# Patient Record
Sex: FEMALE | Race: WHITE | NOT HISPANIC OR LATINO | Employment: FULL TIME | URBAN - NONMETROPOLITAN AREA
[De-identification: names, ages, dates, MRNs, and addresses within clinical notes are randomized per-mention and may not be internally consistent; named-entity substitution may affect disease eponyms.]

---

## 2017-10-16 ENCOUNTER — OFFICE VISIT (OUTPATIENT)
Dept: SURGERY | Facility: CLINIC | Age: 37
End: 2017-10-16

## 2017-10-16 VITALS
SYSTOLIC BLOOD PRESSURE: 122 MMHG | BODY MASS INDEX: 24.1 KG/M2 | RESPIRATION RATE: 16 BRPM | HEART RATE: 82 BPM | DIASTOLIC BLOOD PRESSURE: 80 MMHG | WEIGHT: 136 LBS | OXYGEN SATURATION: 100 % | HEIGHT: 63 IN | TEMPERATURE: 98.2 F

## 2017-10-16 DIAGNOSIS — R59.9 ENLARGED LYMPH NODE: Primary | ICD-10-CM

## 2017-10-16 DIAGNOSIS — K40.90 NON-RECURRENT UNILATERAL INGUINAL HERNIA WITHOUT OBSTRUCTION OR GANGRENE: ICD-10-CM

## 2017-10-16 PROCEDURE — 99204 OFFICE O/P NEW MOD 45 MIN: CPT | Performed by: SURGERY

## 2017-10-16 RX ORDER — ALBUTEROL SULFATE 90 UG/1
2 AEROSOL, METERED RESPIRATORY (INHALATION) EVERY 4 HOURS PRN
COMMUNITY

## 2017-10-16 RX ORDER — FLUTICASONE PROPIONATE 220 UG/1
1 AEROSOL, METERED RESPIRATORY (INHALATION)
COMMUNITY
End: 2017-11-13

## 2017-10-16 RX ORDER — CLONAZEPAM 1 MG/1
1 TABLET ORAL NIGHTLY
COMMUNITY

## 2017-10-16 RX ORDER — GABAPENTIN 300 MG/1
300 CAPSULE ORAL NIGHTLY
COMMUNITY
End: 2021-01-08

## 2017-10-16 RX ORDER — TRAMADOL HYDROCHLORIDE 50 MG/1
50 TABLET ORAL EVERY 6 HOURS PRN
COMMUNITY

## 2017-10-16 RX ORDER — TIZANIDINE 4 MG/1
8 TABLET ORAL NIGHTLY PRN
COMMUNITY
End: 2021-01-08

## 2017-10-16 RX ORDER — IBUPROFEN 600 MG/1
600 TABLET ORAL EVERY 6 HOURS PRN
COMMUNITY
End: 2021-01-08 | Stop reason: ALTCHOICE

## 2017-10-16 RX ORDER — THEOPHYLLINE 600 MG/1
600 TABLET, EXTENDED RELEASE ORAL DAILY
COMMUNITY

## 2017-10-16 NOTE — PROGRESS NOTES
"Patient: Marlin Shi    YOB: 1980    Date: 10/16/2017    Primary Care Provider: Kris Melton MD    Chief complaint:   Chief Complaint   Patient presents with   • Swollen Glands     right submandibular and right groin area.       Subjective .     History of present illness: Pt is here for evaluation of palpable \"lymph node\" in right groin area which has been present for @ 2 years, she stated \"they area always swollen but sometimes it gets as big as a golf ball,\" she also complains of palpable \"lymph node\" @ her right submandibular area which has been present for many years. Pt denies redness and/or warmth at either site.Pain increases with lifting and straining.  No change in bowel habits.    Review of Systems   Constitutional: Negative for chills, fever and unexpected weight change.   HENT: Negative for hearing loss, trouble swallowing and voice change.    Eyes: Negative for visual disturbance.   Respiratory: Negative for apnea, cough, chest tightness, shortness of breath and wheezing.    Cardiovascular: Negative for chest pain, palpitations and leg swelling.   Gastrointestinal: Negative for abdominal distention, abdominal pain, anal bleeding, blood in stool, constipation, diarrhea, nausea, rectal pain and vomiting.   Endocrine: Negative for cold intolerance and heat intolerance.   Genitourinary: Negative for difficulty urinating, dysuria and flank pain.   Musculoskeletal: Negative for back pain and gait problem.   Skin: Negative for color change, rash and wound.   Neurological: Negative for dizziness, syncope, speech difficulty, weakness, light-headedness, numbness and headaches.   Hematological: Negative for adenopathy. Does not bruise/bleed easily.   Psychiatric/Behavioral: Negative for confusion. The patient is not nervous/anxious.        Allergies:  Allergies   Allergen Reactions   • Sulfa Antibiotics        Medications:    Current Outpatient Prescriptions:   •  albuterol " "(PROVENTIL HFA;VENTOLIN HFA) 108 (90 Base) MCG/ACT inhaler, Inhale 2 puffs Every 4 (Four) Hours As Needed for Wheezing., Disp: , Rfl:   •  clonazePAM (KLONOPIN) 1 MG tablet, Take 1 mg by mouth 2 (Two) Times a Day As Needed for Seizures., Disp: , Rfl:   •  fluticasone (FLOVENT HFA) 220 MCG/ACT inhaler, Inhale 1 puff 2 (Two) Times a Day., Disp: , Rfl:   •  gabapentin (NEURONTIN) 300 MG capsule, Take 300 mg by mouth 3 (Three) Times a Day., Disp: , Rfl:   •  ibuprofen (ADVIL,MOTRIN) 600 MG tablet, Take 600 mg by mouth Every 6 (Six) Hours As Needed for Mild Pain ., Disp: , Rfl:   •  theophylline (UNIPHYL) 600 MG 24 hr tablet, Take 600 mg by mouth Daily., Disp: , Rfl:   •  tiZANidine (ZANAFLEX) 4 MG tablet, Take 4 mg by mouth At Night As Needed for Muscle Spasms., Disp: , Rfl:   •  traMADol (ULTRAM) 50 MG tablet, Take 50 mg by mouth Every 6 (Six) Hours As Needed for Moderate Pain ., Disp: , Rfl:     History\"  Past Medical History:   Diagnosis Date   • Fibromyalgia    • MRSA (methicillin resistant Staphylococcus aureus)    • Neck pain        Past Surgical History:   Procedure Laterality Date   • BREAST AUGMENTATION     • DILATION AND CURETTAGE, DIAGNOSTIC / THERAPEUTIC     • SINUS SURGERY         Family History   Problem Relation Age of Onset   • Hypertension Father    • Cancer Maternal Aunt    • Diabetes Paternal Uncle        Social History   Substance Use Topics   • Smoking status: Never Smoker   • Smokeless tobacco: None   • Alcohol use Yes        Objective     Vital Signs:   /80  Pulse 82  Temp 98.2 °F (36.8 °C) (Temporal Artery )   Resp 16  Ht 63\" (160 cm)  Wt 136 lb (61.7 kg)  SpO2 100%  BMI 24.09 kg/m2    Physical Exam:   General Appearance:    Alert, cooperative, in no acute distress   Head:    Normocephalic, without obvious abnormality, atraumatic   Eyes:            Lids and lashes normal, conjunctivae and sclerae normal, no   icterus, no pallor, corneas clear, PERRLA   Ears:    Ears appear intact " with no abnormalities noted   Throat:   No oral lesions, no thrush, oral mucosa moist   Neck:   No adenopathy, supple, trachea midline, no thyromegaly, no   carotid bruit, no JVD   Lungs:     Clear to auscultation,respirations regular, even and                  unlabored    Heart:    Regular rhythm and normal rate, normal S1 and S2, no            murmur, no gallop, no rub, no click   Chest Wall:    No abnormalities observed   Abdomen:     Normal bowel sounds, no masses, no organomegaly, soft        non-tender, non-distended, no guarding, no rebound                tenderness   Extremities:   Moves all extremities well, no edema, no cyanosis, no             redness   Pulses:   Pulses palpable and equal bilaterally   Skin:   No bleeding, bruising or rash   Lymph nodes:   No palpable adenopathy   Neurologic:   Cranial nerves 2 - 12 grossly intact, sensation intact, DTR       present and equal bilaterally     Results Review:   I reviewed the patient's new clinical results.    Assessment/Plan     1. Enlarged lymph node    2. Non-recurrent unilateral inguinal hernia without obstruction or gangrene        Patient reassured about a large lymph node, appears reactive.  We will schedule for repair of right femoral hernia.  Risk of bleeding, use of mesh and recurrence discussed and patient agreeable.    I discussed the patients findings and my recommendations with patient    Review of Systems was reviewed and confirmed as accurate today.    Electronically signed by Jennifer Torrez MD  10/16/17    Scribed for Jennifer Torrez MD by Melissa Lucero. 10/16/2017  4:31 PM

## 2017-10-18 ENCOUNTER — PREP FOR SURGERY (OUTPATIENT)
Dept: OTHER | Facility: HOSPITAL | Age: 37
End: 2017-10-18

## 2017-10-18 DIAGNOSIS — K40.90 NON-RECURRENT UNILATERAL INGUINAL HERNIA WITHOUT OBSTRUCTION OR GANGRENE: Primary | ICD-10-CM

## 2017-11-13 ENCOUNTER — HOSPITAL ENCOUNTER (OUTPATIENT)
Dept: GENERAL RADIOLOGY | Facility: HOSPITAL | Age: 37
Discharge: HOME | End: 2017-11-13
Admitting: SURGERY

## 2017-11-13 ENCOUNTER — APPOINTMENT (OUTPATIENT)
Dept: PREADMISSION TESTING | Facility: HOSPITAL | Age: 37
End: 2017-11-13

## 2017-11-13 VITALS
OXYGEN SATURATION: 99 % | HEART RATE: 79 BPM | HEIGHT: 63 IN | SYSTOLIC BLOOD PRESSURE: 121 MMHG | BODY MASS INDEX: 23.92 KG/M2 | DIASTOLIC BLOOD PRESSURE: 60 MMHG | WEIGHT: 135 LBS

## 2017-11-13 DIAGNOSIS — K40.90 NON-RECURRENT UNILATERAL INGUINAL HERNIA WITHOUT OBSTRUCTION OR GANGRENE: ICD-10-CM

## 2017-11-13 LAB
ANION GAP SERPL CALCULATED.3IONS-SCNC: 14.5 MMOL/L
BACTERIA UR QL AUTO: ABNORMAL /HPF
BILIRUB UR QL STRIP: NEGATIVE
BUN BLD-MCNC: 13 MG/DL (ref 7–20)
BUN/CREAT SERPL: 16.3 (ref 7.1–23.5)
CALCIUM SPEC-SCNC: 9.3 MG/DL (ref 8.4–10.2)
CHLORIDE SERPL-SCNC: 104 MMOL/L (ref 98–107)
CLARITY UR: CLEAR
CO2 SERPL-SCNC: 27 MMOL/L (ref 26–30)
COLOR UR: YELLOW
CREAT BLD-MCNC: 0.8 MG/DL (ref 0.6–1.3)
DEPRECATED RDW RBC AUTO: 37.8 FL (ref 37–54)
ERYTHROCYTE [DISTWIDTH] IN BLOOD BY AUTOMATED COUNT: 12.6 % (ref 11.5–14.5)
GFR SERPL CREATININE-BSD FRML MDRD: 81 ML/MIN/1.73
GLUCOSE BLD-MCNC: 104 MG/DL (ref 74–98)
GLUCOSE UR STRIP-MCNC: NEGATIVE MG/DL
HCT VFR BLD AUTO: 38.4 % (ref 37–47)
HGB BLD-MCNC: 13.5 G/DL (ref 12–16)
HGB UR QL STRIP.AUTO: NEGATIVE
HYALINE CASTS UR QL AUTO: ABNORMAL /LPF
KETONES UR QL STRIP: NEGATIVE
LEUKOCYTE ESTERASE UR QL STRIP.AUTO: ABNORMAL
MCH RBC QN AUTO: 29.4 PG (ref 27–31)
MCHC RBC AUTO-ENTMCNC: 35.2 G/DL (ref 30–37)
MCV RBC AUTO: 83.7 FL (ref 81–99)
NITRITE UR QL STRIP: NEGATIVE
PH UR STRIP.AUTO: 7 [PH] (ref 5–8)
PLATELET # BLD AUTO: 284 10*3/MM3 (ref 130–400)
PMV BLD AUTO: 9.5 FL (ref 6–12)
POTASSIUM BLD-SCNC: 3.5 MMOL/L (ref 3.5–5.1)
PROT UR QL STRIP: NEGATIVE
RBC # BLD AUTO: 4.59 10*6/MM3 (ref 4.2–5.4)
RBC # UR: ABNORMAL /HPF
REF LAB TEST METHOD: ABNORMAL
SODIUM BLD-SCNC: 142 MMOL/L (ref 137–145)
SP GR UR STRIP: 1.01 (ref 1–1.03)
SQUAMOUS #/AREA URNS HPF: ABNORMAL /HPF
UROBILINOGEN UR QL STRIP: ABNORMAL
WBC NRBC COR # BLD: 8.83 10*3/MM3 (ref 4.8–10.8)
WBC UR QL AUTO: ABNORMAL /HPF

## 2017-11-13 PROCEDURE — 85027 COMPLETE CBC AUTOMATED: CPT | Performed by: SURGERY

## 2017-11-13 PROCEDURE — 87081 CULTURE SCREEN ONLY: CPT | Performed by: SURGERY

## 2017-11-13 PROCEDURE — 36415 COLL VENOUS BLD VENIPUNCTURE: CPT

## 2017-11-13 PROCEDURE — 81001 URINALYSIS AUTO W/SCOPE: CPT | Performed by: SURGERY

## 2017-11-13 PROCEDURE — 93005 ELECTROCARDIOGRAM TRACING: CPT | Performed by: SURGERY

## 2017-11-13 PROCEDURE — 71010 HC CHEST PA OR AP: CPT

## 2017-11-13 PROCEDURE — 80048 BASIC METABOLIC PNL TOTAL CA: CPT | Performed by: SURGERY

## 2017-11-13 PROCEDURE — 87086 URINE CULTURE/COLONY COUNT: CPT | Performed by: SURGERY

## 2017-11-13 RX ORDER — FLUTICASONE PROPIONATE 220 UG/1
1 AEROSOL, METERED RESPIRATORY (INHALATION) NIGHTLY
COMMUNITY
End: 2021-07-16

## 2017-11-15 LAB — BACTERIA SPEC AEROBE CULT: NO GROWTH

## 2017-11-17 LAB — MRSA SPEC QL CULT: NORMAL

## 2017-11-20 ENCOUNTER — ANESTHESIA (OUTPATIENT)
Dept: PERIOP | Facility: HOSPITAL | Age: 37
End: 2017-11-20

## 2017-11-20 ENCOUNTER — HOSPITAL ENCOUNTER (OUTPATIENT)
Facility: HOSPITAL | Age: 37
Setting detail: HOSPITAL OUTPATIENT SURGERY
Discharge: HOME | End: 2017-11-20
Attending: SURGERY | Admitting: SURGERY

## 2017-11-20 ENCOUNTER — ANESTHESIA EVENT (OUTPATIENT)
Dept: PERIOP | Facility: HOSPITAL | Age: 37
End: 2017-11-20

## 2017-11-20 VITALS
TEMPERATURE: 100.5 F | DIASTOLIC BLOOD PRESSURE: 72 MMHG | RESPIRATION RATE: 18 BRPM | OXYGEN SATURATION: 98 % | SYSTOLIC BLOOD PRESSURE: 128 MMHG | HEART RATE: 86 BPM

## 2017-11-20 DIAGNOSIS — K40.90 NON-RECURRENT UNILATERAL INGUINAL HERNIA WITHOUT OBSTRUCTION OR GANGRENE: ICD-10-CM

## 2017-11-20 LAB — B-HCG UR QL: NEGATIVE

## 2017-11-20 PROCEDURE — 49553 RPR FEM HERNIA INIT BLOCKED: CPT | Performed by: SURGERY

## 2017-11-20 PROCEDURE — 25010000002 HYDROMORPHONE PER 4 MG

## 2017-11-20 PROCEDURE — 25010000002 HYDROMORPHONE PER 4 MG: Performed by: NURSE ANESTHETIST, CERTIFIED REGISTERED

## 2017-11-20 PROCEDURE — 25010000002 DEXAMETHASONE PER 1 MG: Performed by: NURSE ANESTHETIST, CERTIFIED REGISTERED

## 2017-11-20 PROCEDURE — 25010000002 PROMETHAZINE PER 50 MG

## 2017-11-20 PROCEDURE — 25010000002 PROPOFOL 200 MG/20ML EMULSION: Performed by: NURSE ANESTHETIST, CERTIFIED REGISTERED

## 2017-11-20 PROCEDURE — 81025 URINE PREGNANCY TEST: CPT | Performed by: SURGERY

## 2017-11-20 PROCEDURE — 51860 REPAIR OF BLADDER WOUND: CPT | Performed by: SURGERY

## 2017-11-20 PROCEDURE — C1781 MESH (IMPLANTABLE): HCPCS | Performed by: SURGERY

## 2017-11-20 PROCEDURE — 25010000002 ONDANSETRON PER 1 MG: Performed by: NURSE ANESTHETIST, CERTIFIED REGISTERED

## 2017-11-20 PROCEDURE — 25010000002 LORAZEPAM PER 2 MG

## 2017-11-20 PROCEDURE — S0260 H&P FOR SURGERY: HCPCS | Performed by: SURGERY

## 2017-11-20 PROCEDURE — 25010000003 CEFAZOLIN PER 500 MG: Performed by: SURGERY

## 2017-11-20 DEVICE — IMPLANTABLE DEVICE: Type: IMPLANTABLE DEVICE | Site: GROIN | Status: FUNCTIONAL

## 2017-11-20 RX ORDER — PROPOFOL 10 MG/ML
INJECTION, EMULSION INTRAVENOUS AS NEEDED
Status: DISCONTINUED | OUTPATIENT
Start: 2017-11-20 | End: 2017-11-20 | Stop reason: SURG

## 2017-11-20 RX ORDER — LORAZEPAM 2 MG/ML
0.5 INJECTION INTRAMUSCULAR 2 TIMES DAILY PRN
Status: DISCONTINUED | OUTPATIENT
Start: 2017-11-20 | End: 2017-11-20 | Stop reason: HOSPADM

## 2017-11-20 RX ORDER — PROMETHAZINE HYDROCHLORIDE 25 MG/ML
12.5 INJECTION, SOLUTION INTRAMUSCULAR; INTRAVENOUS ONCE AS NEEDED
Status: COMPLETED | OUTPATIENT
Start: 2017-11-20 | End: 2017-11-20

## 2017-11-20 RX ORDER — SODIUM CHLORIDE, SODIUM LACTATE, POTASSIUM CHLORIDE, CALCIUM CHLORIDE 600; 310; 30; 20 MG/100ML; MG/100ML; MG/100ML; MG/100ML
1000 INJECTION, SOLUTION INTRAVENOUS CONTINUOUS PRN
Status: DISCONTINUED | OUTPATIENT
Start: 2017-11-20 | End: 2017-11-20 | Stop reason: HOSPADM

## 2017-11-20 RX ORDER — ONDANSETRON 2 MG/ML
4 INJECTION INTRAMUSCULAR; INTRAVENOUS ONCE AS NEEDED
Status: DISCONTINUED | OUTPATIENT
Start: 2017-11-20 | End: 2017-11-20 | Stop reason: HOSPADM

## 2017-11-20 RX ORDER — PROMETHAZINE HYDROCHLORIDE 25 MG/ML
INJECTION, SOLUTION INTRAMUSCULAR; INTRAVENOUS
Status: COMPLETED
Start: 2017-11-20 | End: 2017-11-20

## 2017-11-20 RX ORDER — HYDROCODONE BITARTRATE AND ACETAMINOPHEN 5; 325 MG/1; MG/1
1-2 TABLET ORAL EVERY 4 HOURS PRN
Qty: 28 TABLET | Refills: 0 | Status: SHIPPED | OUTPATIENT
Start: 2017-11-20 | End: 2021-01-08

## 2017-11-20 RX ORDER — MAGNESIUM HYDROXIDE 1200 MG/15ML
LIQUID ORAL AS NEEDED
Status: DISCONTINUED | OUTPATIENT
Start: 2017-11-20 | End: 2017-11-20 | Stop reason: HOSPADM

## 2017-11-20 RX ORDER — MEPERIDINE HYDROCHLORIDE 50 MG/ML
50 INJECTION INTRAMUSCULAR; INTRAVENOUS; SUBCUTANEOUS ONCE
Status: COMPLETED | OUTPATIENT
Start: 2017-11-20 | End: 2017-11-20

## 2017-11-20 RX ORDER — BUPIVACAINE HYDROCHLORIDE 5 MG/ML
INJECTION, SOLUTION EPIDURAL; INTRACAUDAL AS NEEDED
Status: DISCONTINUED | OUTPATIENT
Start: 2017-11-20 | End: 2017-11-20 | Stop reason: HOSPADM

## 2017-11-20 RX ORDER — SODIUM CHLORIDE 0.9 % (FLUSH) 0.9 %
3 SYRINGE (ML) INJECTION AS NEEDED
Status: DISCONTINUED | OUTPATIENT
Start: 2017-11-20 | End: 2017-11-20 | Stop reason: HOSPADM

## 2017-11-20 RX ORDER — LORAZEPAM 2 MG/ML
INJECTION INTRAMUSCULAR
Status: COMPLETED
Start: 2017-11-20 | End: 2017-11-20

## 2017-11-20 RX ORDER — DEXAMETHASONE SODIUM PHOSPHATE 4 MG/ML
INJECTION, SOLUTION INTRA-ARTICULAR; INTRALESIONAL; INTRAMUSCULAR; INTRAVENOUS; SOFT TISSUE AS NEEDED
Status: DISCONTINUED | OUTPATIENT
Start: 2017-11-20 | End: 2017-11-20 | Stop reason: SURG

## 2017-11-20 RX ORDER — ONDANSETRON 2 MG/ML
INJECTION INTRAMUSCULAR; INTRAVENOUS AS NEEDED
Status: DISCONTINUED | OUTPATIENT
Start: 2017-11-20 | End: 2017-11-20 | Stop reason: SURG

## 2017-11-20 RX ORDER — MEPERIDINE HYDROCHLORIDE 50 MG/ML
INJECTION INTRAMUSCULAR; INTRAVENOUS; SUBCUTANEOUS
Status: COMPLETED
Start: 2017-11-20 | End: 2017-11-20

## 2017-11-20 RX ORDER — SODIUM CHLORIDE 9 MG/ML
INJECTION, SOLUTION INTRAVENOUS
Status: DISCONTINUED
Start: 2017-11-20 | End: 2017-11-20 | Stop reason: HOSPADM

## 2017-11-20 RX ORDER — MORPHINE SULFATE 2 MG/ML
2 INJECTION, SOLUTION INTRAMUSCULAR; INTRAVENOUS
Status: DISCONTINUED | OUTPATIENT
Start: 2017-11-20 | End: 2017-11-20 | Stop reason: HOSPADM

## 2017-11-20 RX ADMIN — HYDROMORPHONE HYDROCHLORIDE 0.5 MG: 1 INJECTION, SOLUTION INTRAMUSCULAR; INTRAVENOUS; SUBCUTANEOUS at 11:01

## 2017-11-20 RX ADMIN — ONDANSETRON 4 MG: 2 INJECTION INTRAMUSCULAR; INTRAVENOUS at 09:06

## 2017-11-20 RX ADMIN — HYDROMORPHONE HYDROCHLORIDE 0.5 MG: 1 INJECTION, SOLUTION INTRAMUSCULAR; INTRAVENOUS; SUBCUTANEOUS at 10:49

## 2017-11-20 RX ADMIN — CEFAZOLIN SODIUM 2 G: 2 SOLUTION INTRAVENOUS at 09:04

## 2017-11-20 RX ADMIN — LORAZEPAM 0.5 MG: 2 INJECTION INTRAMUSCULAR at 10:20

## 2017-11-20 RX ADMIN — MEPERIDINE HYDROCHLORIDE 50 MG: 50 INJECTION, SOLUTION INTRAMUSCULAR; INTRAVENOUS; SUBCUTANEOUS at 10:34

## 2017-11-20 RX ADMIN — SODIUM CHLORIDE, POTASSIUM CHLORIDE, SODIUM LACTATE AND CALCIUM CHLORIDE: 600; 310; 30; 20 INJECTION, SOLUTION INTRAVENOUS at 09:35

## 2017-11-20 RX ADMIN — HYDROMORPHONE HYDROCHLORIDE 0.5 MG: 1 INJECTION, SOLUTION INTRAMUSCULAR; INTRAVENOUS; SUBCUTANEOUS at 10:25

## 2017-11-20 RX ADMIN — HYDROMORPHONE HYDROCHLORIDE 0.5 MG: 1 INJECTION, SOLUTION INTRAMUSCULAR; INTRAVENOUS; SUBCUTANEOUS at 10:13

## 2017-11-20 RX ADMIN — DEXAMETHASONE SODIUM PHOSPHATE 8 MG: 4 INJECTION, SOLUTION INTRAMUSCULAR; INTRAVENOUS at 09:06

## 2017-11-20 RX ADMIN — PROPOFOL 200 MG: 10 INJECTION, EMULSION INTRAVENOUS at 09:06

## 2017-11-20 RX ADMIN — PROMETHAZINE HYDROCHLORIDE 12.5 MG: 25 INJECTION, SOLUTION INTRAMUSCULAR; INTRAVENOUS at 11:16

## 2017-11-20 RX ADMIN — SODIUM CHLORIDE, POTASSIUM CHLORIDE, SODIUM LACTATE AND CALCIUM CHLORIDE 1000 ML: 600; 310; 30; 20 INJECTION, SOLUTION INTRAVENOUS at 08:01

## 2017-11-20 RX ADMIN — LORAZEPAM 0.5 MG: 2 INJECTION, SOLUTION INTRAMUSCULAR; INTRAVENOUS at 10:20

## 2017-11-20 RX ADMIN — MEPERIDINE HYDROCHLORIDE 50 MG: 50 INJECTION INTRAMUSCULAR; INTRAVENOUS; SUBCUTANEOUS at 10:34

## 2017-11-20 NOTE — OP NOTE
PATIENT:    Marlin Pena    DATE OF SURGERY:  11/20/2017    PHYSICIAN:    Jennifer Torrez MD    REFERRING PHYSICIAN:  Jennifer Torrez MD    YOB: 1980    PREOPERATIVE DIAGNOSIS:  Right femoral hernia    POSTOPERATIVE DIAGNOSIS:  Incarcerated right femoral hernia with involvement of neck of the bladder.  A 1 cm bladder injury.    PROCEDURE:  #1 incarcerated right femoral hernia repair with mesh.  #2 primary repair of bladder injury with suture.    INDICATIONS:  The patient was sent to me as a consultation by Jennifer Torrez MD for evaluation and treatment of a history significant for right femoral hernia. They are here now today for repair with mesh.  Risk of bleeding, infection use of mesh with recurrence discussed and patient agreeable    ANESTHESIA:  General Anesthesia     OPERATIVE PROCEDURE:  The patient was taken to the operating room, placed in the supine position, and given general endotracheal anesthesia.  They were prepped and draped in the normal sterile fashion.  They did receive preoperative IV antibiotics.  The nursing staff did perform intraoperative timeout prior to the incision.    Incision right groin made down to the fascia.  The external wife was divided, preparedwith entered.  Patient had the bladder involvement of femoral hernia.  It appeared that a portion of bladder had gone down into the femoral canal that was incarcerated.  There was a very difficult separation and reduction of the hernia.  In doing so a 1 cm tear in the bladder was identified with leakage of urine.  After reduction of the hernia, I divided the round ligament.  I then repaired the bladder primarily using 2 layers of 3-0 Vicryl and 2-0 Vicryl suture.  I then buttressed the repair with muscle tissue.  A Powell catheter was inserted and large amounts of fluid was placed in the bladder with no further leakage.  I then placed a 1 x 4 cm piece of mesh case closed the femoral defect with Prolene suture and  finish repair the hernia with a running Prolene suture as well the external bike was closed with 2-0 Vicryl suture the abdominal wall closed in 2 layers.  The Powell catheter was left in place.  I will remove in the office in 2 days.    The patient was stable at this point in time and subsequently transferred back to the recovery room in stable condition.       Jennifer Torrez MD  11/20/2017  10:14 AM

## 2017-11-20 NOTE — DISCHARGE INSTRUCTIONS
Please follow all post op instructions and follow up appointment time from your physician's office included in your discharge packet.  .   To assist you in voiding:  Drink plenty of fluids  Listen to running water while attempting to void.    If you are unable to urinate and you have an uncomfortable urge to void or it has been   6 hours since you were discharged, return to the Emergency Room  No pushing, pulling, tugging,  heavy lifting, or strenuous activity.  No major decision making, driving, or drinking alcoholic beverages for 24 hours. ( due to the medications you have  received)  Always use good hand hygiene/washing techniques.  NO driving while taking pain medications.  Empty leg bag often and record amount

## 2017-11-20 NOTE — ANESTHESIA PROCEDURE NOTES
Airway  Urgency: elective    Airway not difficult    General Information and Staff    Patient location during procedure: OR  CRNA: FABIAN FRANKLIN    Indications and Patient Condition  Indications for airway management: airway protection    Preoxygenated: yes  Mask difficulty assessment: 0 - not attempted    Final Airway Details  Final airway type: supraglottic airway      Successful airway: classic  Size 3    Number of attempts at approach: 1    Additional Comments  Airway pressure leak at <20cm/h20

## 2017-11-20 NOTE — PLAN OF CARE
Problem: Perioperative Period (Adult)  Intervention: Promote Pulmonary Hygiene and Secretion Clearance    11/20/17 1010   Promote Aggressive Pulmonary Hygiene/Secretion Management   Cough And Deep Breathing done with encouragement   Positioning   Head Of Bed (HOB) Position HOB elevated   Activity   Activity Type activity adjusted per tolerance;dorsiflexion, plantar flexion encouraged       Intervention: Monitor/Manage Pain    11/20/17 1010   Safety Interventions   Medication Review/Management medications reviewed   Manage Acute Burn Pain   Bowel Intervention adequate fluid intake promoted   Pain Management Interventions diversional activity encouraged;cold applied;pain care plan reviewed with patient/caregiver;medicated       Intervention: Promote Normothermia    11/20/17 1010   Cardiac Interventions   Warming Thermoregulation Maintenance warm blankets applied         Goal: Signs and Symptoms of Listed Potential Problems Will be Absent or Manageable (Perioperative Period)  Outcome: Ongoing (interventions implemented as appropriate)    11/20/17 1010   Perioperative Period   Problems Assessed (Perioperative Period) all   Problems Present (Perioperative Period) pain

## 2017-11-20 NOTE — H&P
"    [facility]   HISTORY AND PHYSICAL      Name:  Marlin Pena   Age:  37 y.o.  Sex:  female  :  1980  MRN:  [id number]   Visit Number:  [id number]  Admission Date:  2017  Date Of Service:  17  Primary Care Physician:  Kris Melton MD    Chief Complaint:     Right femoral hernia    History Of Presenting Illness:      Patient presents with right femoral hernia, increased pain and discomfort.  Also enlarged lymph node that was benign.  He comes in for repair of hernia.    Review Of Systems:     General ROS: Patient denies any fevers, chills or loss of consciousness.  No complaints of generalized weakness  Psychological ROS: Denies any hallucinations and delusions.  Ophthalmic ROS: no transient loss of vision.  ENT ROS: Denies sore throat, nasal congestion or ear pain.   Allergy and Immunology ROS: Denies rash or itching.  Hematological and Lymphatic ROS: Denies neck swelling or easy bleeding.  Endocrine ROS: Denies any recent unintentional weight gain or loss.  Breast ROS: Denies any pain or swelling.  Respiratory ROS: Denies cough or shortness of breath.   Cardiovascular ROS: Denies chest pain or palpitations. No history of exertional chest pain.   Gastrointestinal ROS: Denies nausea and vomiting. Denies any abdominal pain. No diarrhea.   Genito-Urinary ROS: Denies dysuria or hematuria.  Musculoskeletal ROS: no back pain. No muscle pain. No calf pain.   Neurological ROS: Denies any focal weakness. No loss of consciousness. Denies any numbness.   Dermatological ROS: Denies any redness or pruritis.     Past Medical History:    Past Medical History:   Diagnosis Date   • Arthritis    • Asthma    • Depression    • Dysphagia     REPORTS \"IT'S LIKE THINGS WON'T GO DOWN SOMETIMES\"   • Fibromyalgia    • GERD (gastroesophageal reflux disease)     PATIENT REPORTS HAD ER VISIT IN 2016 FOR RIGHT SIDED PAIN.  PATIENT THOUGHT MAY BE HEART RELATED BUT REPORTS " WAS TOLD ONLY REFLUX.   • History of bronchitis    • History of kidney stones     REPORTS PASSED WITHOUT SURGICAL INTERVENTION IN SUMMER OF 2017   • History of panic attacks    • History of pneumonia    • Insomnia    • MRSA (methicillin resistant Staphylococcus aureus) 2008    REPORTS IN SINUS CAVITY AND THAT SHE WAS TREATED   • Neck pain    • TIA (transient ischemic attack)     REPORTS HX OF TIA 8 YEARS AGO AND THAT SHE WAS TAKEN OFF OF BCP.  NO RESIDUAL NOTED   • Wears glasses     FOR READING       Past Surgical history:    Past Surgical History:   Procedure Laterality Date   • BREAST AUGMENTATION     • COLONOSCOPY     • DILATION AND CURETTAGE, DIAGNOSTIC / THERAPEUTIC     • ENDOSCOPY     • SINUS SURGERY     • WISDOM TOOTH EXTRACTION         Social History:    Social History     Social History   • Marital status:      Spouse name: N/A   • Number of children: N/A   • Years of education: N/A     Occupational History   • Not on file.     Social History Main Topics   • Smoking status: Never Smoker   • Smokeless tobacco: Never Used   • Alcohol use Yes      Comment: SOCIAL USE ONLY, NO HX OF ABUSE REPORTED   • Drug use: No   • Sexual activity: Defer     Other Topics Concern   • Not on file     Social History Narrative       Family History:    Family History   Problem Relation Age of Onset   • Hypertension Father    • Cancer Maternal Aunt    • Diabetes Paternal Uncle        Allergies:      Sulfa antibiotics    Home Medications:    Prior to Admission Medications     Prescriptions Last Dose Informant Patient Reported? Taking?    albuterol (PROVENTIL HFA;VENTOLIN HFA) 108 (90 Base) MCG/ACT inhaler Past Week Self Yes Yes    Inhale 2 puffs Every 4 (Four) Hours As Needed for Wheezing.    albuterol (PROVENTIL) (5 MG/ML) 0.5% nebulizer solution Past Week Self Yes Yes    Take 2.5 mg by nebulization Every 6 (Six) Hours As Needed for Wheezing.    clonazePAM (KLONOPIN) 1 MG tablet 11/19/2017 Self Yes Yes    Take 1 mg by  mouth At Night As Needed for Anxiety or Seizures. REPORTS SHE TAKES 2 OF THE 0.5MG TABLETS (FOR TOTAL DOSE OF 1MG) AT NIGHT    fluticasone (FLOVENT HFA) 220 MCG/ACT inhaler 11/19/2017 Self Yes Yes    Inhale 1 puff Every Night.    gabapentin (NEURONTIN) 300 MG capsule 11/19/2017 Self Yes Yes    Take 300 mg by mouth Every Night.    ibuprofen (ADVIL,MOTRIN) 600 MG tablet Past Month Self Yes Yes    Take 600 mg by mouth Every 6 (Six) Hours As Needed for Mild Pain  or Headache.    Loratadine-Pseudoephedrine (CLARITIN-D 24 HOUR PO) 11/19/2017 Self Yes Yes    Take 1 tablet by mouth Daily.    melatonin 5 MG sublingual tablet sublingual tablet Past Week Self Yes Yes    Place 10 mg under the tongue Every Night.    Probiotic Product (PROBIOTIC ADVANCED PO) 11/19/2017 Self Yes Yes    Take 1 tablet by mouth Daily.    theophylline (UNIPHYL) 600 MG 24 hr tablet 11/19/2017 Self Yes Yes    Take 600 mg by mouth Daily.    tiZANidine (ZANAFLEX) 4 MG tablet 11/19/2017 Self Yes Yes    Take 8 mg by mouth At Night As Needed for Muscle Spasms.    traMADol (ULTRAM) 50 MG tablet 11/19/2017 Self Yes Yes    Take 50 mg by mouth Every 6 (Six) Hours As Needed for Moderate Pain .             ED Medications:    Medications   sodium chloride 0.9 % flush 3 mL (not administered)   lactated ringers infusion 1,000 mL (1,000 mL Intravenous New Bag 11/20/17 0801)   ceFAZolin (ANCEF) IVPB (duplex) 2 g (not administered)   ceFAZolin (ANCEF) 2-3 GM-% IVPB (duplex)  - ADS Override Pull (not administered)       Vital Signs:    Temp:  [98.4 °F (36.9 °C)] 98.4 °F (36.9 °C)  Heart Rate:  [89] 89  Resp:  [16] 16  BP: (140)/(77) 140/77    There were no vitals filed for this visit.    There is no height or weight on file to calculate BMI.    Physical Exam:      General Appearance:  Alert and cooperative, not in any acute distress.   Head:  Atraumatic and normocephalic, without obvious abnormality.   Eyes:          PERRLA, conjunctivae and sclerae normal, no Icterus.  No pallor. Extra-occular movements are within normal limits.   Ears:  Ears appear intact with no abnormalities noted.   Throat: No oral lesions, no thrush, oral mucosa moist.   Neck: Supple, trachea midline, no thyromegaly, no carotid bruit.       Respiratory/Lungs:   Breath sounds heard bilaterally equally.  No crackles or wheezing. No Pleural rub or bronchial breathing. Normal respiratory effort.    Cardiovascular/Heart:  Normal S1 and S2, no murmur. No edema   GI/Abdomen:   No masses, no hepatosplenomegaly. Soft, non-tender, non-distended, Right inguinal hernia, reducible              Musculoskeletal/ Extremities:   Moves all extremities well   Pulses: Pulses palpable and equal bilaterally   Skin: No bleeding, bruising or rash, no induration   Lymph nodes: No palpable adenopathy   Psychiatric : Alert and oriented ×3.  No depression or anxiety            EKG:      Negative    Labs:    Lab Results (last 24 hours)     Procedure Component Value Units Date/Time    Pregnancy, Urine - Urine, Clean Catch [[id number]]  (Normal) Collected:  11/20/17 0748    Specimen:  Urine from Urine, Clean Catch Updated:  11/20/17 0814     HCG, Urine QL Negative          Radiology:    Imaging Results (last 72 hours)     ** No results found for the last 72 hours. **          Assessment:    Right femoral hernia, symptomatic.     Plan:     Plans for repair today.  Risks of bleeding, infection and recurrence and use of mesh discussed and patient agreeable    Jennifer Torrez MD  11/20/17  8:43 AM

## 2017-11-20 NOTE — PLAN OF CARE
Problem: Patient Care Overview (Adult)  Goal: Plan of Care Review  Outcome: Ongoing (interventions implemented as appropriate)    11/20/17 1200   Coping/Psychosocial Response Interventions   Plan Of Care Reviewed With patient   Patient Care Overview   Progress progress toward functional goals as expected   Outcome Evaluation   Outcome Summary/Follow up Plan VSS, PACU CARE COMPLETE, TO SDS IN STABLE CONDITION

## 2017-11-20 NOTE — PLAN OF CARE
Problem: Perioperative Period (Adult)  Goal: Signs and Symptoms of Listed Potential Problems Will be Absent or Manageable (Perioperative Period)  Outcome: Ongoing (interventions implemented as appropriate)    11/20/17 0742   Perioperative Period   Problems Assessed (Perioperative Period) all   Problems Present (Perioperative Period) none

## 2017-11-20 NOTE — ANESTHESIA POSTPROCEDURE EVALUATION
Patient: Marlin Pena    Procedure Summary     Date Anesthesia Start Anesthesia Stop Room / Location    11/20/17 0902 1012  NEW OR 4 /  NEW OR       Procedure Diagnosis Surgeon Provider    REPAIR OF INCARCERATED RIGHT FEMORAL HERNIA, PRIMARY REPAIR OF BLADDER (Right Abdomen) Non-recurrent unilateral inguinal hernia without obstruction or gangrene  (Non-recurrent unilateral inguinal hernia without obstruction or gangrene [K40.90]) MD Panfilo Gibbons CRNA          Anesthesia Type: general  Last vitals  BP   129/69 (11/20/17 1200)   Temp   99 °F (37.2 °C) (11/20/17 1200)   Pulse   98 (11/20/17 1200)   Resp   14 (11/20/17 1200)     SpO2   98 % (11/20/17 1200)     Post Anesthesia Care and Evaluation    Patient location during evaluation: PACU  Patient participation: complete - patient participated  Level of consciousness: awake  Pain score: 3  Pain management: adequate  Airway patency: patent  Anesthetic complications: No anesthetic complications  PONV Status: controlled  Cardiovascular status: acceptable and stable  Respiratory status: acceptable and face mask  Hydration status: acceptable

## 2017-11-20 NOTE — ANESTHESIA PREPROCEDURE EVALUATION
Anesthesia Evaluation     Patient summary reviewed and Nursing notes reviewed   no history of anesthetic complications:  NPO Solid Status: > 8 hours  NPO Liquid Status: > 8 hours     Airway   Mallampati: I  TM distance: >3 FB  Neck ROM: full  no difficulty expected  Dental - normal exam     Pulmonary - normal exam   (+) asthma,   Cardiovascular - negative cardio ROS and normal exam        Neuro/Psych  (+) TIA,    GI/Hepatic/Renal/Endo    (+)  GERD,     Musculoskeletal (-) negative ROS    Abdominal    Substance History - negative use     OB/GYN negative ob/gyn ROS         Other - negative ROS                                       Anesthesia Plan    ASA 3     general     intravenous induction   Anesthetic plan and risks discussed with patient.

## 2017-11-21 ENCOUNTER — TELEPHONE (OUTPATIENT)
Dept: SURGERY | Facility: CLINIC | Age: 37
End: 2017-11-21

## 2017-11-21 NOTE — TELEPHONE ENCOUNTER
Patient had called wondering if after leaving the hospital yesterday after her hernia repair did she need an antibiotic.  I told patient not normally, she was going to be seen in the morning so it could be addressed then.

## 2017-11-22 ENCOUNTER — OFFICE VISIT (OUTPATIENT)
Dept: SURGERY | Facility: CLINIC | Age: 37
End: 2017-11-22

## 2017-11-22 VITALS
HEIGHT: 63 IN | SYSTOLIC BLOOD PRESSURE: 130 MMHG | TEMPERATURE: 98.3 F | BODY MASS INDEX: 23.92 KG/M2 | OXYGEN SATURATION: 98 % | HEART RATE: 129 BPM | WEIGHT: 135 LBS | DIASTOLIC BLOOD PRESSURE: 80 MMHG

## 2017-11-22 DIAGNOSIS — Z48.89 POSTOPERATIVE VISIT: Primary | ICD-10-CM

## 2017-11-22 PROCEDURE — 99024 POSTOP FOLLOW-UP VISIT: CPT | Performed by: SURGERY

## 2017-11-22 NOTE — PROGRESS NOTES
Patient: Marlin Pena    YOB: 1980    Date: 11/22/2017    Primary Care Provider: Kris Melton MD    Reason for Consultation: Follow-up hernia    Chief Complaint:   Chief Complaint   Patient presents with   • Post-op Hernia     needs catheter removed       History of present illness:  I saw the patient in the office today as a followup from their recent hernia repair, she also needs her catheter taken out.  They state that they are having intense pain and soreness from her surgery.  Patient had a bladder diverticulum involving or femoral hernia, bladder is healed nicely.  No hematuria.  Powell catheter removed today.      Vital Signs:   Temp:  [98.3 °F (36.8 °C)] 98.3 °F (36.8 °C)  Heart Rate:  [129] 129  BP: (130)/(80) 130/80    Physical Exam:   General Appearance:    Alert, cooperative, in no acute distress   Abdomen:     no masses, no organomegaly, soft non-tender, non-distended, no guarding, wounds are well healed, no evidence of recurrent hernia     Assessment / Plan:    1. Postoperative visit        I did discuss the situation with the patient today in the office and they have done well from their recent herniorraphy.I have refilled her pain medication and will follow-up in 2 weeks.  Patient encouraged to ambulate, limit pain medication and add Motrin to her regimen.  I have released the patient back to normal activity, they understand that they need to be careful about heavy lifting.  I need to see the patient back in 2 weeks.    Electronically signed by Jennifer Torrez MD  11/22/17        Scribed for Jennifer Torrez MD by Akiko Locke. 11/22/2017  9:58 AM

## 2017-12-06 ENCOUNTER — OFFICE VISIT (OUTPATIENT)
Dept: SURGERY | Facility: CLINIC | Age: 37
End: 2017-12-06

## 2017-12-06 VITALS
TEMPERATURE: 98.4 F | DIASTOLIC BLOOD PRESSURE: 78 MMHG | OXYGEN SATURATION: 98 % | WEIGHT: 134.92 LBS | HEART RATE: 95 BPM | SYSTOLIC BLOOD PRESSURE: 128 MMHG | BODY MASS INDEX: 23.91 KG/M2 | HEIGHT: 63 IN

## 2017-12-06 DIAGNOSIS — Z48.89 POSTOPERATIVE VISIT: Primary | ICD-10-CM

## 2017-12-06 PROCEDURE — 99024 POSTOP FOLLOW-UP VISIT: CPT | Performed by: SURGERY

## 2017-12-06 NOTE — PROGRESS NOTES
Patient: Marlin Pena    YOB: 1980    Date: 12/06/2017    Primary Care Provider: Kris Melton MD    Reason for Consultation: Follow-up hernia    Chief Complaint:   Chief Complaint   Patient presents with   • Post-op     Post op femoral hernia repair       History of present illness:  I saw the patient in the office today as a followup from their recent femoral hernia repair.  She also had to have a Powell catheter placed afterwards.  That was removed 2 weeks ago.  They state that they have done well but still complain of RLQ pain that radiates into her back.  She says it is sometimes a 7 on the pain scale.  She denies any urination problems.    The following portions of the patient's history were reviewed and updated as appropriate: allergies, current medications, past family history, past medical history, past social history, past surgical history and problem list.    Vital Signs:   Temp:  [98.4 °F (36.9 °C)] 98.4 °F (36.9 °C)  Heart Rate:  [95] 95  BP: (128)/(78) 128/78    Physical Exam:   General Appearance:    Alert, cooperative, in no acute distress   Abdomen:     no masses, no organomegaly, soft non-tender, non-distended, no guarding, wounds are well healed, no evidence of recurrent hernia     Results Review:   I reviewed the patient's new clinical results.    Assessment / Plan:    1. Postoperative visit        I did discuss the situation with the patient today in the office and they have done well from their recent herniorraphy.  I have released the patient back to normal activity, they understand that they need to be careful about heavy lifting.  I need to see the patient back in the office only if they are having further problems, they know to call me if they are.Reassured patient, continue limited lifting for 4 more weeks.    Electronically signed by Jennifer Torrez MD  12/06/17    Scribed for Jennifer Torrez MD by Michelle Milligan. 12/6/2017  10:11 AM

## 2017-12-13 ENCOUNTER — LAB (OUTPATIENT)
Dept: LAB | Facility: HOSPITAL | Age: 37
End: 2017-12-13
Attending: SURGERY

## 2017-12-13 DIAGNOSIS — R30.0 DIFFICULT OR PAINFUL URINATION: Primary | ICD-10-CM

## 2017-12-13 DIAGNOSIS — R30.0 DIFFICULT OR PAINFUL URINATION: ICD-10-CM

## 2017-12-13 LAB
BACTERIA UR QL AUTO: ABNORMAL /HPF
BILIRUB UR QL STRIP: NEGATIVE
CLARITY UR: CLEAR
COLOR UR: YELLOW
GLUCOSE UR STRIP-MCNC: NEGATIVE MG/DL
HGB UR QL STRIP.AUTO: NEGATIVE
HYALINE CASTS UR QL AUTO: ABNORMAL /LPF
KETONES UR QL STRIP: ABNORMAL
LEUKOCYTE ESTERASE UR QL STRIP.AUTO: NEGATIVE
MUCOUS THREADS URNS QL MICRO: ABNORMAL /HPF
NITRITE UR QL STRIP: NEGATIVE
PH UR STRIP.AUTO: 6.5 [PH] (ref 5–8)
PROT UR QL STRIP: NEGATIVE
RBC # UR: ABNORMAL /HPF
REF LAB TEST METHOD: ABNORMAL
SP GR UR STRIP: 1.02 (ref 1–1.03)
SQUAMOUS #/AREA URNS HPF: ABNORMAL /HPF
UROBILINOGEN UR QL STRIP: ABNORMAL
WBC UR QL AUTO: ABNORMAL /HPF

## 2017-12-13 PROCEDURE — 81001 URINALYSIS AUTO W/SCOPE: CPT

## 2018-05-17 ENCOUNTER — OFFICE VISIT (OUTPATIENT)
Dept: SURGERY | Facility: CLINIC | Age: 38
End: 2018-05-17

## 2018-05-17 VITALS
OXYGEN SATURATION: 100 % | SYSTOLIC BLOOD PRESSURE: 130 MMHG | WEIGHT: 134.92 LBS | HEART RATE: 86 BPM | DIASTOLIC BLOOD PRESSURE: 67 MMHG | HEIGHT: 63 IN | BODY MASS INDEX: 23.91 KG/M2 | TEMPERATURE: 98 F

## 2018-05-17 DIAGNOSIS — K52.9 CHRONIC DIARRHEA: ICD-10-CM

## 2018-05-17 DIAGNOSIS — R10.31 RIGHT LOWER QUADRANT ABDOMINAL PAIN: Primary | ICD-10-CM

## 2018-05-17 DIAGNOSIS — R10.31 PAIN, ABDOMINAL, RLQ: Primary | ICD-10-CM

## 2018-05-17 PROCEDURE — 99213 OFFICE O/P EST LOW 20 MIN: CPT | Performed by: SURGERY

## 2018-05-17 NOTE — PROGRESS NOTES
Patient: Marlin Pena    YOB: 1980    Date: 05/17/2018    Primary Care Provider: Kris Melton MD    Chief Complaint   Patient presents with   • Post-op Hernia       History: The patient is in the office today for a follow up after her femoral hernia repair 6 months ago.  She has had intermittent stabbing pain in her RLQ that radiates into her lower back since the surgery.  She has constipation alternating with loose stool.  She denies a lump or knot in that region.  Patient has history of kidney stones, recent UA was negative. Patient was to make sure there is no recurrence of hernia.    The following portions of the patient's history were reviewed and updated as appropriate: allergies, current medications, past family history, past medical history, past social history, past surgical history and problem list.      Review of Systems   Constitutional: Negative for chills, fever and unexpected weight change.   HENT: Negative for hearing loss, trouble swallowing and voice change.    Eyes: Negative for visual disturbance.   Respiratory: Negative for apnea, cough, chest tightness, shortness of breath and wheezing.    Cardiovascular: Negative for chest pain, palpitations and leg swelling.   Gastrointestinal: Positive for abdominal pain, constipation and diarrhea. Negative for abdominal distention, anal bleeding, blood in stool, nausea, rectal pain and vomiting.   Endocrine: Negative for cold intolerance and heat intolerance.   Genitourinary: Negative for difficulty urinating, dysuria and flank pain.   Musculoskeletal: Negative for back pain and gait problem.   Skin: Negative for color change, rash and wound.   Neurological: Negative for dizziness, syncope, speech difficulty, weakness, light-headedness, numbness and headaches.   Hematological: Negative for adenopathy. Does not bruise/bleed easily.   Psychiatric/Behavioral: Negative for confusion. The patient is not nervous/anxious.        Vital  "Signs  Vitals:    05/17/18 0950   BP: 130/67   Pulse: 86   Temp: 98 °F (36.7 °C)   TempSrc: Temporal Artery    SpO2: 100%   Weight: 61.2 kg (134 lb 14.7 oz)   Height: 160 cm (62.99\")       Allergies:  Allergies   Allergen Reactions   • Sulfa Antibiotics Other (See Comments)     REPORTS CAUSED HAND TREMORS AND HEART PALPITATIONS         Medications:    Current Outpatient Prescriptions:   •  albuterol (PROVENTIL HFA;VENTOLIN HFA) 108 (90 Base) MCG/ACT inhaler, Inhale 2 puffs Every 4 (Four) Hours As Needed for Wheezing., Disp: , Rfl:   •  albuterol (PROVENTIL) (5 MG/ML) 0.5% nebulizer solution, Take 2.5 mg by nebulization Every 6 (Six) Hours As Needed for Wheezing., Disp: , Rfl:   •  clonazePAM (KLONOPIN) 1 MG tablet, Take 1 mg by mouth At Night As Needed for Anxiety or Seizures. REPORTS SHE TAKES 2 OF THE 0.5MG TABLETS (FOR TOTAL DOSE OF 1MG) AT NIGHT, Disp: , Rfl:   •  fluticasone (FLOVENT HFA) 220 MCG/ACT inhaler, Inhale 1 puff Every Night., Disp: , Rfl:   •  gabapentin (NEURONTIN) 300 MG capsule, Take 300 mg by mouth Every Night., Disp: , Rfl:   •  HYDROcodone-acetaminophen (NORCO) 5-325 MG per tablet, Take 1-2 tablets by mouth Every 4 (Four) Hours As Needed (Pain)., Disp: 28 tablet, Rfl: 0  •  ibuprofen (ADVIL,MOTRIN) 600 MG tablet, Take 600 mg by mouth Every 6 (Six) Hours As Needed for Mild Pain  or Headache., Disp: , Rfl:   •  Loratadine-Pseudoephedrine (CLARITIN-D 24 HOUR PO), Take 1 tablet by mouth Daily., Disp: , Rfl:   •  melatonin 5 MG sublingual tablet sublingual tablet, Place 10 mg under the tongue Every Night., Disp: , Rfl:   •  Probiotic Product (PROBIOTIC ADVANCED PO), Take 1 tablet by mouth Daily., Disp: , Rfl:   •  theophylline (UNIPHYL) 600 MG 24 hr tablet, Take 600 mg by mouth Daily., Disp: , Rfl:   •  tiZANidine (ZANAFLEX) 4 MG tablet, Take 8 mg by mouth At Night As Needed for Muscle Spasms., Disp: , Rfl:   •  traMADol (ULTRAM) 50 MG tablet, Take 50 mg by mouth Every 6 (Six) Hours As Needed for " Moderate Pain ., Disp: , Rfl:     Physical Exam:   General Appearance:    Alert, cooperative, in no acute distress   Head:    Normocephalic, without obvious abnormality, atraumatic   Lungs:     Clear to auscultation,respirations regular, even and                  unlabored    Heart:    Regular rhythm and normal rate, normal S1 and S2, no            murmur, no gallop, no rub, no click   Abdomen:     Normal bowel sounds, no masses, no organomegaly, Soft and tender right lower quadrant without guarding or rebound. No recurrent hernia and right groin. Scar is tender.    Extremities:   Moves all extremities well, no edema, no cyanosis, no             redness   Pulses:   Pulses palpable and equal bilaterally   Skin:   No bleeding, bruising or rash       Results Review:   I reviewed the patient's new clinical results.     Assessment/Plan     1. Right lower quadrant abdominal pain    2. Chronic diarrhea    Recommend UA specimen, ultrasound of right groin and right ovary. This was scheduled for next week. Patient reassured there is no recurrent hernia at this time and no significant findings on the right lower quadrant.    Electronically signed by Jennifer Torrez MD  05/17/18    Scribed for Jennifer Torrez MD by Michelle Milligan. 5/17/2018  12:04 PM

## 2018-05-17 NOTE — PROGRESS NOTES
"Patient: Marlin Pena    YOB: 1980    Date: 05/17/2018    Primary Care Provider: Kris Melton MD    Reason for Consultation: Follow-up hernia    Chief Complaint   Patient presents with   • Post-op Hernia       History of present illness:  I saw the patient in the office today as a followup from their recent hernia repair.  They state that they have done well and are having no complaints.    The following portions of the patient's history were reviewed and updated as appropriate: allergies, current medications, past family history, past medical history, past social history, past surgical history and problem list.    Vital Signs:   There were no vitals filed for this visit.    Physical Exam:   General Appearance:    Alert, cooperative, in no acute distress   Abdomen:     no masses, no organomegaly, soft non-tender, non-distended, no guarding, wounds are well healed, no evidence of recurrent hernia     Results Review:   {Results Review:25546::\"I reviewed the patient's new clinical results.\"}    Assessment / Plan:    No diagnosis found.    I did discuss the situation with the patient today in the office and they have done well from their recent herniorraphy.  I have released the patient back to normal activity, they understand that they need to be careful about heavy lifting.  I need to see the patient back in the office only if they are having further problems, they know to call me if they are.    Electronically signed by Michelle L Milligan, MA  05/17/18    Scribed for Jennifer Torrez MD by Michelle Milligan. 5/17/2018  9:44 AM              "

## 2018-05-21 ENCOUNTER — OFFICE VISIT (OUTPATIENT)
Dept: SURGERY | Facility: CLINIC | Age: 38
End: 2018-05-21

## 2018-05-21 VITALS
BODY MASS INDEX: 23.11 KG/M2 | HEART RATE: 92 BPM | HEIGHT: 63 IN | WEIGHT: 130.4 LBS | TEMPERATURE: 98.1 F | SYSTOLIC BLOOD PRESSURE: 120 MMHG | DIASTOLIC BLOOD PRESSURE: 70 MMHG | OXYGEN SATURATION: 99 %

## 2018-05-21 DIAGNOSIS — R10.31 INGUINAL PAIN, RIGHT: Primary | ICD-10-CM

## 2018-05-21 PROCEDURE — 99213 OFFICE O/P EST LOW 20 MIN: CPT | Performed by: SURGERY

## 2018-05-21 NOTE — PROGRESS NOTES
Patient: Marlin Silva    YOB: 1980    Date: 05/21/2018    Primary Care Provider: Kris Melton MD    Chief Complaint   Patient presents with   • Follow-up     RLQ pain-needs ultrasound        History: The patient is here today for a follow up for intermittent stabbing RLQ pain.  She states the pain radiates into her back.  She did have Rt femoral hernia repair 6 months ago.  She is here for an ultrasound.   Ultrasound today indicates no recurrent hernia.  No abnormality on the right ovary or the right ureter.  Patient's pain has been intermittent but less severe today.  Overall feels much better.  Pain is relieved after a bowel movement and into her bladder.  No rectal bleeding, no urinary bleeding.    The following portions of the patient's history were reviewed and updated as appropriate: allergies, current medications, past family history, past medical history, past social history, past surgical history and problem list.      Review of Systems   Constitutional: Negative for chills, fever and unexpected weight change.   HENT: Negative for hearing loss, trouble swallowing and voice change.    Eyes: Negative for visual disturbance.   Respiratory: Negative for apnea, cough, chest tightness, shortness of breath and wheezing.    Cardiovascular: Negative for chest pain, palpitations and leg swelling.   Gastrointestinal: Positive for abdominal pain and diarrhea. Negative for abdominal distention, anal bleeding, blood in stool, constipation, nausea, rectal pain and vomiting.   Endocrine: Negative for cold intolerance and heat intolerance.   Genitourinary: Negative for difficulty urinating, dysuria and flank pain.   Musculoskeletal: Negative for back pain and gait problem.   Skin: Negative for color change, rash and wound.   Neurological: Negative for dizziness, syncope, speech difficulty, weakness, light-headedness, numbness and headaches.   Hematological: Negative for adenopathy. Does not  "bruise/bleed easily.   Psychiatric/Behavioral: Negative for confusion. The patient is not nervous/anxious.        Vital Signs  Vitals:    05/21/18 1532   BP: 120/70   BP Location: Left arm   Pulse: 92   Temp: 98.1 °F (36.7 °C)   TempSrc: Temporal Artery    SpO2: 99%   Weight: 59.1 kg (130 lb 6.4 oz)   Height: 160 cm (63\")       Allergies:  Allergies   Allergen Reactions   • Sulfa Antibiotics Other (See Comments)     REPORTS CAUSED HAND TREMORS AND HEART PALPITATIONS         Medications:    Current Outpatient Prescriptions:   •  albuterol (PROVENTIL HFA;VENTOLIN HFA) 108 (90 Base) MCG/ACT inhaler, Inhale 2 puffs Every 4 (Four) Hours As Needed for Wheezing., Disp: , Rfl:   •  albuterol (PROVENTIL) (5 MG/ML) 0.5% nebulizer solution, Take 2.5 mg by nebulization Every 6 (Six) Hours As Needed for Wheezing., Disp: , Rfl:   •  clonazePAM (KLONOPIN) 1 MG tablet, Take 1 mg by mouth At Night As Needed for Anxiety or Seizures. REPORTS SHE TAKES 2 OF THE 0.5MG TABLETS (FOR TOTAL DOSE OF 1MG) AT NIGHT, Disp: , Rfl:   •  fluticasone (FLOVENT HFA) 220 MCG/ACT inhaler, Inhale 1 puff Every Night., Disp: , Rfl:   •  gabapentin (NEURONTIN) 300 MG capsule, Take 300 mg by mouth Every Night., Disp: , Rfl:   •  HYDROcodone-acetaminophen (NORCO) 5-325 MG per tablet, Take 1-2 tablets by mouth Every 4 (Four) Hours As Needed (Pain)., Disp: 28 tablet, Rfl: 0  •  ibuprofen (ADVIL,MOTRIN) 600 MG tablet, Take 600 mg by mouth Every 6 (Six) Hours As Needed for Mild Pain  or Headache., Disp: , Rfl:   •  Loratadine-Pseudoephedrine (CLARITIN-D 24 HOUR PO), Take 1 tablet by mouth Daily., Disp: , Rfl:   •  melatonin 5 MG sublingual tablet sublingual tablet, Place 10 mg under the tongue Every Night., Disp: , Rfl:   •  Probiotic Product (PROBIOTIC ADVANCED PO), Take 1 tablet by mouth Daily., Disp: , Rfl:   •  theophylline (UNIPHYL) 600 MG 24 hr tablet, Take 600 mg by mouth Daily., Disp: , Rfl:   •  tiZANidine (ZANAFLEX) 4 MG tablet, Take 8 mg by mouth At " Night As Needed for Muscle Spasms., Disp: , Rfl:   •  traMADol (ULTRAM) 50 MG tablet, Take 50 mg by mouth Every 6 (Six) Hours As Needed for Moderate Pain ., Disp: , Rfl:     Physical Exam:   General Appearance:    Alert, cooperative, in no acute distress   Head:    Normocephalic, without obvious abnormality, atraumatic   Lungs:     Clear to auscultation,respirations regular, even and                  unlabored    Heart:    Regular rhythm and normal rate, normal S1 and S2, no            murmur, no gallop, no rub, no click   Abdomen:     Normal bowel sounds, no masses, no organomegaly, soft        non-tender, non-distended, no guarding, no rebound                tenderness   Extremities:   Moves all extremities well, no edema, no cyanosis, no             redness   Pulses:   Pulses palpable and equal bilaterally   Skin:   No bleeding, bruising or rash       Results Review:   I reviewed the patient's new clinical results.     Assessment/Plan     1. Inguinal pain, right      Patient reassured, ultrasound unremarkable.  She'll follow-up in 6 months if no improvement, she feels that this time she was increased her activity and does not one continue further intervention or testing.  Electronically signed by Jennifer Torrez MD  05/21/18    Scribed for Jennifer Torrez MD by Yasmin Dominguez. 5/21/2018  4:00 PM

## 2018-07-30 ENCOUNTER — HOSPITAL ENCOUNTER (OUTPATIENT)
Facility: HOSPITAL | Age: 38
Discharge: HOME | End: 2018-07-30

## 2018-07-30 PROCEDURE — 99001 SPECIMEN HANDLING PT-LAB: CPT

## 2020-08-26 ENCOUNTER — TRANSCRIBE ORDERS (OUTPATIENT)
Dept: ADMINISTRATIVE | Facility: HOSPITAL | Age: 40
End: 2020-08-26

## 2020-08-26 DIAGNOSIS — Z12.31 ENCOUNTER FOR SCREENING MAMMOGRAM FOR BREAST CANCER: Primary | ICD-10-CM

## 2021-01-06 ENCOUNTER — TELEPHONE (OUTPATIENT)
Dept: NEUROLOGY | Facility: CLINIC | Age: 41
End: 2021-01-06

## 2021-01-06 NOTE — TELEPHONE ENCOUNTER
RECEIVED CALL FROM PT'S MOTHER SID REGARDING HER UPCOMING APPT ON 1/8/21. SHE STATED PT HAD RECENT ER VISIT AT [facility] IN [place] AND FELT IT WAS NECESSARY TO HAVE THOSE RECORDS. REACHED OUT TO SANDI AT [facility] IN [place] ([phone])  WHO STATED THEY WOULD SEND OVER ER VISIT NOTE. PROVIDED BOTH HUB FAX NUMBER AND OFFICE FAX NUMBER SINCE PT'S APPT IS [facility] FOR Friday. PLEASE BE ADVISED.

## 2021-01-08 ENCOUNTER — OFFICE VISIT (OUTPATIENT)
Dept: NEUROLOGY | Facility: CLINIC | Age: 41
End: 2021-01-08

## 2021-01-08 ENCOUNTER — LAB (OUTPATIENT)
Dept: LAB | Facility: HOSPITAL | Age: 41
End: 2021-01-08

## 2021-01-08 VITALS
BODY MASS INDEX: 26.75 KG/M2 | RESPIRATION RATE: 18 BRPM | HEART RATE: 84 BPM | WEIGHT: 151 LBS | SYSTOLIC BLOOD PRESSURE: 122 MMHG | DIASTOLIC BLOOD PRESSURE: 78 MMHG | TEMPERATURE: 97.8 F | HEIGHT: 63 IN | OXYGEN SATURATION: 98 %

## 2021-01-08 DIAGNOSIS — R20.2 PARESTHESIAS IN RIGHT HAND: ICD-10-CM

## 2021-01-08 DIAGNOSIS — M54.2 CERVICALGIA: ICD-10-CM

## 2021-01-08 DIAGNOSIS — M54.2 CERVICALGIA: Primary | ICD-10-CM

## 2021-01-08 PROCEDURE — 83921 ORGANIC ACID SINGLE QUANT: CPT

## 2021-01-08 PROCEDURE — 99244 OFF/OP CNSLTJ NEW/EST MOD 40: CPT | Performed by: NURSE PRACTITIONER

## 2021-01-08 PROCEDURE — 82607 VITAMIN B-12: CPT

## 2021-01-08 PROCEDURE — 82746 ASSAY OF FOLIC ACID SERUM: CPT

## 2021-01-08 PROCEDURE — 36415 COLL VENOUS BLD VENIPUNCTURE: CPT

## 2021-01-08 RX ORDER — NABUMETONE 500 MG/1
500 TABLET, FILM COATED ORAL 2 TIMES DAILY PRN
Qty: 60 TABLET | Refills: 2 | Status: ON HOLD | OUTPATIENT
Start: 2021-01-08 | End: 2021-06-15

## 2021-01-08 RX ORDER — ESCITALOPRAM OXALATE 10 MG/1
10 TABLET ORAL DAILY
COMMUNITY
End: 2021-06-15

## 2021-01-08 RX ORDER — PANTOPRAZOLE SODIUM 40 MG/1
40 TABLET, DELAYED RELEASE ORAL NIGHTLY
COMMUNITY

## 2021-01-08 RX ORDER — HYDROCODONE BITARTRATE AND ACETAMINOPHEN 7.5; 325 MG/1; MG/1
1 TABLET ORAL EVERY 6 HOURS PRN
COMMUNITY

## 2021-01-08 NOTE — PATIENT INSTRUCTIONS
You have been prescribed NSAIDs for your pain. Do not take these prescription-strength formulas with other medications from the pharmacy such as ibuprofen, Motrin, Advil, or Aleve. Please take this medication with food.  Cervical Sprain    A cervical sprain is a stretch or tear in one or more of the tough, cord-like tissues that connect bones (ligaments) in the neck. Cervical sprains can range from mild to severe. Severe cervical sprains can cause the spinal bones (vertebrae) in the neck to be unstable. This can lead to spinal cord damage and can result in serious nervous system problems.  The amount of time that it takes for a cervical sprain to get better depends on the cause and extent of the injury. Most cervical sprains heal in 4-6 weeks.  What are the causes?  Cervical sprains may be caused by an injury (trauma), such as from a motor vehicle accident, a fall, or sudden forward and backward whipping movement of the head and neck (whiplash injury).  Mild cervical sprains may be caused by wear and tear over time, such as from poor posture, sitting in a chair that does not provide support, or looking up or down for long periods of time.  What increases the risk?  The following factors may make you more likely to develop this condition:  · Participating in activities that have a high risk of trauma to the neck. These include contact sports, auto racing, gymnastics, and diving.  · Taking risks when driving or riding in a motor vehicle, such as speeding.  · Having osteoarthritis of the spine.  · Having poor strength and flexibility of the neck.  · A previous neck injury.  · Having poor posture.  · Spending a lot of time in certain positions that put stress on the neck, such as sitting at a computer for long periods of time.  What are the signs or symptoms?  Symptoms of this condition include:  · Pain, soreness, stiffness, tenderness, swelling, or a burning sensation in the front, back, or sides of the neck.  · Sudden  tightening of neck muscles that you cannot control (muscle spasms).  · Pain in the shoulders or upper back.  · Limited ability to move the neck.  · Headache.  · Dizziness.  · Nausea.  · Vomiting.  · Weakness, numbness, or tingling in a hand or an arm.  Symptoms may develop right away after injury, or they may develop over a few days. In some cases, symptoms may go away with treatment and return (recur) over time.  How is this diagnosed?  This condition may be diagnosed based on:  · Your medical history.  · Your symptoms.  · Any recent injuries or known neck problems that you have, such as arthritis in the neck.  · A physical exam.  · Imaging tests, such as:  ? X-rays.  ? MRI.  ? CT scan.  How is this treated?  This condition is treated by resting and icing the injured area and doing physical therapy exercises. Depending on the severity of your condition, treatment may also include:  · Keeping your neck in place (immobilized) for periods of time. This may be done using:  ? A cervical collar. This supports your chin and the back of your head.  ? A cervical traction device. This is a sling that holds up your head. This removes weight and pressure from your neck, and it may help to relieve pain.  · Medicines that help to relieve pain and inflammation.  · Medicines that help to relax your muscles (muscle relaxants).  · Surgery. This is rare.  Follow these instructions at home:  If you have a cervical collar:    · Wear it as told by your health care provider. Do not remove the collar unless instructed by your health care provider.  · Ask your health care provider before you make any adjustments to your collar.  · If you have long hair, keep it outside of the collar.  · Ask your health care provider if you can remove the collar for cleaning and bathing. If you are allowed to remove the collar for cleaning or bathing:  ? Follow instructions from your health care provider about how to remove the collar safely.  ? Clean the  collar by wiping it with mild soap and water and drying it completely.  ? If your collar has removable pads, remove them every 1-2 days and wash them by hand with soap and water. Let them air-dry completely before you put them back in the collar.  ? Check your skin under the collar for irritation or sores. If you see any, tell your health care provider.  Managing pain, stiffness, and swelling    · If directed, use a cervical traction device as told by your health care provider.  · If directed, apply heat to the affected area before you do your physical therapy or as often as told by your health care provider. Use the heat source that your health care provider recommends, such as a moist heat pack or a heating pad.  ? Place a towel between your skin and the heat source.  ? Leave the heat on for 20-30 minutes.  ? Remove the heat if your skin turns bright red. This is especially important if you are unable to feel pain, heat, or cold. You may have a greater risk of getting burned.  · If directed, put ice on the affected area:  ? Put ice in a plastic bag.  ? Place a towel between your skin and the bag.  ? Leave the ice on for 20 minutes, 2-3 times a day.  Activity  · Do not drive while wearing a cervical collar. If you do not have a cervical collar, ask your health care provider if it is safe to drive while your neck heals.  · Do not drive or use heavy machinery while taking prescription pain medicine or muscle relaxants, unless your health care provider approves.  · Do not lift anything that is heavier than 10 lb (4.5 kg) until your health care provider tells you that it is safe.  · Rest as directed by your health care provider. Avoid positions and activities that make your symptoms worse. Ask your health care provider what activities are safe for you.  · If physical therapy was prescribed, do exercises as told by your health care provider or physical therapist.  General instructions  · Take over-the-counter and  prescription medicines only as told by your health care provider.  · Do not use any products that contain nicotine or tobacco, such as cigarettes and e-cigarettes. These can delay healing. If you need help quitting, ask your health care provider.  · Keep all follow-up visits as told by your health care provider or physical therapist. This is important.  How is this prevented?  To prevent a cervical sprain from happening again:  · Use and maintain good posture. Make any needed adjustments to your workstation to help you use good posture.  · Exercise regularly as directed by your health care provider or physical therapist.  · Avoid risky activities that may cause a cervical sprain.  Contact a health care provider if:  · You have symptoms that get worse or do not get better after 2 weeks of treatment.  · You have pain that gets worse or does not get better with medicine.  · You develop new, unexplained symptoms.  · You have sores or irritated skin on your neck from wearing your cervical collar.  Get help right away if:  · You have severe pain.  · You develop numbness, tingling, or weakness in any part of your body.  · You cannot move a part of your body (you have paralysis).  · You have neck pain along with:  ? Severe dizziness.  ? Headache.  Summary  · A cervical sprain is a stretch or tear in one or more of the tough, cord-like tissues that connect bones (ligaments) in the neck.  · Cervical sprains may be caused by an injury (trauma), such as from a motor vehicle accident, a fall, or sudden forward and backward whipping movement of the head and neck (whiplash injury).  · Symptoms may develop right away after injury, or they may develop over a few days.  · This condition is treated by resting and icing the injured area and doing physical therapy exercises.  This information is not intended to replace advice given to you by your health care provider. Make sure you discuss any questions you have with your health care  provider.  Document Revised: 04/08/2020 Document Reviewed: 08/16/2017  [employer] Patient Education © 2020 [employer]

## 2021-01-08 NOTE — PROGRESS NOTES
New Neurology Patient Office Visit      Patient Name: Marlin Silva    Referring Physician: Kris Melton*    Chief Complaint:    Chief Complaint   Patient presents with   • Consult     NP,in office today for pain, numbness and tingling in neck and face       History of Present Illness: Marlin Silva is a 40 y.o. female who is here today to establish care with Neurology. She presents after recent ED visit for neck pain with associated facial tingling and right arm pain. 'I have fibro and I'm always in pain'. ER gave her robaxin which helped, she underwent CT Cspine which did some 'tiny disc protrusions' per radiologist report. 'I feel like my body is going downhill since then'. . She also complains of numbness in fingers and exhaustion. She has asthma, and self dosed with prednisone for relief.  'Klonopin has been my saving whitney', she has also been using robaxin with good effect. She does still have some pain in her right shoulder, but is no longer experiencing facial numbness or tingling in fingers. Denies ever experiencing weakness in extremity.    She states she went to a neurologist several years ago and was told she might have MS. She did undergo brain scan, which did not demonstrate any lesions per patient. She denies ever undergoing LP or receiving DMT.     The following portions of the patient's history were reviewed and updated as appropriate: allergies, current medications, past family history, past medical history, past social history, past surgical history and problem list.    Subjective      Review of Systems:   Review of Systems   Constitutional: Positive for fatigue.   Respiratory: Negative.    Cardiovascular: Negative.    Gastrointestinal: Negative.    Musculoskeletal: Positive for arthralgias and neck pain.   Skin: Negative.    Allergic/Immunologic: Negative.    Neurological: Positive for numbness.   Hematological: Negative.    Psychiatric/Behavioral: The patient is  "nervous/anxious.        Past Medical History:   Past Medical History:   Diagnosis Date   • Arthritis    • Asthma    • Depression    • Dysphagia     REPORTS \"IT'S LIKE THINGS WON'T GO DOWN SOMETIMES\"   • Fibromyalgia    • GERD (gastroesophageal reflux disease)     PATIENT REPORTS HAD ER VISIT IN DECEMBER OF 2016 FOR RIGHT SIDED PAIN.  PATIENT THOUGHT MAY BE HEART RELATED BUT REPORTS WAS TOLD ONLY REFLUX.   • History of bronchitis    • History of kidney stones     REPORTS PASSED WITHOUT SURGICAL INTERVENTION IN SUMMER OF 2017   • History of panic attacks    • History of pneumonia    • Insomnia    • MRSA (methicillin resistant Staphylococcus aureus) 2008    REPORTS IN SINUS CAVITY AND THAT SHE WAS TREATED   • Neck pain    • TIA (transient ischemic attack)     REPORTS HX OF TIA 8 YEARS AGO AND THAT SHE WAS TAKEN OFF OF BCP.  NO RESIDUAL NOTED   • Wears glasses     FOR READING     Past Surgical History:   Past Surgical History:   Procedure Laterality Date   • BREAST AUGMENTATION     • COLONOSCOPY     • DILATION AND CURETTAGE, DIAGNOSTIC / THERAPEUTIC     • ENDOSCOPY     • INGUINAL HERNIA REPAIR Right 11/20/2017    Procedure: REPAIR OF INCARCERATED RIGHT FEMORAL HERNIA, PRIMARY REPAIR OF BLADDER;  Surgeon: Jennifer Torrez MD;  Location: [facility];  Service:    • SINUS SURGERY     • WISDOM TOOTH EXTRACTION       Family History:   Family History   Problem Relation Age of Onset   • Hypertension Father    • Cancer Maternal Aunt    • Diabetes Paternal Uncle      Social History:   Social History     Socioeconomic History   • Marital status:      Spouse name: Not on file   • Number of children: Not on file   • Years of education: Not on file   • Highest education level: Not on file   Tobacco Use   • Smoking status: Never Smoker   • Smokeless tobacco: Never Used   Substance and Sexual Activity   • Alcohol use: Yes     Comment: SOCIAL USE ONLY, NO HX OF ABUSE REPORTED   • Drug use: No   • Sexual activity: Defer "     Medications:     Current Outpatient Medications:   •  albuterol (PROVENTIL HFA;VENTOLIN HFA) 108 (90 Base) MCG/ACT inhaler, Inhale 2 puffs Every 4 (Four) Hours As Needed for Wheezing., Disp: , Rfl:   •  albuterol (PROVENTIL) (5 MG/ML) 0.5% nebulizer solution, Take 2.5 mg by nebulization Every 6 (Six) Hours As Needed for Wheezing., Disp: , Rfl:   •  clonazePAM (KLONOPIN) 1 MG tablet, Take 1 mg by mouth 2 (Two) Times a Day As Needed for Anxiety or Seizures. REPORTS SHE TAKES 2 OF THE 0.5MG TABLETS (FOR TOTAL DOSE OF 1MG) AT NIGHT, Disp: , Rfl:   •  escitalopram (LEXAPRO) 10 MG tablet, Take 10 mg by mouth Daily., Disp: , Rfl:   •  fluticasone (FLOVENT HFA) 220 MCG/ACT inhaler, Inhale 1 puff Every Night., Disp: , Rfl:   •  HYDROcodone-acetaminophen (NORCO) 7.5-325 MG per tablet, Take 1 tablet by mouth Every 6 (Six) Hours As Needed for Moderate Pain ., Disp: , Rfl:   •  Loratadine-Pseudoephedrine (CLARITIN-D 24 HOUR PO), Take 1 tablet by mouth Daily., Disp: , Rfl:   •  melatonin 5 MG sublingual tablet sublingual tablet, Place 10 mg under the tongue Every Night., Disp: , Rfl:   •  pantoprazole (PROTONIX) 40 MG EC tablet, Take 40 mg by mouth Every Night., Disp: , Rfl:   •  Probiotic Product (PROBIOTIC ADVANCED PO), Take 1 tablet by mouth Daily., Disp: , Rfl:   •  theophylline (UNIPHYL) 600 MG 24 hr tablet, Take 600 mg by mouth Daily., Disp: , Rfl:   •  traMADol (ULTRAM) 50 MG tablet, Take 50 mg by mouth Every 6 (Six) Hours As Needed for Moderate Pain ., Disp: , Rfl:   •  nabumetone (RELAFEN) 500 MG tablet, Take 1 tablet by mouth 2 (Two) Times a Day As Needed for Moderate Pain  (neck pain)., Disp: 60 tablet, Rfl: 2    Allergies:   Allergies   Allergen Reactions   • Sulfa Antibiotics Other (See Comments)     REPORTS CAUSED HAND TREMORS AND HEART PALPITATIONS         Objective     Physical Exam:  Vital Signs:   Vitals:    01/08/21 1355   BP: 122/78   BP Location: Left arm   Patient Position: Sitting   Cuff Size: Adult  "  Pulse: 84   Resp: 18   Temp: 97.8 °F (36.6 °C)   SpO2: 98%   Weight: 68.5 kg (151 lb)   Height: 160 cm (63\")   PainSc:   2   PainLoc: Abdomen  Comment: all over body       Physical Exam  Vitals signs and nursing note reviewed.   Eyes:      Extraocular Movements: EOM normal.      Pupils: Pupils are equal, round, and reactive to light.   Pulmonary:      Effort: Pulmonary effort is normal.   Skin:     General: Skin is warm.   Neurological:      General: No focal deficit present.      Mental Status: She is oriented to person, place, and time.      Coordination: Finger-Nose-Finger Test, Heel to Shin Test and Romberg Test normal.      Gait: Gait is intact. Tandem walk normal.      Deep Tendon Reflexes: Strength normal.      Reflex Scores:       Bicep reflexes are 2+ on the right side and 2+ on the left side.       Brachioradialis reflexes are 1+ on the right side and 1+ on the left side.       Patellar reflexes are 2+ on the right side and 2+ on the left side.       Achilles reflexes are 1+ on the right side and 1+ on the left side.  Psychiatric:         Speech: Speech normal.         Behavior: Behavior normal.         Neurologic Exam     Mental Status   Oriented to person, place, and time.   Attention: normal. Concentration: normal.   Speech: speech is normal   Level of consciousness: alert  Knowledge: consistent with education.   Normal comprehension.     Cranial Nerves     CN II   Visual fields full to confrontation.   Visual acuity: normal    CN III, IV, VI   Pupils are equal, round, and reactive to light.  Extraocular motions are normal.   Right pupil: Shape: regular. Reactivity: brisk.   Left pupil: Shape: regular. Reactivity: brisk.   Diplopia: none  Ophthalmoparesis: none  Upgaze: normal  Downgaze: normal    CN V   Facial sensation intact.     CN VII   Facial expression full, symmetric.     CN VIII   CN VIII normal.     CN IX, X   CN IX normal.   CN X normal.     CN XI   CN XI normal.     CN XII   CN XII normal. "     Motor Exam   Muscle bulk: normal  Overall muscle tone: normal    Strength   Strength 5/5 throughout.     Sensory Exam   Light touch normal.     Gait, Coordination, and Reflexes     Gait  Gait: normal    Coordination   Romberg: negative  Finger to nose coordination: normal  Heel to shin coordination: normal  Tandem walking coordination: normal    Tremor   Resting tremor: absent    Reflexes   Right brachioradialis: 1+  Left brachioradialis: 1+  Right biceps: 2+  Left biceps: 2+  Right patellar: 2+  Left patellar: 2+  Right achilles: 1+  Left achilles: 1+  Right Law: absent  Left Law: absent     CT CSPINE WO 11/5/2020 (images unavailable for review, radiologist report)  Findings: There is no acute fracture.  There is no subluxation.  The soft tissues are unremarkable.  There is mild leftward curvature of the cervical spine.  There is straightening of the normal cervical curvature which may be due to spasm or positioning.  There are tiny central disc protrusions at C4-5 and C5-6.  Limited images of the lung apices are unremarkable.  Impression: No acute fracture.  Tiny central disc protrusions at C4-5 and C5-6.  MRI recommended.  Images reviewed, interpreted, and dictated by Dr. Samir Petersen    Results Review:   I reviewed the patient's new clinical results.  I have reviewed the patient's other medical records to include, labs, radiology and referrals.   I reviewed the patient's new imaging results and agree with the interpretation.    Assessment / Plan      Assessment/Plan:   Diagnoses and all orders for this visit:    1. Cervicalgia (Primary)  -     nabumetone (RELAFEN) 500 MG tablet; Take 1 tablet by mouth 2 (Two) Times a Day As Needed for Moderate Pain  (neck pain).  Dispense: 60 tablet; Refill: 2  -     Methylmalonic Acid, Serum; Future  -     Vitamin B12; Future  -     Folate; Future  -     MRI Cervical Spine Without Contrast; Future    Patient was initially referred by primary care for paresthesias,  "but has multiple complaints today including neck pain, numbness, fatigue, and body aches.  She reports previous diagnosis with fibromyalgia, and symptoms seem more consistent with fibromyalgia and acute cervical muscle strain than true neurologic process.  Patient states she is taking Robaxin and Klonopin as prescribed by primary care with improvement in symptoms.  She is no longer experiencing numbness and tingling in face or extremities.  I have discussed CT C-spine results with her, per radiologist recommendation cervical spine MRI has been ordered to assess disc protrusions for possible cervical nerve root involvement of symptoms.  I have also offered EMG, which she declined today.  Patient states that she has been taking daily ibuprofen, I discussed prescription strength NSAID which patient would like to try, I have prescribed nabumetone for her and reviewed dosing instructions.  She is aware that she is not to take nabumetone with OTC NSAIDs.  I have also ordered B vitamin studies to assess for metabolic causes of numbness and tingling.  Patient states that she was told that she \"might have MS\" by a previous neurologist, but states that she underwent brain MRI which did not demonstrate any lesions.  She mentions that she does have have an aunt with multiple sclerosis.  Given that patient has had multiple persistent complaints over the years and family history of multiple sclerosis, demyelinating disease cannot be excluded.  Have ordered contrasted brain MRI for evaluation of possible demyelinating disorder, tumor, or structural abnormality which may be contributing to symptoms.    Follow Up:   Return if symptoms worsen or fail to improve.     LUISA Smart  [facility]   AS THE PROVIDER, I PERSONALLY WORE PPE DURING ENTIRE FACE TO FACE ENCOUNTER IN CLINIC WITH THE PATIENT. PATIENT ALSO WORE PPE DURING ENTIRE FACE TO FACE ENCOUNTER EXCEPT FOR A MAX OF 30 SECONDS DURING NEUROLOGICAL " EVALUATION OF CRANIAL NERVES AND THEN MASK WAS PLACED BACK OVER PATIENT FACE FOR REMAINDER OF VISIT. I WASHED MY HANDS BEFORE AND AFTER VISIT.    Please note that portions of this note may have been completed with a voice recognition program. Efforts were made to edit the dictations, but occasionally words are mistranscribed.

## 2021-01-09 LAB
FOLATE SERPL-MCNC: 15.5 NG/ML (ref 4.78–24.2)
VIT B12 BLD-MCNC: 553 PG/ML (ref 211–946)

## 2021-01-14 LAB
Lab: NORMAL
METHYLMALONATE SERPL-SCNC: 228 NMOL/L (ref 0–378)

## 2021-02-01 ENCOUNTER — HOSPITAL ENCOUNTER (OUTPATIENT)
Dept: MRI IMAGING | Facility: HOSPITAL | Age: 41
Discharge: HOME | End: 2021-02-01

## 2021-02-01 DIAGNOSIS — R20.2 PARESTHESIAS IN RIGHT HAND: ICD-10-CM

## 2021-02-01 DIAGNOSIS — M54.2 CERVICALGIA: ICD-10-CM

## 2021-02-01 PROCEDURE — 0 GADOBENATE DIMEGLUMINE 529 MG/ML SOLUTION: Performed by: NURSE PRACTITIONER

## 2021-02-01 PROCEDURE — 72141 MRI NECK SPINE W/O DYE: CPT

## 2021-02-01 PROCEDURE — 70553 MRI BRAIN STEM W/O & W/DYE: CPT

## 2021-02-01 PROCEDURE — A9577 INJ MULTIHANCE: HCPCS | Performed by: NURSE PRACTITIONER

## 2021-02-01 RX ADMIN — GADOBENATE DIMEGLUMINE 15 ML: 529 INJECTION, SOLUTION INTRAVENOUS at 07:36

## 2021-06-10 ENCOUNTER — LAB (OUTPATIENT)
Dept: LAB | Facility: HOSPITAL | Age: 41
End: 2021-06-10

## 2021-06-10 ENCOUNTER — TRANSCRIBE ORDERS (OUTPATIENT)
Dept: LAB | Facility: HOSPITAL | Age: 41
End: 2021-06-10

## 2021-06-10 DIAGNOSIS — R53.83 FATIGUE, UNSPECIFIED TYPE: ICD-10-CM

## 2021-06-10 DIAGNOSIS — M79.7 FIBROMYALGIA: ICD-10-CM

## 2021-06-10 DIAGNOSIS — M62.81 MUSCLE WEAKNESS (GENERALIZED): ICD-10-CM

## 2021-06-10 DIAGNOSIS — R53.83 FATIGUE, UNSPECIFIED TYPE: Primary | ICD-10-CM

## 2021-06-10 DIAGNOSIS — R20.2 PARESTHESIA OF SKIN: ICD-10-CM

## 2021-06-10 DIAGNOSIS — N64.4 MASTODYNIA: ICD-10-CM

## 2021-06-10 LAB
ALBUMIN SERPL-MCNC: 4.4 G/DL (ref 3.5–5.2)
ALBUMIN/GLOB SERPL: 1.6 G/DL
ALP SERPL-CCNC: 79 U/L (ref 39–117)
ALT SERPL W P-5'-P-CCNC: 16 U/L (ref 1–33)
ANION GAP SERPL CALCULATED.3IONS-SCNC: 11.7 MMOL/L (ref 5–15)
APAP SERPL-MCNC: <5 MCG/ML (ref 0–30)
AST SERPL-CCNC: 14 U/L (ref 1–32)
BASOPHILS # BLD AUTO: 0.02 10*3/MM3 (ref 0–0.2)
BASOPHILS NFR BLD AUTO: 0.3 % (ref 0–1.5)
BILIRUB SERPL-MCNC: 0.2 MG/DL (ref 0–1.2)
BUN SERPL-MCNC: 8 MG/DL (ref 6–20)
BUN/CREAT SERPL: 9.4 (ref 7–25)
CALCIUM SPEC-SCNC: 9.2 MG/DL (ref 8.6–10.5)
CHLORIDE SERPL-SCNC: 109 MMOL/L (ref 98–107)
CO2 SERPL-SCNC: 21.3 MMOL/L (ref 22–29)
CREAT SERPL-MCNC: 0.85 MG/DL (ref 0.57–1)
DEPRECATED RDW RBC AUTO: 42.3 FL (ref 37–54)
EOSINOPHIL # BLD AUTO: 0 10*3/MM3 (ref 0–0.4)
EOSINOPHIL NFR BLD AUTO: 0 % (ref 0.3–6.2)
ERYTHROCYTE [DISTWIDTH] IN BLOOD BY AUTOMATED COUNT: 14.4 % (ref 12.3–15.4)
GFR SERPL CREATININE-BSD FRML MDRD: 74 ML/MIN/1.73
GLOBULIN UR ELPH-MCNC: 2.8 GM/DL
GLUCOSE SERPL-MCNC: 219 MG/DL (ref 65–99)
HCT VFR BLD AUTO: 42.3 % (ref 34–46.6)
HGB BLD-MCNC: 14 G/DL (ref 12–15.9)
IMM GRANULOCYTES # BLD AUTO: 0.03 10*3/MM3 (ref 0–0.05)
IMM GRANULOCYTES NFR BLD AUTO: 0.5 % (ref 0–0.5)
LYMPHOCYTES # BLD AUTO: 0.5 10*3/MM3 (ref 0.7–3.1)
LYMPHOCYTES NFR BLD AUTO: 7.7 % (ref 19.6–45.3)
MCH RBC QN AUTO: 27 PG (ref 26.6–33)
MCHC RBC AUTO-ENTMCNC: 33.1 G/DL (ref 31.5–35.7)
MCV RBC AUTO: 81.7 FL (ref 79–97)
MONOCYTES # BLD AUTO: 0.08 10*3/MM3 (ref 0.1–0.9)
MONOCYTES NFR BLD AUTO: 1.2 % (ref 5–12)
NEUTROPHILS NFR BLD AUTO: 5.83 10*3/MM3 (ref 1.7–7)
NEUTROPHILS NFR BLD AUTO: 90.3 % (ref 42.7–76)
NRBC BLD AUTO-RTO: 0 /100 WBC (ref 0–0.2)
PLATELET # BLD AUTO: 363 10*3/MM3 (ref 140–450)
PMV BLD AUTO: 10.2 FL (ref 6–12)
POTASSIUM SERPL-SCNC: 4.3 MMOL/L (ref 3.5–5.2)
PROT SERPL-MCNC: 7.2 G/DL (ref 6–8.5)
RBC # BLD AUTO: 5.18 10*6/MM3 (ref 3.77–5.28)
SODIUM SERPL-SCNC: 142 MMOL/L (ref 136–145)
T-UPTAKE NFR SERPL: 1.1 TBI (ref 0.8–1.3)
T4 SERPL-MCNC: 6.66 MCG/DL (ref 4.5–11.7)
TSH SERPL DL<=0.05 MIU/L-ACNC: 1.11 UIU/ML (ref 0.27–4.2)
WBC # BLD AUTO: 6.46 10*3/MM3 (ref 3.4–10.8)

## 2021-06-10 PROCEDURE — 85652 RBC SED RATE AUTOMATED: CPT

## 2021-06-10 PROCEDURE — 80143 DRUG ASSAY ACETAMINOPHEN: CPT

## 2021-06-10 PROCEDURE — 84482 T3 REVERSE: CPT

## 2021-06-10 PROCEDURE — 80053 COMPREHEN METABOLIC PANEL: CPT

## 2021-06-10 PROCEDURE — 84479 ASSAY OF THYROID (T3 OR T4): CPT

## 2021-06-10 PROCEDURE — 36415 COLL VENOUS BLD VENIPUNCTURE: CPT

## 2021-06-10 PROCEDURE — 86140 C-REACTIVE PROTEIN: CPT

## 2021-06-10 PROCEDURE — 84443 ASSAY THYROID STIM HORMONE: CPT

## 2021-06-10 PROCEDURE — 84436 ASSAY OF TOTAL THYROXINE: CPT

## 2021-06-10 PROCEDURE — 85025 COMPLETE CBC W/AUTO DIFF WBC: CPT

## 2021-06-10 PROCEDURE — 82164 ANGIOTENSIN I ENZYME TEST: CPT

## 2021-06-11 LAB
ACE SERPL-CCNC: 54 U/L (ref 14–82)
CRP SERPL-MCNC: <0.3 MG/DL (ref 0–0.5)
ERYTHROCYTE [SEDIMENTATION RATE] IN BLOOD: 19 MM/HR (ref 0–20)

## 2021-06-14 LAB — T3REVERSE SERPL-MCNC: 18.7 NG/DL (ref 9.2–24.1)

## 2021-06-15 ENCOUNTER — APPOINTMENT (OUTPATIENT)
Dept: GENERAL RADIOLOGY | Facility: HOSPITAL | Age: 41
End: 2021-06-15

## 2021-06-15 ENCOUNTER — APPOINTMENT (OUTPATIENT)
Dept: CT IMAGING | Facility: HOSPITAL | Age: 41
End: 2021-06-15

## 2021-06-15 ENCOUNTER — HOSPITAL ENCOUNTER (OUTPATIENT)
Facility: HOSPITAL | Age: 41
Setting detail: OBSERVATION
Discharge: HOME | End: 2021-06-16
Attending: EMERGENCY MEDICINE | Admitting: INTERNAL MEDICINE

## 2021-06-15 DIAGNOSIS — R07.9 CHEST PAIN, UNSPECIFIED TYPE: Primary | ICD-10-CM

## 2021-06-15 DIAGNOSIS — R06.02 SHORTNESS OF BREATH: ICD-10-CM

## 2021-06-15 PROBLEM — F41.9 ANXIETY DISORDER: Chronic | Status: ACTIVE | Noted: 2021-06-15

## 2021-06-15 PROBLEM — M79.7 FIBROMYALGIA: Chronic | Status: ACTIVE | Noted: 2021-06-15

## 2021-06-15 PROBLEM — R07.89 CHEST PAIN, ATYPICAL: Status: ACTIVE | Noted: 2021-06-15

## 2021-06-15 PROBLEM — J45.40 MODERATE PERSISTENT ASTHMA WITHOUT COMPLICATION: Chronic | Status: ACTIVE | Noted: 2021-06-15

## 2021-06-15 LAB
ALBUMIN SERPL-MCNC: 4.4 G/DL (ref 3.5–5.2)
ALBUMIN/GLOB SERPL: 1.7 G/DL
ALP SERPL-CCNC: 93 U/L (ref 39–117)
ALT SERPL W P-5'-P-CCNC: 16 U/L (ref 1–33)
ANION GAP SERPL CALCULATED.3IONS-SCNC: 10 MMOL/L (ref 5–15)
AST SERPL-CCNC: 18 U/L (ref 1–32)
BASOPHILS # BLD AUTO: 0.08 10*3/MM3 (ref 0–0.2)
BASOPHILS NFR BLD AUTO: 1 % (ref 0–1.5)
BILIRUB SERPL-MCNC: 0.3 MG/DL (ref 0–1.2)
BUN SERPL-MCNC: 9 MG/DL (ref 6–20)
BUN/CREAT SERPL: 10.2 (ref 7–25)
CALCIUM SPEC-SCNC: 9.5 MG/DL (ref 8.6–10.5)
CHLORIDE SERPL-SCNC: 101 MMOL/L (ref 98–107)
CO2 SERPL-SCNC: 28 MMOL/L (ref 22–29)
CREAT SERPL-MCNC: 0.88 MG/DL (ref 0.57–1)
D DIMER PPP FEU-MCNC: 0.39 MCGFEU/ML (ref 0–0.57)
DEPRECATED RDW RBC AUTO: 39.9 FL (ref 37–54)
EOSINOPHIL # BLD AUTO: 0.87 10*3/MM3 (ref 0–0.4)
EOSINOPHIL NFR BLD AUTO: 11 % (ref 0.3–6.2)
ERYTHROCYTE [DISTWIDTH] IN BLOOD BY AUTOMATED COUNT: 13.9 % (ref 12.3–15.4)
GFR SERPL CREATININE-BSD FRML MDRD: 71 ML/MIN/1.73
GLOBULIN UR ELPH-MCNC: 2.6 GM/DL
GLUCOSE SERPL-MCNC: 132 MG/DL (ref 65–99)
HCG SERPL QL: NEGATIVE
HCT VFR BLD AUTO: 41.7 % (ref 34–46.6)
HGB BLD-MCNC: 14.2 G/DL (ref 12–15.9)
HOLD SPECIMEN: NORMAL
IMM GRANULOCYTES # BLD AUTO: 0.02 10*3/MM3 (ref 0–0.05)
IMM GRANULOCYTES NFR BLD AUTO: 0.3 % (ref 0–0.5)
LIPASE SERPL-CCNC: 11 U/L (ref 13–60)
LYMPHOCYTES # BLD AUTO: 1.73 10*3/MM3 (ref 0.7–3.1)
LYMPHOCYTES NFR BLD AUTO: 21.9 % (ref 19.6–45.3)
MAGNESIUM SERPL-MCNC: 2.2 MG/DL (ref 1.6–2.6)
MCH RBC QN AUTO: 26.9 PG (ref 26.6–33)
MCHC RBC AUTO-ENTMCNC: 34.1 G/DL (ref 31.5–35.7)
MCV RBC AUTO: 79.1 FL (ref 79–97)
MONOCYTES # BLD AUTO: 0.45 10*3/MM3 (ref 0.1–0.9)
MONOCYTES NFR BLD AUTO: 5.7 % (ref 5–12)
NEUTROPHILS NFR BLD AUTO: 4.75 10*3/MM3 (ref 1.7–7)
NEUTROPHILS NFR BLD AUTO: 60.1 % (ref 42.7–76)
NRBC BLD AUTO-RTO: 0 /100 WBC (ref 0–0.2)
NT-PROBNP SERPL-MCNC: 55.6 PG/ML (ref 0–450)
PLATELET # BLD AUTO: 383 10*3/MM3 (ref 140–450)
PMV BLD AUTO: 9.2 FL (ref 6–12)
POTASSIUM SERPL-SCNC: 3.6 MMOL/L (ref 3.5–5.2)
PROT SERPL-MCNC: 7 G/DL (ref 6–8.5)
RBC # BLD AUTO: 5.27 10*6/MM3 (ref 3.77–5.28)
SARS-COV-2 RNA PNL SPEC NAA+PROBE: NOT DETECTED
SODIUM SERPL-SCNC: 139 MMOL/L (ref 136–145)
THEOPHYLLINE SERPL-MCNC: 20.2 MCG/ML (ref 10–20)
TROPONIN T SERPL-MCNC: <0.01 NG/ML (ref 0–0.03)
TROPONIN T SERPL-MCNC: <0.01 NG/ML (ref 0–0.03)
WBC # BLD AUTO: 7.9 10*3/MM3 (ref 3.4–10.8)
WHOLE BLOOD HOLD SPECIMEN: NORMAL

## 2021-06-15 PROCEDURE — 80198 ASSAY OF THEOPHYLLINE: CPT | Performed by: EMERGENCY MEDICINE

## 2021-06-15 PROCEDURE — G0378 HOSPITAL OBSERVATION PER HR: HCPCS

## 2021-06-15 PROCEDURE — 85379 FIBRIN DEGRADATION QUANT: CPT | Performed by: EMERGENCY MEDICINE

## 2021-06-15 PROCEDURE — 84484 ASSAY OF TROPONIN QUANT: CPT | Performed by: EMERGENCY MEDICINE

## 2021-06-15 PROCEDURE — 71275 CT ANGIOGRAPHY CHEST: CPT

## 2021-06-15 PROCEDURE — 96374 THER/PROPH/DIAG INJ IV PUSH: CPT

## 2021-06-15 PROCEDURE — 71045 X-RAY EXAM CHEST 1 VIEW: CPT

## 2021-06-15 PROCEDURE — 93005 ELECTROCARDIOGRAM TRACING: CPT

## 2021-06-15 PROCEDURE — 94640 AIRWAY INHALATION TREATMENT: CPT

## 2021-06-15 PROCEDURE — 83735 ASSAY OF MAGNESIUM: CPT | Performed by: EMERGENCY MEDICINE

## 2021-06-15 PROCEDURE — 99284 EMERGENCY DEPT VISIT MOD MDM: CPT

## 2021-06-15 PROCEDURE — 99219 PR INITIAL OBSERVATION CARE/DAY 50 MINUTES: CPT | Performed by: FAMILY MEDICINE

## 2021-06-15 PROCEDURE — 85025 COMPLETE CBC W/AUTO DIFF WBC: CPT

## 2021-06-15 PROCEDURE — 80053 COMPREHEN METABOLIC PANEL: CPT

## 2021-06-15 PROCEDURE — 84703 CHORIONIC GONADOTROPIN ASSAY: CPT

## 2021-06-15 PROCEDURE — C9803 HOPD COVID-19 SPEC COLLECT: HCPCS

## 2021-06-15 PROCEDURE — 96372 THER/PROPH/DIAG INJ SC/IM: CPT

## 2021-06-15 PROCEDURE — 25010000002 METHYLPREDNISOLONE PER 125 MG: Performed by: FAMILY MEDICINE

## 2021-06-15 PROCEDURE — 87635 SARS-COV-2 COVID-19 AMP PRB: CPT | Performed by: EMERGENCY MEDICINE

## 2021-06-15 PROCEDURE — 83880 ASSAY OF NATRIURETIC PEPTIDE: CPT | Performed by: EMERGENCY MEDICINE

## 2021-06-15 PROCEDURE — 83690 ASSAY OF LIPASE: CPT | Performed by: EMERGENCY MEDICINE

## 2021-06-15 PROCEDURE — 25010000002 ENOXAPARIN PER 10 MG: Performed by: FAMILY MEDICINE

## 2021-06-15 PROCEDURE — 84484 ASSAY OF TROPONIN QUANT: CPT

## 2021-06-15 PROCEDURE — 96361 HYDRATE IV INFUSION ADD-ON: CPT

## 2021-06-15 PROCEDURE — 25010000002 IOPAMIDOL 61 % SOLUTION: Performed by: EMERGENCY MEDICINE

## 2021-06-15 RX ORDER — NITROGLYCERIN 0.4 MG/1
0.4 TABLET SUBLINGUAL
Status: DISCONTINUED | OUTPATIENT
Start: 2021-06-15 | End: 2021-06-16 | Stop reason: HOSPADM

## 2021-06-15 RX ORDER — ALBUTEROL SULFATE 2.5 MG/3ML
2.5 SOLUTION RESPIRATORY (INHALATION) EVERY 6 HOURS PRN
Status: DISCONTINUED | OUTPATIENT
Start: 2021-06-15 | End: 2021-06-16 | Stop reason: HOSPADM

## 2021-06-15 RX ORDER — SODIUM CHLORIDE 0.9 % (FLUSH) 0.9 %
10 SYRINGE (ML) INJECTION EVERY 12 HOURS SCHEDULED
Status: DISCONTINUED | OUTPATIENT
Start: 2021-06-15 | End: 2021-06-16 | Stop reason: HOSPADM

## 2021-06-15 RX ORDER — ONDANSETRON 4 MG/1
4 TABLET, FILM COATED ORAL EVERY 6 HOURS PRN
Status: DISCONTINUED | OUTPATIENT
Start: 2021-06-15 | End: 2021-06-16 | Stop reason: HOSPADM

## 2021-06-15 RX ORDER — CLONAZEPAM 0.5 MG/1
1 TABLET ORAL 2 TIMES DAILY PRN
Status: DISCONTINUED | OUTPATIENT
Start: 2021-06-15 | End: 2021-06-16 | Stop reason: HOSPADM

## 2021-06-15 RX ORDER — METHYLPREDNISOLONE SODIUM SUCCINATE 125 MG/2ML
60 INJECTION, POWDER, LYOPHILIZED, FOR SOLUTION INTRAMUSCULAR; INTRAVENOUS ONCE
Status: COMPLETED | OUTPATIENT
Start: 2021-06-15 | End: 2021-06-15

## 2021-06-15 RX ORDER — SODIUM CHLORIDE 0.9 % (FLUSH) 0.9 %
10 SYRINGE (ML) INJECTION AS NEEDED
Status: DISCONTINUED | OUTPATIENT
Start: 2021-06-15 | End: 2021-06-16 | Stop reason: HOSPADM

## 2021-06-15 RX ORDER — ONDANSETRON 2 MG/ML
4 INJECTION INTRAMUSCULAR; INTRAVENOUS EVERY 6 HOURS PRN
Status: DISCONTINUED | OUTPATIENT
Start: 2021-06-15 | End: 2021-06-16 | Stop reason: HOSPADM

## 2021-06-15 RX ORDER — BUDESONIDE 0.5 MG/2ML
0.5 INHALANT ORAL
Status: DISCONTINUED | OUTPATIENT
Start: 2021-06-15 | End: 2021-06-16 | Stop reason: HOSPADM

## 2021-06-15 RX ORDER — PANTOPRAZOLE SODIUM 40 MG/1
40 TABLET, DELAYED RELEASE ORAL NIGHTLY
Status: DISCONTINUED | OUTPATIENT
Start: 2021-06-15 | End: 2021-06-16 | Stop reason: HOSPADM

## 2021-06-15 RX ORDER — HYDROCODONE BITARTRATE AND ACETAMINOPHEN 7.5; 325 MG/1; MG/1
1 TABLET ORAL EVERY 6 HOURS PRN
Status: DISCONTINUED | OUTPATIENT
Start: 2021-06-15 | End: 2021-06-16 | Stop reason: HOSPADM

## 2021-06-15 RX ORDER — SODIUM CHLORIDE 9 MG/ML
50 INJECTION, SOLUTION INTRAVENOUS CONTINUOUS
Status: DISCONTINUED | OUTPATIENT
Start: 2021-06-15 | End: 2021-06-16 | Stop reason: HOSPADM

## 2021-06-15 RX ORDER — TRAMADOL HYDROCHLORIDE 50 MG/1
50 TABLET ORAL EVERY 6 HOURS PRN
Status: DISCONTINUED | OUTPATIENT
Start: 2021-06-15 | End: 2021-06-16 | Stop reason: HOSPADM

## 2021-06-15 RX ORDER — CHOLECALCIFEROL (VITAMIN D3) 125 MCG
5 CAPSULE ORAL NIGHTLY PRN
Status: DISCONTINUED | OUTPATIENT
Start: 2021-06-15 | End: 2021-06-16 | Stop reason: HOSPADM

## 2021-06-15 RX ADMIN — PANTOPRAZOLE SODIUM 40 MG: 40 TABLET, DELAYED RELEASE ORAL at 20:14

## 2021-06-15 RX ADMIN — SODIUM CHLORIDE 50 ML/HR: 9 INJECTION, SOLUTION INTRAVENOUS at 20:42

## 2021-06-15 RX ADMIN — IOPAMIDOL 100 ML: 612 INJECTION, SOLUTION INTRAVENOUS at 16:58

## 2021-06-15 RX ADMIN — BUDESONIDE 0.5 MG: 0.5 INHALANT RESPIRATORY (INHALATION) at 19:54

## 2021-06-15 RX ADMIN — SODIUM CHLORIDE 1000 ML: 9 INJECTION, SOLUTION INTRAVENOUS at 18:22

## 2021-06-15 RX ADMIN — METHYLPREDNISOLONE SODIUM SUCCINATE 60 MG: 125 INJECTION, POWDER, FOR SOLUTION INTRAMUSCULAR; INTRAVENOUS at 20:52

## 2021-06-15 RX ADMIN — SODIUM CHLORIDE, PRESERVATIVE FREE 10 ML: 5 INJECTION INTRAVENOUS at 20:43

## 2021-06-15 RX ADMIN — ENOXAPARIN SODIUM 40 MG: 40 INJECTION SUBCUTANEOUS at 20:16

## 2021-06-15 RX ADMIN — Medication 5 MG: at 20:43

## 2021-06-15 RX ADMIN — CLONAZEPAM 1 MG: 0.5 TABLET ORAL at 20:43

## 2021-06-15 RX ADMIN — THEOPHYLLINE ANHYDROUS 500 MG: 100 CAPSULE, EXTENDED RELEASE ORAL at 20:14

## 2021-06-15 NOTE — ED PROVIDER NOTES
"Subjective   History of Present Illness     40-year-old female presents ER complaining of chest heaviness x4 days.  Worse today.  Feels like something is smothering her.  Went to her physician's office today and EMS had to be called because she was feeling much worse.  They reportedly placed her on oxygen and she started feeling better.  She was also given 324 mg of aspirin by EMS.  She is noted to have a history of panic attacks, GERD, asthma.  Her symptoms are moderate.  She reports a nonproductive cough.  Denies fever chills.  Denies ill contacts.  Reports she had her Covid vaccination previously.  No other associated signs or symptoms or modifying factors    Review of Systems   Constitutional: Negative for chills and fever.   Respiratory: Positive for cough, chest tightness and shortness of breath.    Cardiovascular: Positive for chest pain. Negative for leg swelling.   Gastrointestinal: Negative for abdominal pain and diarrhea.   Genitourinary: Negative.    Skin: Negative for rash.   Neurological: Negative for headaches.   All other systems reviewed and are negative.      Past Medical History:   Diagnosis Date   • Arthritis    • Asthma    • Depression    • Dysphagia     REPORTS \"IT'S LIKE THINGS WON'T GO DOWN SOMETIMES\"   • Fibromyalgia    • GERD (gastroesophageal reflux disease)     PATIENT REPORTS HAD ER VISIT IN DECEMBER OF 2016 FOR RIGHT SIDED PAIN.  PATIENT THOUGHT MAY BE HEART RELATED BUT REPORTS WAS TOLD ONLY REFLUX.   • History of bronchitis    • History of kidney stones     REPORTS PASSED WITHOUT SURGICAL INTERVENTION IN SUMMER OF 2017   • History of panic attacks    • History of pneumonia    • Insomnia    • MRSA (methicillin resistant Staphylococcus aureus) 2008    REPORTS IN SINUS CAVITY AND THAT SHE WAS TREATED   • Neck pain    • TIA (transient ischemic attack)     REPORTS HX OF TIA 8 YEARS AGO AND THAT SHE WAS TAKEN OFF OF BCP.  NO RESIDUAL NOTED   • Wears glasses     FOR READING       Allergies "   Allergen Reactions   • Sulfa Antibiotics Other (See Comments)     REPORTS CAUSED HAND TREMORS AND HEART PALPITATIONS         Past Surgical History:   Procedure Laterality Date   • BREAST AUGMENTATION     • COLONOSCOPY     • DILATION AND CURETTAGE, DIAGNOSTIC / THERAPEUTIC     • ENDOSCOPY     • INGUINAL HERNIA REPAIR Right 11/20/2017    Procedure: REPAIR OF INCARCERATED RIGHT FEMORAL HERNIA, PRIMARY REPAIR OF BLADDER;  Surgeon: Jennifer Torrez MD;  Location: [facility];  Service:    • SINUS SURGERY     • WISDOM TOOTH EXTRACTION         Family History   Problem Relation Age of Onset   • Hypertension Father    • Cancer Maternal Aunt    • Diabetes Paternal Uncle        Social History     Socioeconomic History   • Marital status:      Spouse name: Not on file   • Number of children: Not on file   • Years of education: Not on file   • Highest education level: Not on file   Tobacco Use   • Smoking status: Never Smoker   • Smokeless tobacco: Never Used   Substance and Sexual Activity   • Alcohol use: Yes     Comment: SOCIAL USE ONLY, NO HX OF ABUSE REPORTED   • Drug use: No   • Sexual activity: Defer           Objective   Physical Exam  Vitals and nursing note reviewed.   Constitutional:       General: She is not in acute distress.     Appearance: She is well-developed.   HENT:      Head: Normocephalic and atraumatic.   Cardiovascular:      Rate and Rhythm: Tachycardia present.      Heart sounds: Normal heart sounds.   Pulmonary:      Effort: Pulmonary effort is normal.      Breath sounds: Normal breath sounds.   Chest:      Chest wall: No tenderness.   Abdominal:      Palpations: Abdomen is soft.      Tenderness: There is no abdominal tenderness. There is no guarding.   Musculoskeletal:      Cervical back: Neck supple.      Right lower leg: No tenderness. No edema.      Left lower leg: No tenderness. No edema.   Skin:     General: Skin is warm and dry.      Capillary Refill: Capillary refill takes less than 2  seconds.   Neurological:      General: No focal deficit present.      Mental Status: She is alert.   Psychiatric:         Mood and Affect: Mood is anxious.         Procedures           ED Course  ED Course as of Preston 15 1811   Tue Preston 15, 2021   1406 Temp: 98.2 °F (36.8 °C) [CS]   1406 Heart Rate: 105 [CS]   1406 Resp: 18 [CS]   1406 SpO2: 97 % [CS]   1406 Not hypoxic   Device (Oxygen Therapy): room air [CS]   1414 Time 1405.  Rate 106.  Sinus tachycardia.  Normal axis.  Nonspecific ST and T wave abnormality    [CS]   1414 ECG 12 Lead [CS]   1519 Narrative & Impression  PROCEDURE: XR CHEST 1 VW-     HISTORY: Chest Pain Triage Protocol     COMPARISON: November 13, 2017.     FINDINGS: The heart is normal in size. The mediastinum is unremarkable.  The lungs are clear. There is no pneumothorax.  There are no acute  osseous abnormalities.     IMPRESSION:  No acute cardiopulmonary process.     Continued followup is recommended.     This report was finalized on 6/15/2021 3:11 PM by Yahir Pool M.D..          [CS]   1546 Theophylline Level [CS]   1740 FINAL REPORT     TECHNIQUE:  Multiple axial CT images were obtained through the chest  following IV contrast using a CTA/PE protocol.  3D/MIP  reconstruction images were also performed. This study was  performed with techniques to keep radiation doses as low as  reasonably achievable (ALARA). Individualized dose reduction  techniques using automated exposure control or adjustment of mA  and/or kV according to the patient's size were employed.     CLINICAL HISTORY:  CP, soa, abnl EKG  pe protocol     FINDINGS:  PAs and aorta: No pulmonary embolus.  Thoracic aorta is  unremarkable.  No significant coronary artery calcifications.  Heart/mediastinum: No evidence for right heart strain.  No  pericardial effusion.  The heart is normal in size.  Lungs: Mild  atelectasis.  Otherwise the lungs are clear.  Lymph nodes: No  pathologically enlarged thoracic lymph nodes.  Pleura:  No  pneumothorax or pleural effusion.  Chest Wall: No chest wall  contusion.  Bones: No acute fracture.  Upper abdomen: No acute  findings in the upper abdomen.     IMPRESSION:  No pulmonary embolus.  No acute findings in the chest.     Authenticated by Marlon Parish MD on 06/15/2021 05:39:26 PM    [CS]   1810 Troponin   06/15 1722    Troponin T <0.010  BNP   06/15 1409    proBNP 55.6  Lipase   06/15 1409    Lipase 11    CBC & Differential   06/15 1409    WBC 7.90  RBC 5.27  Hemoglobin 14.2  Hematocrit 41.7  MCV 79.1  MCH 26.9  MCHC 34.1  RDW 13.9  RDW-SD 39.9  MPV 9.2  Platelets 383  Neutrophil Rel % 60.1  Lymphocyte Rel % 21.9  Monocyte Rel % 5.7  Eosinophil Rel % 11.0  Basophil Rel % 1.0  Immature Granulocyte Rel % 0.3  Neutrophils Absolute 4.75  Lymphocytes Absolute 1.73  Monocytes Absolute 0.45  Eosinophils Absolute 0.87  Basophils Absolute 0.08  Immature Grans, Absolute 0.02  nRBC 0.0     COVID PRE-OP / PRE-PROCEDURE SCREENING ORDER (NO ISOLATION) - Swab, Nasal Cavity   06/15 1546    COVID19 Not Detected  hCG, Serum, Qualitative   06/15 1409    HCG Qualitative Negative  Comprehensive Metabolic Panel   06/15 1409    Glucose 132  BUN 9  Creatinine 0.88  Sodium 139  Potassium 3.6  Chloride 101  CO2 28.0  Calcium 9.5  Total Protein 7.0  Albumin 4.40  ALT (SGPT) 16  AST (SGOT) 18  Alkaline Phosphatase 93  Total Bilirubin 0.3  eGFR Non  Am 71  Globulin 2.6  A/G Ratio 1.7  BUN/Creatinine Ratio 10.2  Anion Gap 10.0     D-dimer, Quantitative   06/15 1438    D-Dimer, Quant 0.39  Theophylline Level   06/15 1409    Theophylline Level 20.2    Magnesium   06/15 1409    Magnesium 2.2          [CS]      ED Course User Index  [CS] Lokesh Morales MD                                           MDM     Uncertain etiology of her presentation.  D-dimer negative.  CT negative for PE.  Additionally no coronary calcifications seen on CT.  EKG does show some minimal ST depression in the anterolateral leads.  There was  some suggestion of this on a previous EKG from 2016.  Her theophylline level is minimally elevated.  Perhaps she has more sensitive to this and it could be contributing to her symptoms.  Troponins negative x2.  Covid negative.  BNP normal.  States she still has difficulty breathing and chest pressure once she sits up but is better when supine.  Patient does not feel like the symptoms are characteristic of her asthma or anxiety issues.  Discussed the case with cardiology Dr. Boykin.  He doubts cardiac etiology.  Discussed the case with the hospitalist Dr. Son.  He will admit for observation.    Final diagnoses:   Chest pain, unspecified type   Shortness of breath       ED Disposition  ED Disposition     ED Disposition Condition Comment    Decision to Admit  Level of Care: Remote Telemetry [26]   Diagnosis: Chest pain, unspecified type [[id number]]   Admitting Physician: TORIBIO SON [[id number]]            No follow-up provider specified.       Medication List      No changes were made to your prescriptions during this visit.          Lokesh Morales MD  06/15/21 1414       Lokesh Morales MD  06/15/21 1537       Lokesh Morales MD  06/15/21 1811

## 2021-06-15 NOTE — H&P
[facility]   HISTORY AND PHYSICAL      Name:  Marlin Silav   Age:  40 y.o.  Sex:  female  :  1980  MRN:  [id number]   Visit Number:  [id number]  Admission Date:  6/15/2021  Date Of Service:  06/15/21  Primary Care Physician:  Kris Melton MD    Chief Complaint:     Shortness of breath, fatigue, weakness    History Of Presenting Illness:      Patient is a 40-year-old female with history of asthma, anxiety, fibromyalgia, GERD, depression, arthritis, who had presented to the emergency room with complaints of chest heaviness, shortness of breath, and generalized weakness.  She states symptoms have been going on for the last 3 days.  She states she feels very fatigued, more short of breath when sitting up and with exertion.  She states she went by her PCPs office today who recommended she be evaluated in the emergency room.  She denies any prior history of cardiac disease.  Admits to a longstanding history of asthma for which she is managed with Flovent, albuterol, theophylline.  Denies any fevers or chills.  No known sick contacts.  She lives at home with her 3 children.  Denies any illicit drug use, tobacco abuse.  Occasional alcohol use.  Denies any prior cardiovascular work-up.  Admits to family history of congestive heart failure and heart attacks.  She denies any specific chest pain at this time.  She notes she was treated for a sinus like infection recently.    In the ER, patient's vital signs were overall stable other than mildly elevated heart rate in the 105 range.  She is not hypoxic.  She has had 2 - troponins.  A normal D-dimer.  Creatinine is 0.88 with a potassium of 3.6.  White count of 7900 with hemoglobin of 14 and platelets of 383.  Magnesium 2.2.  Theophylline level mildly elevated at 20.2.  A proBNP of 55 and a negative CRP.  A CT PE protocol demonstrated no evidence of pulmonary emboli, pneumothorax, pleural effusion or pneumonia.  Chest x-ray was  without acute cardiopulmonary process.  ER physician requested observation for cardiovascular work-up.  He also discussed the case with cardiology.  Will admit for observation.    Review Of Systems:    All systems were reviewed and negative except for: Shortness of breath    Past Medical History: Patient  has a past medical history of Arthritis, Asthma, Depression, Dysphagia, Fibromyalgia, GERD (gastroesophageal reflux disease), History of bronchitis, History of kidney stones, History of panic attacks, History of pneumonia, Insomnia, MRSA (methicillin resistant Staphylococcus aureus) (2008), Neck pain, TIA (transient ischemic attack), and Wears glasses.    Past Surgical History: Patient  has a past surgical history that includes Sinus surgery; Breast Augmentation; Dilation and curettage, diagnostic / therapeutic; Esophagogastroduodenoscopy; Colonoscopy; Conrad tooth extraction; and Inguinal Hernia Repair (Right, 11/20/2017).    Social History: Patient  reports that she has never smoked. She has never used smokeless tobacco. She reports current alcohol use. She reports that she does not use drugs.    Family History: Patient's family history includes Cancer in her maternal aunt; Diabetes in her paternal uncle; Hypertension in her father.    Allergies:      Sulfa antibiotics    Home Medications:    Prior to Admission Medications     Prescriptions Last Dose Informant Patient Reported? Taking?    METOLAZONE PO   Yes Yes    Take  by mouth.    albuterol (PROVENTIL HFA;VENTOLIN HFA) 108 (90 Base) MCG/ACT inhaler  Self Yes No    Inhale 2 puffs Every 4 (Four) Hours As Needed for Wheezing.    albuterol (PROVENTIL) (5 MG/ML) 0.5% nebulizer solution  Self Yes No    Take 2.5 mg by nebulization Every 6 (Six) Hours As Needed for Wheezing.    clonazePAM (KLONOPIN) 1 MG tablet  Self Yes No    Take 1 mg by mouth 2 (Two) Times a Day As Needed for Anxiety or Seizures. REPORTS SHE TAKES 2 OF THE 0.5MG TABLETS (FOR TOTAL DOSE OF 1MG) AT  NIGHT    fluticasone (FLOVENT HFA) 220 MCG/ACT inhaler  Self Yes No    Inhale 1 puff Every Night.    HYDROcodone-acetaminophen (NORCO) 7.5-325 MG per tablet   Yes No    Take 1 tablet by mouth Every 6 (Six) Hours As Needed for Moderate Pain .    Loratadine-Pseudoephedrine (CLARITIN-D 24 HOUR PO)  Self Yes No    Take 1 tablet by mouth Daily.    melatonin 5 MG sublingual tablet sublingual tablet  Self Yes No    Place 10 mg under the tongue Every Night.    nabumetone (RELAFEN) 500 MG tablet   No No    Take 1 tablet by mouth 2 (Two) Times a Day As Needed for Moderate Pain  (neck pain).    pantoprazole (PROTONIX) 40 MG EC tablet   Yes No    Take 40 mg by mouth Every Night.    Probiotic Product (PROBIOTIC ADVANCED PO)  Self Yes No    Take 1 tablet by mouth Daily.    theophylline (UNIPHYL) 600 MG 24 hr tablet  Self Yes No    Take 600 mg by mouth Daily.    traMADol (ULTRAM) 50 MG tablet  Self Yes No    Take 50 mg by mouth Every 6 (Six) Hours As Needed for Moderate Pain .        ED Medications:    Medications   sodium chloride 0.9 % flush 10 mL (has no administration in time range)   sodium chloride 0.9 % bolus 1,000 mL (1,000 mL Intravenous New Bag 6/15/21 1822)   iopamidol (ISOVUE-300) 61 % injection 100 mL (100 mL Intravenous Given 6/15/21 1658)     Vital Signs:  Temp:  [98.2 °F (36.8 °C)] 98.2 °F (36.8 °C)  Heart Rate:  [] 84  Resp:  [17-18] 17  BP: (129-153)/(69-98) 136/70        06/15/21  1405   Weight: 72.6 kg (160 lb)     Body mass index is 28.34 kg/m².    Physical Exam:     General Appearance:  Alert and cooperative.  In no acute distress.   Head:  Atraumatic and normocephalic.   Eyes: Conjunctivae and sclerae normal, no icterus. No pallor.   Ears:  Ears with no abnormalities noted.   Throat: No oral lesions, no thrush, oral mucosa slightly dry   Neck: Supple, trachea midline, no thyromegaly.   Back:   No kyphoscoliosis present. No tenderness to palpation.   Lungs:   Breath sounds heard bilaterally equally.  No  crackles or wheezing. No Pleural rub or bronchial breathing.  Nonlabored   Heart:  Normal S1 and S2, no murmur, no gallop, no rub. No JVD.   Abdomen:   Normal bowel sounds, no masses, no organomegaly. Soft, nontender, nondistended, no rebound tenderness.   Extremities: Supple, no edema, no cyanosis, no clubbing.   Pulses: Pulses palpable bilaterally.   Skin: No bleeding or rash.   Neurologic: Alert and oriented x 3. No facial asymmetry. Moves all four limbs. No tremors.      Laboratory data:    I have reviewed the labs done in the emergency room.    Results from last 7 days   Lab Units 06/15/21  1409 06/10/21  1230   SODIUM mmol/L 139 142   POTASSIUM mmol/L 3.6 4.3   CHLORIDE mmol/L 101 109*   CO2 mmol/L 28.0 21.3*   BUN mg/dL 9 8   CREATININE mg/dL 0.88 0.85   CALCIUM mg/dL 9.5 9.2   BILIRUBIN mg/dL 0.3 0.2   ALK PHOS U/L 93 79   ALT (SGPT) U/L 16 16   AST (SGOT) U/L 18 14   GLUCOSE mg/dL 132* 219*     Results from last 7 days   Lab Units 06/15/21  1409 06/10/21  1230   WBC 10*3/mm3 7.90 6.46   HEMOGLOBIN g/dL 14.2 14.0   HEMATOCRIT % 41.7 42.3   PLATELETS 10*3/mm3 383 363         Results from last 7 days   Lab Units 06/15/21  1722 06/15/21  1409   TROPONIN T ng/mL <0.010 <0.010     Results from last 7 days   Lab Units 06/15/21  1409   PROBNP pg/mL 55.6         Results from last 7 days   Lab Units 06/15/21  1409   LIPASE U/L 11*               Invalid input(s): USDES,  BLOODU, NITRITITE, BACT, EP  Pain Management Panel    There is no flowsheet data to display.         EKG:      Multiple EKGs reviewed.  Today's EKG with a sinus rhythm, there is mild nonspecific ST depression in the anterior lateral leads.  It appears to be somewhat similar, possibly worsened from EKG in 2016.    Radiology:    XR Chest 1 View    Result Date: 6/15/2021  PROCEDURE: XR CHEST 1 VW-  HISTORY: Chest Pain Triage Protocol  COMPARISON: November 13, 2017.  FINDINGS: The heart is normal in size. The mediastinum is unremarkable. The lungs are  clear. There is no pneumothorax.  There are no acute osseous abnormalities.      No acute cardiopulmonary process.  Continued followup is recommended.  This report was finalized on 6/15/2021 3:11 PM by Yahir Pool M.D..    CT Chest Pulmonary Embolism    Result Date: 6/15/2021  FINAL REPORT TECHNIQUE: Multiple axial CT images were obtained through the chest following IV contrast using a CTA/PE protocol.  3D/MIP reconstruction images were also performed. This study was performed with techniques to keep radiation doses as low as reasonably achievable (ALARA). Individualized dose reduction techniques using automated exposure control or adjustment of mA and/or kV according to the patient's size were employed. CLINICAL HISTORY: CP, soa, abnl EKG  pe protocol FINDINGS: PAs and aorta: No pulmonary embolus.  Thoracic aorta is unremarkable.  No significant coronary artery calcifications. Heart/mediastinum: No evidence for right heart strain.  No pericardial effusion.  The heart is normal in size.  Lungs: Mild atelectasis.  Otherwise the lungs are clear.  Lymph nodes: No pathologically enlarged thoracic lymph nodes.  Pleura: No pneumothorax or pleural effusion.  Chest Wall: No chest wall contusion.  Bones: No acute fracture.  Upper abdomen: No acute findings in the upper abdomen.     No pulmonary embolus.  No acute findings in the chest. Authenticated by Marlon Parish MD on 06/15/2021 05:39:26 PM      Assessment:    1.  Atypical chest pain/pressure, present on admission, further work-up  2.  History of asthma with shortness of breath  3.  Fibromyalgia  4.  Anxiety and depression  5.  Arthritis    Plan:    Patient be admitted for observation.  Patient does have risk factors for cardiovascular disease, however current symptomatology and work-up is not consistent with ACS.  CAD, valvular disorder, arrhythmia would be in differential.  Telemetry monitoring and 2D echo for now.  Did discuss with patient decreasing the dose  of her theophylline down to 500 from 600 due to mildly elevated theophylline level on presentation, doubt true toxicity symptoms but possibly contributing.  She does not appear to be having a significant asthma exacerbation, however will will give one-time dose of Solu-Medrol to see if improves her breathing.  She does appear clinically to be dehydrated, gentle fluids given overnight.  Resume home medications once reconciled.    Patient currently meets observation level care with anticipated stay less than 2 midnights.  Further plan of care will be depend upon improvement clinical condition.    Risk Assessment: Moderate  DVT Prophylaxis: Lovenox  Code Status: Full  Diet: Regular    Advance Care Planning   ACP discussion was held with the patient during this visit. Patient does not have an advance directive, declines further assistance.      Genevieve Son DO  06/15/21  18:43 EDT    Dictated utilizing Dragon dictation.

## 2021-06-16 ENCOUNTER — APPOINTMENT (OUTPATIENT)
Dept: CARDIOLOGY | Facility: HOSPITAL | Age: 41
End: 2021-06-16

## 2021-06-16 VITALS
OXYGEN SATURATION: 96 % | WEIGHT: 158.29 LBS | SYSTOLIC BLOOD PRESSURE: 118 MMHG | DIASTOLIC BLOOD PRESSURE: 74 MMHG | BODY MASS INDEX: 28.05 KG/M2 | TEMPERATURE: 97.8 F | HEART RATE: 86 BPM | RESPIRATION RATE: 20 BRPM | HEIGHT: 63 IN

## 2021-06-16 LAB
ANION GAP SERPL CALCULATED.3IONS-SCNC: 10 MMOL/L (ref 5–15)
BACTERIA UR QL AUTO: ABNORMAL /HPF
BH CV ECHO MEAS - % IVS THICK: 76.1 %
BH CV ECHO MEAS - % LVPW THICK: 73 %
BH CV ECHO MEAS - AO MAX PG (FULL): 2.2 MMHG
BH CV ECHO MEAS - AO MAX PG: 5 MMHG
BH CV ECHO MEAS - AO MEAN PG (FULL): 1 MMHG
BH CV ECHO MEAS - AO MEAN PG: 3 MMHG
BH CV ECHO MEAS - AO ROOT AREA (BSA CORRECTED): 1.7
BH CV ECHO MEAS - AO ROOT AREA: 6.7 CM^2
BH CV ECHO MEAS - AO ROOT DIAM: 2.9 CM
BH CV ECHO MEAS - AO V2 MAX: 114 CM/SEC
BH CV ECHO MEAS - AO V2 MEAN: 72.6 CM/SEC
BH CV ECHO MEAS - AO V2 VTI: 18.3 CM
BH CV ECHO MEAS - AVA(I,A): 2.8 CM^2
BH CV ECHO MEAS - AVA(I,D): 2.8 CM^2
BH CV ECHO MEAS - AVA(V,A): 2.2 CM^2
BH CV ECHO MEAS - AVA(V,D): 2.2 CM^2
BH CV ECHO MEAS - BSA(HAYCOCK): 1.8 M^2
BH CV ECHO MEAS - BSA: 1.7 M^2
BH CV ECHO MEAS - BZI_BMI: 28 KILOGRAMS/M^2
BH CV ECHO MEAS - BZI_METRIC_HEIGHT: 160 CM
BH CV ECHO MEAS - BZI_METRIC_WEIGHT: 71.7 KG
BH CV ECHO MEAS - EDV(CUBED): 71.5 ML
BH CV ECHO MEAS - EDV(MOD-SP2): 44.8 ML
BH CV ECHO MEAS - EDV(MOD-SP4): 54.7 ML
BH CV ECHO MEAS - EDV(TEICH): 76.4 ML
BH CV ECHO MEAS - EF(CUBED): 78.1 %
BH CV ECHO MEAS - EF(MOD-BP): 51.1 %
BH CV ECHO MEAS - EF(MOD-SP2): 48.7 %
BH CV ECHO MEAS - EF(MOD-SP4): 52.1 %
BH CV ECHO MEAS - EF(TEICH): 70.8 %
BH CV ECHO MEAS - ESV(CUBED): 15.6 ML
BH CV ECHO MEAS - ESV(MOD-SP2): 23 ML
BH CV ECHO MEAS - ESV(MOD-SP4): 26.2 ML
BH CV ECHO MEAS - ESV(TEICH): 22.3 ML
BH CV ECHO MEAS - FS: 39.8 %
BH CV ECHO MEAS - IVS/LVPW: 0.75
BH CV ECHO MEAS - IVSD: 0.67 CM
BH CV ECHO MEAS - IVSS: 1.2 CM
BH CV ECHO MEAS - LA DIMENSION: 2.8 CM
BH CV ECHO MEAS - LA/AO: 0.98
BH CV ECHO MEAS - LAD MAJOR: 4 CM
BH CV ECHO MEAS - LAT PEAK E' VEL: 11.5 CM/SEC
BH CV ECHO MEAS - LATERAL E/E' RATIO: 5.6
BH CV ECHO MEAS - LV DIASTOLIC VOL/BSA (35-75): 31.3 ML/M^2
BH CV ECHO MEAS - LV MASS(C)D: 96 GRAMS
BH CV ECHO MEAS - LV MASS(C)DI: 54.9 GRAMS/M^2
BH CV ECHO MEAS - LV MASS(C)S: 105.9 GRAMS
BH CV ECHO MEAS - LV MASS(C)SI: 60.6 GRAMS/M^2
BH CV ECHO MEAS - LV MAX PG: 2.8 MMHG
BH CV ECHO MEAS - LV MEAN PG: 2 MMHG
BH CV ECHO MEAS - LV SYSTOLIC VOL/BSA (12-30): 15 ML/M^2
BH CV ECHO MEAS - LV V1 MAX: 83.5 CM/SEC
BH CV ECHO MEAS - LV V1 MEAN: 56.8 CM/SEC
BH CV ECHO MEAS - LV V1 VTI: 16.7 CM
BH CV ECHO MEAS - LVIDD: 4.2 CM
BH CV ECHO MEAS - LVIDS: 2.5 CM
BH CV ECHO MEAS - LVLD AP2: 6.1 CM
BH CV ECHO MEAS - LVLD AP4: 7.3 CM
BH CV ECHO MEAS - LVLS AP2: 5.2 CM
BH CV ECHO MEAS - LVLS AP4: 6.1 CM
BH CV ECHO MEAS - LVOT AREA (M): 3.1 CM^2
BH CV ECHO MEAS - LVOT AREA: 3 CM^2
BH CV ECHO MEAS - LVOT DIAM: 2 CM
BH CV ECHO MEAS - LVPWD: 0.89 CM
BH CV ECHO MEAS - LVPWS: 1.5 CM
BH CV ECHO MEAS - MED PEAK E' VEL: 10.3 CM/SEC
BH CV ECHO MEAS - MEDIAL E/E' RATIO: 6.3
BH CV ECHO MEAS - MV A MAX VEL: 69.7 CM/SEC
BH CV ECHO MEAS - MV DEC TIME: 0.23 SEC
BH CV ECHO MEAS - MV E MAX VEL: 64.9 CM/SEC
BH CV ECHO MEAS - MV E/A: 0.93
BH CV ECHO MEAS - MV MAX PG: 3.3 MMHG
BH CV ECHO MEAS - MV MEAN PG: 2 MMHG
BH CV ECHO MEAS - MV V2 MAX: 90.5 CM/SEC
BH CV ECHO MEAS - MV V2 MEAN: 57.4 CM/SEC
BH CV ECHO MEAS - MV V2 VTI: 20.7 CM
BH CV ECHO MEAS - MVA(VTI): 2.5 CM^2
BH CV ECHO MEAS - PA ACC TIME: 0.13 SEC
BH CV ECHO MEAS - PA MAX PG (FULL): 2.5 MMHG
BH CV ECHO MEAS - PA MAX PG: 6.6 MMHG
BH CV ECHO MEAS - PA MEAN PG (FULL): 1.5 MMHG
BH CV ECHO MEAS - PA MEAN PG: 3.5 MMHG
BH CV ECHO MEAS - PA PR(ACCEL): 21.9 MMHG
BH CV ECHO MEAS - PA V2 MAX: 128.5 CM/SEC
BH CV ECHO MEAS - PA V2 MEAN: 78.8 CM/SEC
BH CV ECHO MEAS - PA V2 VTI: 20.9 CM
BH CV ECHO MEAS - PULM DIAS VEL: 38.8 CM/SEC
BH CV ECHO MEAS - PULM S/D: 1.6
BH CV ECHO MEAS - PULM SYS VEL: 61.3 CM/SEC
BH CV ECHO MEAS - RAP SYSTOLE: 3 MMHG
BH CV ECHO MEAS - RV MAX PG: 4.2 MMHG
BH CV ECHO MEAS - RV MEAN PG: 2 MMHG
BH CV ECHO MEAS - RV V1 MAX: 102 CM/SEC
BH CV ECHO MEAS - RV V1 MEAN: 66.4 CM/SEC
BH CV ECHO MEAS - RV V1 VTI: 18.9 CM
BH CV ECHO MEAS - RVSP: 23 MMHG
BH CV ECHO MEAS - SI(AO): 69.6 ML/M^2
BH CV ECHO MEAS - SI(CUBED): 31.9 ML/M^2
BH CV ECHO MEAS - SI(LVOT): 29.1 ML/M^2
BH CV ECHO MEAS - SI(MOD-SP2): 12.5 ML/M^2
BH CV ECHO MEAS - SI(MOD-SP4): 16.3 ML/M^2
BH CV ECHO MEAS - SI(TEICH): 30.9 ML/M^2
BH CV ECHO MEAS - SV(AO): 121.7 ML
BH CV ECHO MEAS - SV(CUBED): 55.8 ML
BH CV ECHO MEAS - SV(LVOT): 50.9 ML
BH CV ECHO MEAS - SV(MOD-SP2): 21.8 ML
BH CV ECHO MEAS - SV(MOD-SP4): 28.5 ML
BH CV ECHO MEAS - SV(TEICH): 54.1 ML
BH CV ECHO MEAS - TAPSE (>1.6): 2.3 CM
BH CV ECHO MEAS - TR MAX PG: 20 MMHG
BH CV ECHO MEAS - TR MAX VEL: 225 CM/SEC
BH CV ECHO MEASUREMENTS AVERAGE E/E' RATIO: 5.95
BH CV XLRA - RV BASE: 3.1 CM
BH CV XLRA - RV LENGTH: 5.8 CM
BH CV XLRA - RV MID: 2.4 CM
BH CV XLRA - TDI S': 12.7 CM/SEC
BILIRUB UR QL STRIP: NEGATIVE
BUN SERPL-MCNC: 9 MG/DL (ref 6–20)
BUN/CREAT SERPL: 11.8 (ref 7–25)
CALCIUM SPEC-SCNC: 8.6 MG/DL (ref 8.6–10.5)
CHLORIDE SERPL-SCNC: 103 MMOL/L (ref 98–107)
CHOLEST SERPL-MCNC: 177 MG/DL (ref 0–200)
CLARITY UR: CLEAR
CO2 SERPL-SCNC: 25 MMOL/L (ref 22–29)
COLOR UR: YELLOW
CREAT SERPL-MCNC: 0.76 MG/DL (ref 0.57–1)
DEPRECATED RDW RBC AUTO: 38.9 FL (ref 37–54)
ERYTHROCYTE [DISTWIDTH] IN BLOOD BY AUTOMATED COUNT: 13.8 % (ref 12.3–15.4)
GFR SERPL CREATININE-BSD FRML MDRD: 84 ML/MIN/1.73
GLUCOSE SERPL-MCNC: 142 MG/DL (ref 65–99)
GLUCOSE UR STRIP-MCNC: NEGATIVE MG/DL
HCT VFR BLD AUTO: 42.6 % (ref 34–46.6)
HDLC SERPL-MCNC: 72 MG/DL (ref 40–60)
HGB BLD-MCNC: 14.2 G/DL (ref 12–15.9)
HGB UR QL STRIP.AUTO: ABNORMAL
HYALINE CASTS UR QL AUTO: ABNORMAL /LPF
KETONES UR QL STRIP: ABNORMAL
LDLC SERPL CALC-MCNC: 92 MG/DL (ref 0–100)
LDLC/HDLC SERPL: 1.28 {RATIO}
LEFT ATRIUM VOLUME INDEX: 9.6 ML/M^2
LEFT ATRIUM VOLUME: 16.8 ML
LEUKOCYTE ESTERASE UR QL STRIP.AUTO: NEGATIVE
MAXIMAL PREDICTED HEART RATE: 180 BPM
MCH RBC QN AUTO: 26.3 PG (ref 26.6–33)
MCHC RBC AUTO-ENTMCNC: 33.3 G/DL (ref 31.5–35.7)
MCV RBC AUTO: 79 FL (ref 79–97)
NITRITE UR QL STRIP: NEGATIVE
PH UR STRIP.AUTO: 6 [PH] (ref 5–8)
PLATELET # BLD AUTO: 399 10*3/MM3 (ref 140–450)
PMV BLD AUTO: 9.1 FL (ref 6–12)
POTASSIUM SERPL-SCNC: 3.7 MMOL/L (ref 3.5–5.2)
PROT UR QL STRIP: NEGATIVE
RBC # BLD AUTO: 5.39 10*6/MM3 (ref 3.77–5.28)
RBC # UR: ABNORMAL /HPF
REF LAB TEST METHOD: ABNORMAL
SODIUM SERPL-SCNC: 138 MMOL/L (ref 136–145)
SP GR UR STRIP: 1.02 (ref 1–1.03)
SQUAMOUS #/AREA URNS HPF: ABNORMAL /HPF
STRESS TARGET HR: 153 BPM
TRIGL SERPL-MCNC: 66 MG/DL (ref 0–150)
TROPONIN T SERPL-MCNC: <0.01 NG/ML (ref 0–0.03)
TSH SERPL DL<=0.05 MIU/L-ACNC: 0.93 UIU/ML (ref 0.27–4.2)
UROBILINOGEN UR QL STRIP: ABNORMAL
VLDLC SERPL-MCNC: 13 MG/DL (ref 5–40)
WBC # BLD AUTO: 5.86 10*3/MM3 (ref 3.4–10.8)
WBC UR QL AUTO: ABNORMAL /HPF

## 2021-06-16 PROCEDURE — 93306 TTE W/DOPPLER COMPLETE: CPT | Performed by: INTERNAL MEDICINE

## 2021-06-16 PROCEDURE — 84443 ASSAY THYROID STIM HORMONE: CPT | Performed by: FAMILY MEDICINE

## 2021-06-16 PROCEDURE — G0378 HOSPITAL OBSERVATION PER HR: HCPCS

## 2021-06-16 PROCEDURE — 94799 UNLISTED PULMONARY SVC/PX: CPT

## 2021-06-16 PROCEDURE — 80061 LIPID PANEL: CPT | Performed by: FAMILY MEDICINE

## 2021-06-16 PROCEDURE — 81001 URINALYSIS AUTO W/SCOPE: CPT | Performed by: FAMILY MEDICINE

## 2021-06-16 PROCEDURE — 99217 PR OBSERVATION CARE DISCHARGE MANAGEMENT: CPT | Performed by: NURSE PRACTITIONER

## 2021-06-16 PROCEDURE — 93306 TTE W/DOPPLER COMPLETE: CPT

## 2021-06-16 PROCEDURE — 80048 BASIC METABOLIC PNL TOTAL CA: CPT | Performed by: FAMILY MEDICINE

## 2021-06-16 PROCEDURE — 84484 ASSAY OF TROPONIN QUANT: CPT | Performed by: FAMILY MEDICINE

## 2021-06-16 PROCEDURE — 85027 COMPLETE CBC AUTOMATED: CPT | Performed by: FAMILY MEDICINE

## 2021-06-16 RX ORDER — CLONAZEPAM 0.5 MG/1
2 TABLET ORAL ONCE
Status: COMPLETED | OUTPATIENT
Start: 2021-06-16 | End: 2021-06-16

## 2021-06-16 RX ORDER — HYDROCHLOROTHIAZIDE 12.5 MG/1
12.5 CAPSULE, GELATIN COATED ORAL DAILY
COMMUNITY
End: 2021-07-16 | Stop reason: SDDI

## 2021-06-16 RX ORDER — IBUPROFEN 600 MG/1
600 TABLET ORAL AS NEEDED
COMMUNITY

## 2021-06-16 RX ORDER — ASPIRIN 81 MG/1
81 TABLET ORAL DAILY
COMMUNITY

## 2021-06-16 RX ADMIN — BUDESONIDE 0.5 MG: 0.5 INHALANT RESPIRATORY (INHALATION) at 07:18

## 2021-06-16 RX ADMIN — SODIUM CHLORIDE 50 ML/HR: 9 INJECTION, SOLUTION INTRAVENOUS at 15:15

## 2021-06-16 RX ADMIN — CLONAZEPAM 2 MG: 0.5 TABLET ORAL at 00:23

## 2021-06-16 NOTE — PLAN OF CARE
Goal Outcome Evaluation:  Plan of Care Reviewed With: patient   Patient discharged to home with family at this time. Instructed to make follow-up appointment in the AM during normal business hours. Educated on discharge activity/medications/instructions - verbalized understanding. Awaiting orderly for transport to private vehicle.

## 2021-06-16 NOTE — CASE MANAGEMENT/SOCIAL WORK
Case Management Discharge Note                Selected Continued Care - Admitted Since 6/15/2021      Transportation Services  Private: Car    Final Discharge Disposition Code: 01 - home or self-care

## 2021-06-16 NOTE — DISCHARGE SUMMARY
[facility]   DISCHARGE SUMMARY      Name:  Marlin Silva   Age:  40 y.o.  Sex:  female  :  1980  MRN:  [id number]   Visit Number:  [id number]    Admission Date:  6/15/2021  Date of Discharge:  2021  Primary Care Physician:  Kris Melton MD    Important issues to note:    1.  Admitted for atypical chest pain for observation.      2.  Echo was normal.  Troponins were normal x3.  CT of chest did not note any abnormalities or PE.    3.  Stable for discharge home after discussing with cardiology.  Outpatient stress test if continued chest pain followed by PCP.      Discharge Diagnoses:     Active Hospital Problems    Diagnosis  POA   • **Chest pain, atypical [R07.89]  Yes   • Moderate persistent asthma without complication [J45.40]  Yes   • Anxiety disorder [F41.9]  Yes   • Fibromyalgia [M79.7]  Yes      Resolved Hospital Problems   No resolved problems to display.     Presenting Problem:    Chief Complaint   Patient presents with   • Chest Pain      Consults:     Consulting Physician(s)             None          History of presenting illness/Hospital Course:    Patient is a 40 year old female who presented to the emergency department last night with complaints of chest pain/ heaviness for past 4 days.  Patient with health history significant for asthma, anxiety, GERD, depression, arthritis, and fibromyalgia.  Patient complained of being fatigued and more short of breath with sitting up and on exertion.  Patient has significant history of asthma currently on Flovent, Albuterol, and Theophylline.  Patient with family history significant for CHF and MI in grandparents.  Both parents are still living--father with hypertension and mother with A-Fib.  Brother with no known health problems.  Recently treated for sinus infection.    Work up in the ED noted mildly tachycardic, no hypoxia, 2 normal troponins, and a normal d-dimer.  Magnesium noted to be 2.2.  Theophylline  level-20.2 and proBNP-55.  CT of chest did not note any PE, pneumothorax, pneumonia, or pleural effusion.  CXR with no abnormalities.  Discussed with Cardiology in the ED who did believe it was cardiac etiology.  Patient admitted with no tele abnormalities overnight.  Echo was normal today noted LVEF-50-55% with normal LV diastolic filling pattern, normal left atrial volume index, normal right ventricular size and systolic function, and no valve abnormalities. Patient did report feeling better after IV fluids overnight.  Discussed briefly with Dr. Hudson and will discharge the patient to follow up with PCP.  If continued chest pain consider stress test outpatient.   Chest pain is atypical in nature and not consistent with ACS at this time, however, could be anxiety related, or related to patient's known fibromyalgia.  Patient will follow up with PCP as scheduled prior to discharge. Continue current home medications at discharge.      Vital Signs:    Temp:  [97.6 °F (36.4 °C)-98.4 °F (36.9 °C)] 97.8 °F (36.6 °C)  Heart Rate:  [75-98] 86  Resp:  [16-20] 20  BP: (114-153)/(60-90) 118/74    Physical Exam:    General Appearance:  Alert and cooperative, resting in bed on exam with no acute distress noted. Pleasant middle aged woman.   Head:  Atraumatic and normocephalic.   Eyes: Conjunctivae and sclerae normal, no icterus. No pallor.   Ears:  Ears with no abnormalities noted.   Throat: No oral lesions, no thrush, oral mucosa moist.   Neck: Supple, trachea midline   Back:   No kyphoscoliosis present. No tenderness to palpation.   Lungs:   Breath sounds heard bilaterally equally.  No crackles or wheezing. Unlabored in conversation.   Heart:  Normal S1 and S2, no murmur, no gallop, no rub. No JVD.   Abdomen:   Normal bowel sounds, no masses, no organomegaly. Soft, nontender, nondistended, no rebound tenderness.   Extremities: Supple, no edema, no cyanosis, no clubbing.   Pulses: Pulses palpable bilaterally.   Skin: No bleeding  or rash.   Neurologic: Alert and oriented x 3. No facial asymmetry. Moves all four limbs. No tremors.     Pertinent Lab Results:     Results from last 7 days   Lab Units 06/16/21  0601 06/15/21  1409 06/10/21  1230   SODIUM mmol/L 138 139 142   POTASSIUM mmol/L 3.7 3.6 4.3   CHLORIDE mmol/L 103 101 109*   CO2 mmol/L 25.0 28.0 21.3*   BUN mg/dL 9 9 8   CREATININE mg/dL 0.76 0.88 0.85   CALCIUM mg/dL 8.6 9.5 9.2   BILIRUBIN mg/dL  --  0.3 0.2   ALK PHOS U/L  --  93 79   ALT (SGPT) U/L  --  16 16   AST (SGOT) U/L  --  18 14   GLUCOSE mg/dL 142* 132* 219*     Results from last 7 days   Lab Units 06/16/21  0601 06/15/21  1409 06/10/21  1230   WBC 10*3/mm3 5.86 7.90 6.46   HEMOGLOBIN g/dL 14.2 14.2 14.0   HEMATOCRIT % 42.6 41.7 42.3   PLATELETS 10*3/mm3 399 383 363         Results from last 7 days   Lab Units 06/16/21  0601 06/15/21  1722 06/15/21  1409   TROPONIN T ng/mL <0.010 <0.010 <0.010     Results from last 7 days   Lab Units 06/15/21  1409   PROBNP pg/mL 55.6         Results from last 7 days   Lab Units 06/15/21  1409   LIPASE U/L 11*               Pertinent Radiology Results:    Imaging Results (All)     Procedure Component Value Units Date/Time    CT Chest Pulmonary Embolism [[id number]] Collected: 06/15/21 1739     Updated: 06/15/21 1740    Narrative:      FINAL REPORT    TECHNIQUE:  Multiple axial CT images were obtained through the chest  following IV contrast using a CTA/PE protocol.  3D/MIP  reconstruction images were also performed. This study was  performed with techniques to keep radiation doses as low as  reasonably achievable (ALARA). Individualized dose reduction  techniques using automated exposure control or adjustment of mA  and/or kV according to the patient's size were employed.    CLINICAL HISTORY:  CP, soa, abnl EKG  pe protocol    FINDINGS:  PAs and aorta: No pulmonary embolus.  Thoracic aorta is  unremarkable.  No significant coronary artery calcifications.  Heart/mediastinum: No evidence for  right heart strain.  No  pericardial effusion.  The heart is normal in size.  Lungs: Mild  atelectasis.  Otherwise the lungs are clear.  Lymph nodes: No  pathologically enlarged thoracic lymph nodes.  Pleura: No  pneumothorax or pleural effusion.  Chest Wall: No chest wall  contusion.  Bones: No acute fracture.  Upper abdomen: No acute  findings in the upper abdomen.      Impression:      No pulmonary embolus.  No acute findings in the chest.    Authenticated by Marlon Parish MD on 06/15/2021 05:39:26 PM    XR Chest 1 View [[id number]] Collected: 06/15/21 1511     Updated: 06/15/21 1518    Narrative:      PROCEDURE: XR CHEST 1 VW-     HISTORY: Chest Pain Triage Protocol     COMPARISON: November 13, 2017.     FINDINGS: The heart is normal in size. The mediastinum is unremarkable.  The lungs are clear. There is no pneumothorax.  There are no acute  osseous abnormalities.       Impression:      No acute cardiopulmonary process.     Continued followup is recommended.     This report was finalized on 6/15/2021 3:11 PM by Yahir Pool M.D..          Echo:    Results for orders placed during the hospital encounter of 06/15/21    Adult Transthoracic Echo Complete W/ Cont if Necessary Per Protocol    Interpretation Summary  1.  Normal left ventricular size and systolic function, LVEF 55-60%.  2.  Normal LV diastolic filling pattern.  3.  Normal left atrial volume index.  4.  Normal right ventricular size and systolic function.  5.  No significant valvular abnormalities.    Condition on Discharge:      Stable.    Code status during the hospital stay:    Code Status and Medical Interventions:   Ordered at: 06/15/21 1847     Code Status:    CPR     Medical Interventions (Level of Support Prior to Arrest):    Full     Discharge Disposition:    Home or Self Care    Discharge Medications:       Discharge Medications      Continue These Medications      Instructions Start Date   albuterol sulfate  (90 Base) MCG/ACT  inhaler  Commonly known as: PROVENTIL HFA;VENTOLIN HFA;PROAIR HFA   2 puffs, Inhalation, Every 4 Hours PRN      albuterol (5 MG/ML) 0.5% nebulizer solution  Commonly known as: PROVENTIL   2.5 mg, Nebulization, Every 6 Hours PRN      aspirin 81 MG EC tablet   81 mg, Oral, Daily      CLARITIN-D 24 HOUR PO   1 tablet, Oral, Daily      fluticasone 220 MCG/ACT inhaler  Commonly known as: FLOVENT HFA   1 puff, Inhalation, Nightly      hydroCHLOROthiazide 12.5 MG capsule  Commonly known as: MICROZIDE   12.5 mg, Oral, Daily      HYDROcodone-acetaminophen 7.5-325 MG per tablet  Commonly known as: NORCO   1 tablet, Oral, Every 6 Hours PRN      ibuprofen 600 MG tablet  Commonly known as: ADVIL,MOTRIN   600 mg, Oral, As Needed      KlonoPIN 1 MG tablet  Generic drug: clonazePAM   1 mg, Oral, Nightly      melatonin 5 MG sublingual tablet sublingual tablet   10 mg, Sublingual, Nightly      NON FORMULARY   1 Units, Oral, 3 Times Daily, Neora Weight Loss Supplement; uses 1 packet three times daily       pantoprazole 40 MG EC tablet  Commonly known as: PROTONIX   40 mg, Oral, Nightly      PROBIOTIC ADVANCED PO   1 tablet, Oral, Daily      theophylline 600 MG 24 hr tablet  Commonly known as: UNIPHYL   600 mg, Oral, Daily      traMADol 50 MG tablet  Commonly known as: ULTRAM   50 mg, Oral, Every 6 Hours PRN         Stop These Medications    METOLAZONE PO          Discharge Diet:     Diet Instructions     Advance Diet As Tolerated          Activity at Discharge:     Activity Instructions     Activity as Tolerated      Gradually Increase Activity Until at Pre-Hospitalization Level          Follow-up Appointments:    Follow-up Information     Kris Melton MD Follow up in 1 week(s).    Specialty: Family Medicine  Why: Please schedule appointment prior to discharge.  Contact information:  [address] [place] [address] [phone]                 No future appointments.  Test Results Pending at Discharge:              Irma Pantoja, APRN  06/16/21  15:46 EDT    Time: I spent 28 minutes on this discharge activity which included: face-to-face encounter with the patient, reviewing the data in the system, coordination of the care with the nursing staff as well as consultants, documentation, and entering orders.     Dictated utilizing Dragon dictation.

## 2021-06-16 NOTE — CASE MANAGEMENT/SOCIAL WORK
Discharge Planning Assessment  [facility]     Patient Name: Marlin Silva  MRN: [id number]  Today's Date: 6/16/2021    Admit Date: 6/15/2021    Discharge Needs Assessment     Row Name 06/16/21 1241       Living Environment    Lives With  child(romulo), dependent    Name(s) of Who Lives With Patient  Three children-  oldest is 17 and just graduated from high school.    Current Living Arrangements  home/apartment/condo    Primary Care Provided by  self    Provides Primary Care For  child(romuol) children are older, can help their mother.    Family Caregiver if Needed  other (see comments) children and mother if needed    Quality of Family Relationships  supportive    Able to Return to Prior Arrangements  yes       Resource/Environmental Concerns    Resource/Environmental Concerns  none    Transportation Concerns  car, none       Transition Planning    Patient/Family Anticipates Transition to  home with family    Patient/Family Anticipated Services at Transition  none    Transportation Anticipated  family or friend will provide       Discharge Needs Assessment    Readmission Within the Last 30 Days  no previous admission in last 30 days    Equipment Currently Used at Home  nebulizer    Anticipated Changes Related to Illness  none    Equipment Needed After Discharge  none        Discharge Plan     Row Name 06/16/21 9256       Plan    Plan Met with patient in room.  Daughter and mother are present for conversation and pt said it was fine to speak in front of them.  No POA/LW.  Demographics verified. Patient is normally fully independent and works full time. No DME company or HH.  Plan is to go home at DC.  Denies having any unmet needs at home. CM contact card given to patient, previously enrolled in meds to beds. Possible DC today after ECHO is read by Dr. Hudson.         Continued Care and Services - Admitted Since 6/15/2021    Coordination has not been started for this encounter.         Demographic Summary     Row Name  06/16/21 1239       General Information    Admission Type  observation    Arrived From  emergency department    Referral Source  admission list    Reason for Consult  discharge planning    Preferred Language  English     Used During This Interaction  no        Functional Status     Row Name 06/16/21 1239       Functional Status    Usual Activity Tolerance  good    Current Activity Tolerance  good       Functional Status, IADL    Medications  independent    Meal Preparation  independent    Housekeeping  independent    Laundry  independent    Shopping  independent       Mental Status    General Appearance WDL  WDL       Mental Status Summary    Recent Changes in Mental Status/Cognitive Functioning  no changes       Employment/    Employment Status  employed full-time    Current or Previous Occupation  education        Psychosocial     Row Name 06/16/21 1239       Values/Beliefs    Spiritual, Cultural Beliefs, [employer] Practices, Values that Affect Care  no       Behavior WDL    Behavior WDL  WDL       Emotion Mood WDL    Emotion/Mood/Affect WDL  WDL       Speech WDL    Speech WDL  WDL       Perceptual State WDL    Perceptual State WDL  WDL       Thought Process WDL    Thought Process WDL  WDL       Intellectual Performance WDL    Intellectual Performance WDL  WDL    Level of Consciousness  Alert       Coping/Stress    Major Change/Loss/Stressor  none    Sources of Support  parent(s);dependent child(romulo)    Reaction to Health Status  adjusting    Understanding of Condition and Treatment  adequate understanding of medical condition;adequate understanding of treatment       C-SSRS (Recent)    Q1 Wished to be Dead (Past Month)  no    Q2 Suicidal Thoughts (Past Month)  no    Q6 Suicide Behavior (Lifetime)  no       Violence Risk    Feels Like Hurting Others  no    Previous Attempt to Harm Others  no        Abuse/Neglect     Row Name 06/16/21 1241       Personal Safety    Feels Unsafe at Home or  Work/School  no    Feels Threatened by Someone  no    Does Anyone Try to Keep You From Having Contact with Others or Doing Things Outside Your Home?  no    Physical Signs of Abuse Present  no        Legal    No documentation.       Substance Abuse    No documentation.       Patient Forms    No documentation.           Andreina Breen RN

## 2021-06-16 NOTE — PLAN OF CARE
Goal Outcome Evaluation:              Outcome Summary: Pt with no further complaints of chest pain this shift.  Continues to receive iv fluids per orders.  Up in room ad billie.  Labs moniotored daily.  Telemetry monitoring continued with NSR with slight ST depression noted.

## 2021-06-16 NOTE — DISCHARGE INSTRUCTIONS
You are being discharged home today.  Your echo of the heart was normal.  Your cardiac blood tests were normal.  Please follow up with your PCP to schedule a stress test outpatient if your chest pain continues.  Continue your home medications.  Return to the hospital with any increased chest pain, palpitations, or shortness of breath.  Follow up with your PCP as scheduled.

## 2021-07-16 ENCOUNTER — OFFICE VISIT (OUTPATIENT)
Dept: CARDIOLOGY | Facility: CLINIC | Age: 41
End: 2021-07-16

## 2021-07-16 VITALS
DIASTOLIC BLOOD PRESSURE: 92 MMHG | BODY MASS INDEX: 28.35 KG/M2 | HEART RATE: 107 BPM | WEIGHT: 160 LBS | HEIGHT: 63 IN | SYSTOLIC BLOOD PRESSURE: 144 MMHG

## 2021-07-16 DIAGNOSIS — R00.2 PALPITATIONS: ICD-10-CM

## 2021-07-16 DIAGNOSIS — R06.09 DOE (DYSPNEA ON EXERTION): Primary | ICD-10-CM

## 2021-07-16 PROCEDURE — 99244 OFF/OP CNSLTJ NEW/EST MOD 40: CPT | Performed by: INTERNAL MEDICINE

## 2021-07-16 PROCEDURE — 93000 ELECTROCARDIOGRAM COMPLETE: CPT | Performed by: INTERNAL MEDICINE

## 2021-07-16 RX ORDER — DIPHENHYDRAMINE HCL 25 MG
25 CAPSULE ORAL NIGHTLY PRN
COMMUNITY

## 2021-07-16 RX ORDER — BISOPROLOL FUMARATE 5 MG/1
5 TABLET, FILM COATED ORAL 2 TIMES DAILY PRN
Qty: 30 TABLET | Refills: 3 | Status: SHIPPED | OUTPATIENT
Start: 2021-07-16 | End: 2021-11-08

## 2021-07-16 NOTE — PROGRESS NOTES
[facility] Cardiology  Consultation H&P  Marlin Silva  1980  [address]  [facility] [address]     VISIT DATE:  07/16/21    PCP: Kris Melton MD  [address] [place] [address]    IDENTIFICATION: A 40 y.o. female     PROBLEM LIST:  Chest pain/Dyspnea    6/21 echo EF 60% wnl    6/21 CTA chest -PE wnl    6/21 bnp 55, troponin neg, cbc wnl .  Theophylline 20(10-20)    Asthma- prev Dr Swanson. Intubated age 8. Steroid tapers q 2 months.    Fibromyalgia    CC:  Chief Complaint   Patient presents with   • Shortness of Breath   • Hypertension   • Chest Pain   • Palpitations       Allergies  Allergies   Allergen Reactions   • Sulfa Antibiotics Other (See Comments)     REPORTS CAUSED HAND TREMORS AND HEART PALPITATIONS         Current Medications    Current Outpatient Medications:   •  albuterol (PROVENTIL HFA;VENTOLIN HFA) 108 (90 Base) MCG/ACT inhaler, Inhale 2 puffs Every 4 (Four) Hours As Needed for Wheezing., Disp: , Rfl:   •  albuterol (PROVENTIL) (5 MG/ML) 0.5% nebulizer solution, Take 2.5 mg by nebulization Every 6 (Six) Hours As Needed for Wheezing., Disp: , Rfl:   •  aspirin 81 MG EC tablet, Take 81 mg by mouth Daily., Disp: , Rfl:   •  clonazePAM (KLONOPIN) 1 MG tablet, Take 1 mg by mouth Every Night., Disp: , Rfl:   •  diphenhydrAMINE (BENADRYL) 25 mg capsule, Take 25 mg by mouth Every Night., Disp: , Rfl:   •  HYDROcodone-acetaminophen (NORCO) 7.5-325 MG per tablet, Take 1 tablet by mouth Every 6 (Six) Hours As Needed for Moderate Pain ., Disp: , Rfl:   •  ibuprofen (ADVIL,MOTRIN) 600 MG tablet, Take 600 mg by mouth As Needed for Mild Pain ., Disp: , Rfl:   •  Loratadine-Pseudoephedrine (CLARITIN-D 24 HOUR PO), Take 1 tablet by mouth Daily., Disp: , Rfl:   •  melatonin 5 MG sublingual tablet sublingual tablet, Place 10 mg under the tongue Every Night., Disp: , Rfl:   •  NON FORMULARY, Take 1 Units by mouth 3 (Three) Times a Day. Neora Weight Loss Supplement; uses 1 packet  three times daily, Disp: , Rfl:   •  pantoprazole (PROTONIX) 40 MG EC tablet, Take 40 mg by mouth Every Night., Disp: , Rfl:   •  Probiotic Product (PROBIOTIC ADVANCED PO), Take 1 tablet by mouth Daily., Disp: , Rfl:   •  theophylline (UNIPHYL) 600 MG 24 hr tablet, Take 600 mg by mouth Daily., Disp: , Rfl:   •  traMADol (ULTRAM) 50 MG tablet, Take 50 mg by mouth Every 6 (Six) Hours As Needed for Moderate Pain ., Disp: , Rfl:   •  hydroCHLOROthiazide (MICROZIDE) 12.5 MG capsule, Take 12.5 mg by mouth Daily., Disp: , Rfl:      History of Present Illness   HPI  Marlin Silva is a 40 y.o. year old female with the above mentioned PMH who presents for consult from Kris Melton,* for evaluation of tachypalpitations and dyspnea.  She states she has had steroid intermittently dependent asthma since infancy.  Most recently she had a crescendo pattern and has been emergency department twice in the last 1 month.  She has undergone serologies, CT scan, Holter and echo that were relatively benign outside of sinus tachycardia.  States she was taken to the emergency department in [place] and was noted instructed that she was dehydrated.  She notes that with activities of daily living she has significant fatigue and shortness of breath with tachypalpitations.           ROS  Review of Systems   Constitutional: Negative for chills, fever, malaise/fatigue, night sweats, weight gain and weight loss.   HENT: Negative for hearing loss and nosebleeds.    Eyes: Negative for blurred vision, vision loss in left eye, vision loss in right eye, visual disturbance and visual halos.   Cardiovascular: Positive for dyspnea on exertion, near-syncope and palpitations. Negative for chest pain, claudication, cyanosis, irregular heartbeat, leg swelling, orthopnea, paroxysmal nocturnal dyspnea and syncope.   Respiratory: Positive for shortness of breath and wheezing. Negative for cough, hemoptysis and snoring.    Endocrine: Negative for  "cold intolerance, heat intolerance, polydipsia, polyphagia and polyuria.   Hematologic/Lymphatic: Negative for adenopathy and bleeding problem. Does not bruise/bleed easily.   Skin: Negative for dry skin, poor wound healing and rash.   Musculoskeletal: Negative for falls, joint pain, joint swelling, muscle cramps, muscle weakness, myalgias and neck pain.   Gastrointestinal: Negative for bloating, abdominal pain, change in bowel habit, bowel incontinence, constipation, diarrhea, dysphagia, excessive appetite, heartburn, hematemesis, hematochezia, jaundice, melena, nausea and vomiting.   Genitourinary: Negative for bladder incontinence, dysuria, flank pain, hematuria, hesitancy and nocturia.   Neurological: Negative for aphonia, excessive daytime sleepiness, dizziness, focal weakness, headaches, light-headedness, loss of balance, seizures, sensory change, tremors, vertigo and weakness.   Psychiatric/Behavioral: Negative for altered mental status, depression, memory loss, substance abuse and suicidal ideas. The patient is not nervous/anxious.    All other systems reviewed and are negative.      SOCIAL HX  Social History     Socioeconomic History   • Marital status:      Spouse name: Not on file   • Number of children: Not on file   • Years of education: Not on file   • Highest education level: Not on file   Tobacco Use   • Smoking status: Never Smoker   • Smokeless tobacco: Never Used   Substance and Sexual Activity   • Alcohol use: Yes     Comment: SOCIAL USE ONLY, NO HX OF ABUSE REPORTED   • Drug use: No   • Sexual activity: Defer       FAMILY HX  Family History   Problem Relation Age of Onset   • Hypertension Father    • Cancer Maternal Aunt    • Diabetes Paternal Uncle        Vitals:    07/16/21 1336   BP: 144/92   BP Location: Left arm   Patient Position: Sitting   Pulse: 107   Weight: 72.6 kg (160 lb)   Height: 160 cm (63\")     Body mass index is 28.34 kg/m².     PHYSICAL EXAMINATION:  Constitutional:     "   Appearance: Healthy appearance. Not in distress.   Neck:      Vascular: No JVR. JVD normal.   Pulmonary:      Effort: Pulmonary effort is normal.      Breath sounds: Normal breath sounds. No wheezing. No rhonchi. No rales.   Chest:      Chest wall: Not tender to palpatation.   Cardiovascular:      PMI at left midclavicular line. Normal rate. Regular rhythm. Normal S1. Normal S2.      Murmurs: There is no murmur.      No gallop. No click. No rub.   Pulses:     Intact distal pulses.   Edema:     Peripheral edema absent.   Abdominal:      General: Bowel sounds are normal.      Palpations: Abdomen is soft.      Tenderness: There is no abdominal tenderness.   Musculoskeletal: Normal range of motion.         General: No tenderness. Skin:     General: Skin is warm and dry.   Neurological:      General: No focal deficit present.      Mental Status: Alert and oriented to person, place and time.         Diagnostic Data:    ECG 12 Lead    Date/Time: 7/16/2021 2:23 PM  Performed by: Jean Pichardo MD  Authorized by: Jean Pichardo MD   Previous ECG: no previous ECG available  Rhythm: sinus tachycardia  BPM: 107  ST Depression: all            Lab Results   Component Value Date    TRIG 66 06/16/2021    HDL 72 (H) 06/16/2021     Lab Results   Component Value Date    GLUCOSE 142 (H) 06/16/2021    BUN 9 06/16/2021    CREATININE 0.76 06/16/2021     06/16/2021    K 3.7 06/16/2021     06/16/2021    CO2 25.0 06/16/2021     No results found for: HGBA1C  Lab Results   Component Value Date    WBC 5.86 06/16/2021    HGB 14.2 06/16/2021    HCT 42.6 06/16/2021     06/16/2021       ASSESSMENT:   Diagnosis Plan   1. HAZEL (dyspnea on exertion)     2. Palpitations         PLAN:  Dyspnea and palpitations with abnormal theophylline level most recently obtained.  Patient states that she is very reluctant to his decrease theophylline dosing despite her toxic levels.  I discussed with her these are classic side effects of  "potential theophylline toxicity.  In the interim Zebeta 2.5 mg pill in a pocket type of formulation will be afforded to her and I would discontinue HCTZ which remains on her formulary as she was listed as \"dehydrated\"  I would document CTA of the adrenal glands screen for occult pheochromocytoma and will check a 24-hour urine with spot checking serum catecholamines        Kris Melton,*, thank you for referring Ms. Silva for evaluation.  I have forwarded my electronically generated recommendations to you for review.  Please do not hesitate to call with any questions.      Jean Pichardo MD, FACC      "

## 2021-08-24 ENCOUNTER — LAB (OUTPATIENT)
Dept: LAB | Facility: HOSPITAL | Age: 41
End: 2021-08-24

## 2021-08-24 DIAGNOSIS — R00.2 PALPITATIONS: ICD-10-CM

## 2021-08-24 PROCEDURE — 36415 COLL VENOUS BLD VENIPUNCTURE: CPT

## 2021-08-24 PROCEDURE — 82384 ASSAY THREE CATECHOLAMINES: CPT

## 2021-08-29 LAB
DOPAMINE SERPL-MCNC: <30 PG/ML (ref 0–48)
EPINEPH PLAS-MCNC: 16 PG/ML (ref 0–62)
NOREPINEPH PLAS-MCNC: 441 PG/ML (ref 0–874)

## 2021-09-20 PROCEDURE — 82384 ASSAY THREE CATECHOLAMINES: CPT | Performed by: INTERNAL MEDICINE

## 2021-09-22 ENCOUNTER — OFFICE VISIT (OUTPATIENT)
Dept: CARDIOLOGY | Facility: CLINIC | Age: 41
End: 2021-09-22

## 2021-09-22 VITALS
SYSTOLIC BLOOD PRESSURE: 124 MMHG | BODY MASS INDEX: 28.86 KG/M2 | HEART RATE: 96 BPM | DIASTOLIC BLOOD PRESSURE: 72 MMHG | HEIGHT: 63 IN | OXYGEN SATURATION: 98 % | WEIGHT: 162.9 LBS

## 2021-09-22 DIAGNOSIS — R07.1 CHEST PAIN ON BREATHING: ICD-10-CM

## 2021-09-22 DIAGNOSIS — R00.2 PALPITATIONS: Primary | ICD-10-CM

## 2021-09-22 PROCEDURE — 99213 OFFICE O/P EST LOW 20 MIN: CPT | Performed by: INTERNAL MEDICINE

## 2021-09-22 RX ORDER — TEMAZEPAM 15 MG/1
15 CAPSULE ORAL
COMMUNITY
Start: 2021-08-30

## 2021-09-22 RX ORDER — PREDNISONE 20 MG/1
20 TABLET ORAL
COMMUNITY
End: 2023-01-14

## 2021-09-22 RX ORDER — ARIPIPRAZOLE 2 MG/1
2 TABLET ORAL DAILY
COMMUNITY
Start: 2021-09-13

## 2021-09-22 NOTE — PROGRESS NOTES
[facility] Cardiology  Office Progress Note  Marlin Silva  1980  [address] [facility] [address]       Visit Date: 21    PCP: Kris Melton MD  [address] [place] [place] [address]    IDENTIFICATION: A 41 y.o. female     PROBLEM LIST:   1. Chest pain/Dyspnea  1.  echo EF 60% wnl  2.  CTA chest -PE wnl  3.  bnp 55, troponin neg, cbc wnl .  Theophylline 20(10-20)  2. Palpitations  1. 21 4 day Holter monitor: Av, min: 57, Max: 139. Afib 0.0%. Pac <0.1%. PVC <0.1%.  2. 21 Serum Catecholamines: WNL  3. Asthma- prev Dr Swanson. Intubated age 8. Steroid tapers q 2 months.  4. Fibromyalgia  5. Surgical  1. Saline Breast implants      CC:   Chief Complaint   Patient presents with   • HAZEL (dyspnea on exertion)       Allergies  Allergies   Allergen Reactions   • Bactrim [Sulfamethoxazole-Trimethoprim] Dizziness     Severe shaking     • Sulfa Antibiotics Other (See Comments)     REPORTS CAUSED HAND TREMORS AND HEART PALPITATIONS         Current Medications    Current Outpatient Medications:   •  albuterol (PROVENTIL HFA;VENTOLIN HFA) 108 (90 Base) MCG/ACT inhaler, Inhale 2 puffs Every 4 (Four) Hours As Needed for Wheezing., Disp: , Rfl:   •  albuterol (PROVENTIL) (5 MG/ML) 0.5% nebulizer solution, Take 2.5 mg by nebulization Every 6 (Six) Hours As Needed for Wheezing., Disp: , Rfl:   •  ARIPiprazole (ABILIFY) 2 MG tablet, Take 2 mg by mouth Daily., Disp: , Rfl:   •  aspirin 81 MG EC tablet, Take 81 mg by mouth Daily., Disp: , Rfl:   •  bisoprolol (ZEBeta) 5 MG tablet, Take 1 tablet by mouth 2 (Two) Times a Day As Needed (sustained HR >120 bpm). (Patient taking differently: Take 5 mg by mouth 2 (Two) Times a Day As Needed (sustained HR >120 bpm). Pt taking 1/2 tablet daily), Disp: 30 tablet, Rfl: 3  •  clonazePAM (KLONOPIN) 1 MG tablet, Take 1 mg by mouth Every Night., Disp: , Rfl:   •  diphenhydrAMINE (BENADRYL) 25 mg capsule, Take 25 mg by mouth At Night As  "Needed., Disp: , Rfl:   •  HYDROcodone-acetaminophen (NORCO) 7.5-325 MG per tablet, Take 1 tablet by mouth Every 6 (Six) Hours As Needed for Moderate Pain ., Disp: , Rfl:   •  ibuprofen (ADVIL,MOTRIN) 600 MG tablet, Take 600 mg by mouth As Needed for Mild Pain ., Disp: , Rfl:   •  Loratadine-Pseudoephedrine (CLARITIN-D 24 HOUR PO), Take 1 tablet by mouth Daily As Needed., Disp: , Rfl:   •  melatonin 5 MG sublingual tablet sublingual tablet, Place 10 mg under the tongue Every Night., Disp: , Rfl:   •  pantoprazole (PROTONIX) 40 MG EC tablet, Take 40 mg by mouth Every Night., Disp: , Rfl:   •  predniSONE (DELTASONE) 20 MG tablet, Take 20 mg by mouth. Taking 3 tablets daily, Disp: , Rfl:   •  Probiotic Product (PROBIOTIC ADVANCED PO), Take 1 tablet by mouth Daily., Disp: , Rfl:   •  temazepam (RESTORIL) 15 MG capsule, Take 15 mg by mouth every night at bedtime., Disp: , Rfl:   •  theophylline (UNIPHYL) 600 MG 24 hr tablet, Take 600 mg by mouth Daily., Disp: , Rfl:   •  traMADol (ULTRAM) 50 MG tablet, Take 50 mg by mouth Every 6 (Six) Hours As Needed for Moderate Pain ., Disp: , Rfl:       History of Present Illness   Marlin Silva is a 41 y.o. year old female here for follow up.    Pt denies any new  chest pain, dyspnea, dyspnea on exertion, orthopnea, PND, palpitations, lower extremity edema, or claudication.  Continues to struggle with her asthma gets intermittently steroid-dependent and she cannot wean her historically not been able to wean off theophylline.  Family's been ill with Covid most recently including her son.  She has intermittent palpitations and she is tolerant of Zebeta if she takes this at night.      OBJECTIVE:  Vitals:    09/22/21 1058   BP: 124/72   BP Location: Right arm   Patient Position: Sitting   Pulse: 96   SpO2: 98%   Weight: 73.9 kg (162 lb 14.4 oz)   Height: 160 cm (63\")     Body mass index is 28.86 kg/m².    Constitutional:       Appearance: Healthy appearance. Not in distress. "   Neck:      Vascular: No JVR. JVD normal.   Pulmonary:      Effort: Pulmonary effort is normal.      Breath sounds: Normal breath sounds. No wheezing. No rhonchi. No rales.   Chest:      Chest wall: Not tender to palpatation.   Cardiovascular:      PMI at left midclavicular line. Normal rate. Regular rhythm. Normal S1. Normal S2.      Murmurs: There is no murmur.      No gallop. No click. No rub.   Pulses:     Intact distal pulses.   Edema:     Peripheral edema absent.   Abdominal:      General: Bowel sounds are normal.      Palpations: Abdomen is soft.      Tenderness: There is no abdominal tenderness.   Musculoskeletal: Normal range of motion.         General: No tenderness. Skin:     General: Skin is warm and dry.   Neurological:      General: No focal deficit present.      Mental Status: Alert and oriented to person, place and time.         Diagnostic Data:  Procedures      ASSESSMENT:   Diagnosis Plan   1. Palpitations     2. Chest pain on breathing         PLAN:  Palpitations improved with low-dose cardioselective beta-blockade with normal echocardiogram CT scan of the chest.  I have asked that she be seen by pulmonologist in [place] she has historically followed with Dr. Sue she there is no longer seen her.  Ideally if she could wean and discontinue theophylline and potentially not utilize systemic glucocorticoids    Pleuritic chest pain in setting of significant reactive airway disease          Jean Pichardo MD, [facility]

## 2021-09-23 LAB
DOPAMINE 24H UR-MRATE: 116 UG/24 HR (ref 0–510)
DOPAMINE UR-MCNC: 97 UG/L
EPINEPH 24H UR-MRATE: 2 UG/24 HR (ref 0–20)
EPINEPH UR-MCNC: 2 UG/L
NOREPINEPH 24H UR-MRATE: 24 UG/24 HR (ref 0–135)
NOREPINEPH UR-MCNC: 20 UG/L

## 2021-11-08 RX ORDER — BISOPROLOL FUMARATE 5 MG/1
5 TABLET, FILM COATED ORAL 2 TIMES DAILY PRN
Qty: 90 TABLET | Refills: 1 | Status: SHIPPED | OUTPATIENT
Start: 2021-11-08 | End: 2022-11-23

## 2022-11-23 RX ORDER — BISOPROLOL FUMARATE 5 MG/1
TABLET, FILM COATED ORAL
Qty: 90 TABLET | Refills: 1 | Status: SHIPPED | OUTPATIENT
Start: 2022-11-23

## 2022-12-08 ENCOUNTER — TRANSCRIBE ORDERS (OUTPATIENT)
Dept: ADMINISTRATIVE | Facility: HOSPITAL | Age: 42
End: 2022-12-08

## 2022-12-08 DIAGNOSIS — Z12.31 SCREENING MAMMOGRAM, ENCOUNTER FOR: Primary | ICD-10-CM

## 2023-01-14 ENCOUNTER — HOSPITAL ENCOUNTER (EMERGENCY)
Facility: HOSPITAL | Age: 43
Discharge: HOME | End: 2023-01-14
Attending: EMERGENCY MEDICINE | Admitting: EMERGENCY MEDICINE
Payer: COMMERCIAL

## 2023-01-14 VITALS
WEIGHT: 150 LBS | SYSTOLIC BLOOD PRESSURE: 125 MMHG | HEART RATE: 112 BPM | OXYGEN SATURATION: 97 % | BODY MASS INDEX: 26.58 KG/M2 | RESPIRATION RATE: 20 BRPM | DIASTOLIC BLOOD PRESSURE: 73 MMHG | HEIGHT: 63 IN | TEMPERATURE: 99.2 F

## 2023-01-14 DIAGNOSIS — J45.901 EXACERBATION OF ASTHMA, UNSPECIFIED ASTHMA SEVERITY, UNSPECIFIED WHETHER PERSISTENT: Primary | ICD-10-CM

## 2023-01-14 PROCEDURE — 96375 TX/PRO/DX INJ NEW DRUG ADDON: CPT

## 2023-01-14 PROCEDURE — 94640 AIRWAY INHALATION TREATMENT: CPT

## 2023-01-14 PROCEDURE — 25010000002 METHYLPREDNISOLONE PER 125 MG: Performed by: EMERGENCY MEDICINE

## 2023-01-14 PROCEDURE — 25010000002 MAGNESIUM SULFATE 2 GM/50ML SOLUTION: Performed by: EMERGENCY MEDICINE

## 2023-01-14 PROCEDURE — 99284 EMERGENCY DEPT VISIT MOD MDM: CPT

## 2023-01-14 PROCEDURE — 96365 THER/PROPH/DIAG IV INF INIT: CPT

## 2023-01-14 PROCEDURE — 94799 UNLISTED PULMONARY SVC/PX: CPT

## 2023-01-14 RX ORDER — MAGNESIUM SULFATE HEPTAHYDRATE 40 MG/ML
2 INJECTION, SOLUTION INTRAVENOUS ONCE
Status: COMPLETED | OUTPATIENT
Start: 2023-01-14 | End: 2023-01-14

## 2023-01-14 RX ORDER — IPRATROPIUM BROMIDE AND ALBUTEROL SULFATE 2.5; .5 MG/3ML; MG/3ML
SOLUTION RESPIRATORY (INHALATION)
Status: COMPLETED
Start: 2023-01-14 | End: 2023-01-14

## 2023-01-14 RX ORDER — PREDNISONE 20 MG/1
TABLET ORAL
Qty: 10 TABLET | Refills: 0 | OUTPATIENT
Start: 2023-01-14 | End: 2023-03-03

## 2023-01-14 RX ORDER — METHYLPREDNISOLONE SODIUM SUCCINATE 125 MG/2ML
125 INJECTION, POWDER, LYOPHILIZED, FOR SOLUTION INTRAMUSCULAR; INTRAVENOUS ONCE
Status: COMPLETED | OUTPATIENT
Start: 2023-01-14 | End: 2023-01-14

## 2023-01-14 RX ADMIN — IPRATROPIUM BROMIDE AND ALBUTEROL SULFATE 3 ML: 2.5; .5 SOLUTION RESPIRATORY (INHALATION) at 04:10

## 2023-01-14 RX ADMIN — METHYLPREDNISOLONE SODIUM SUCCINATE 125 MG: 125 INJECTION, POWDER, FOR SOLUTION INTRAMUSCULAR; INTRAVENOUS at 03:08

## 2023-01-14 RX ADMIN — MAGNESIUM SULFATE HEPTAHYDRATE 2 G: 40 INJECTION, SOLUTION INTRAVENOUS at 03:08

## 2023-01-14 NOTE — ED PROVIDER NOTES
"Subjective  History of Present Illness:    Chief Complaint: Shortness of breath wheezing  History of Present Illness: 42-year-old female presents above complaint, took 2 nebs at home, 2 nebs by EMS, history of asthma.  No relief.  No associated symptoms  Onset: Tonight  Duration: Persist  Nurses Notes reviewed and agree, including vitals, allergies, social history and prior medical history.     REVIEW OF SYSTEMS: All systems reviewed and not pertinent unless noted.  Review of Systems      Positive for: Shortness of breath and wheezing history of asthma    Negative for: Fever cough hemoptysis palpitation    Past Medical History:   Diagnosis Date   • Arthritis    • Asthma    • Depression    • Dysphagia     REPORTS \"IT'S LIKE THINGS WON'T GO DOWN SOMETIMES\"   • Fibromyalgia    • GERD (gastroesophageal reflux disease)     PATIENT REPORTS HAD ER VISIT IN DECEMBER OF 2016 FOR RIGHT SIDED PAIN.  PATIENT THOUGHT MAY BE HEART RELATED BUT REPORTS WAS TOLD ONLY REFLUX.   • History of bronchitis    • History of kidney stones     REPORTS PASSED WITHOUT SURGICAL INTERVENTION IN SUMMER OF 2017   • History of panic attacks    • History of pneumonia    • Insomnia    • MRSA (methicillin resistant Staphylococcus aureus) 2008    REPORTS IN SINUS CAVITY AND THAT SHE WAS TREATED   • Neck pain    • TIA (transient ischemic attack)     REPORTS HX OF TIA 8 YEARS AGO AND THAT SHE WAS TAKEN OFF OF BCP.  NO RESIDUAL NOTED   • Wears glasses     FOR READING       Allergies:    Bactrim [sulfamethoxazole-trimethoprim] and Sulfa antibiotics      Past Surgical History:   Procedure Laterality Date   • BREAST AUGMENTATION     • COLONOSCOPY     • DILATION AND CURETTAGE, DIAGNOSTIC / THERAPEUTIC     • ENDOSCOPY     • INGUINAL HERNIA REPAIR Right 11/20/2017    Procedure: REPAIR OF INCARCERATED RIGHT FEMORAL HERNIA, PRIMARY REPAIR OF BLADDER;  Surgeon: Jennifer Torrez MD;  Location: [facility];  Service:    • SINUS SURGERY     • WISDOM TOOTH EXTRACTION   " "        Social History     Socioeconomic History   • Marital status:    Tobacco Use   • Smoking status: Never   • Smokeless tobacco: Never   Substance and Sexual Activity   • Alcohol use: Yes     Comment: SOCIAL USE ONLY, NO HX OF ABUSE REPORTED   • Drug use: No   • Sexual activity: Defer         Family History   Problem Relation Age of Onset   • Hypertension Father    • Cancer Maternal Aunt    • Diabetes Paternal Uncle        Objective  Physical Exam:  /73   Pulse 112   Temp 99.2 °F (37.3 °C)   Resp 20   Ht 160 cm (63\")   Wt 68 kg (150 lb)   SpO2 97%   BMI 26.57 kg/m²      Physical Exam    CONSTITUTIONAL: Well developed, 42-year-old female, increased work of breathing  VITAL SIGNS: per nursing, reviewed and noted  SKIN: exposed skin with no rashes, ulcerations or petechiae  EYES: Grossly EOMI, no icterus  ENT: Normal voice.  No posterior pharyngeal erythema or exudate moist mucous membranes RESPIRATORY: Some increased work of breathing and diffuse wheezes  CARDIOVASCULAR:  regular rate and rhythm, no murmurs.  Good Peripheral pulses. Good cap refill to extremities.   GI: Abdomen soft, nontender, normal bowel sounds. No hernia. No ascites.  MUSCULOSKELETAL:  No tenderness. Full ROM. Strength and tone grossly normal.  no spasms. no neck or back tenderness or spasm.   NEUROLOGIC: Alert, oriented x 3. No gross deficits. GCS 15.   PSYCH: appropriate affect.      Procedures  No attending physician procedures were performed on this patient.  ED Course:             Lab Results (last 24 hours)     ** No results found for the last 24 hours. **           No radiology results from the last 24 hrs       MDM    Initial impression of presenting illness: Patient presents with asthma exacerbation short of breath and wheezing    DDX: includes but is not limited to: Asthma exacerbation, acute bronchitis    Patient arrives with some increased work of breathing normal oxygen saturations 97% room air, tachycardic " afebrile normotensive with vitals interpreted by myself.     Pertinent features from physical exam: Some increased work of breathing and wheezing.    Initial diagnostic plan: Solu-Medrol magnesium nebs      Diagnostic information from other sources: None    Interventions / Re-evaluation: Continued wheezing with some improvement after initial interventions, added nebulized magnesium 1 g    Results/clinical rationale were discussed with patient    Consultations/Discussion of results with other physicians: None    Disposition plan: Patient was feeling better after additional ER interventions, was able to ambulate to the bathroom, better depth respiration less wheezing.  Mild residual wheezing.  Patient felt stable for discharge will add prescription prednisone.  Patient does not require any refills on her home meds  -----    Final diagnoses:   Exacerbation of asthma, unspecified asthma severity, unspecified whether persistent        Robert Fowler,   01/14/23 0505

## 2023-02-05 ENCOUNTER — HOSPITAL ENCOUNTER (EMERGENCY)
Facility: HOSPITAL | Age: 43
Discharge: HOME | End: 2023-02-05
Attending: EMERGENCY MEDICINE | Admitting: EMERGENCY MEDICINE
Payer: COMMERCIAL

## 2023-02-05 ENCOUNTER — APPOINTMENT (OUTPATIENT)
Dept: GENERAL RADIOLOGY | Facility: HOSPITAL | Age: 43
End: 2023-02-05
Payer: COMMERCIAL

## 2023-02-05 VITALS
SYSTOLIC BLOOD PRESSURE: 119 MMHG | HEIGHT: 63 IN | HEART RATE: 97 BPM | RESPIRATION RATE: 19 BRPM | DIASTOLIC BLOOD PRESSURE: 71 MMHG | TEMPERATURE: 98.9 F | WEIGHT: 150 LBS | OXYGEN SATURATION: 94 % | BODY MASS INDEX: 26.58 KG/M2

## 2023-02-05 DIAGNOSIS — J45.901 MILD ASTHMA WITH EXACERBATION, UNSPECIFIED WHETHER PERSISTENT: Primary | ICD-10-CM

## 2023-02-05 LAB
ALBUMIN SERPL-MCNC: 4.1 G/DL (ref 3.5–5.2)
ALBUMIN/GLOB SERPL: 1.6 G/DL
ALP SERPL-CCNC: 96 U/L (ref 39–117)
ALT SERPL W P-5'-P-CCNC: 26 U/L (ref 1–33)
ANION GAP SERPL CALCULATED.3IONS-SCNC: 14.5 MMOL/L (ref 5–15)
AST SERPL-CCNC: 25 U/L (ref 1–32)
BASOPHILS # BLD AUTO: 0.02 10*3/MM3 (ref 0–0.2)
BASOPHILS NFR BLD AUTO: 0.2 % (ref 0–1.5)
BILIRUB SERPL-MCNC: 0.2 MG/DL (ref 0–1.2)
BUN SERPL-MCNC: 12 MG/DL (ref 6–20)
BUN/CREAT SERPL: 11.7 (ref 7–25)
CALCIUM SPEC-SCNC: 9.5 MG/DL (ref 8.6–10.5)
CHLORIDE SERPL-SCNC: 104 MMOL/L (ref 98–107)
CO2 SERPL-SCNC: 21.5 MMOL/L (ref 22–29)
CREAT SERPL-MCNC: 1.03 MG/DL (ref 0.57–1)
DEPRECATED RDW RBC AUTO: 39.9 FL (ref 37–54)
EGFRCR SERPLBLD CKD-EPI 2021: 69.8 ML/MIN/1.73
EOSINOPHIL # BLD AUTO: 0.01 10*3/MM3 (ref 0–0.4)
EOSINOPHIL NFR BLD AUTO: 0.1 % (ref 0.3–6.2)
ERYTHROCYTE [DISTWIDTH] IN BLOOD BY AUTOMATED COUNT: 13.9 % (ref 12.3–15.4)
FLUAV RNA RESP QL NAA+PROBE: NOT DETECTED
FLUBV RNA RESP QL NAA+PROBE: NOT DETECTED
GLOBULIN UR ELPH-MCNC: 2.6 GM/DL
GLUCOSE SERPL-MCNC: 268 MG/DL (ref 65–99)
HCT VFR BLD AUTO: 40.9 % (ref 34–46.6)
HGB BLD-MCNC: 13.9 G/DL (ref 12–15.9)
HOLD SPECIMEN: NORMAL
HOLD SPECIMEN: NORMAL
IMM GRANULOCYTES # BLD AUTO: 0.06 10*3/MM3 (ref 0–0.05)
IMM GRANULOCYTES NFR BLD AUTO: 0.5 % (ref 0–0.5)
LYMPHOCYTES # BLD AUTO: 0.32 10*3/MM3 (ref 0.7–3.1)
LYMPHOCYTES NFR BLD AUTO: 2.8 % (ref 19.6–45.3)
MCH RBC QN AUTO: 26.9 PG (ref 26.6–33)
MCHC RBC AUTO-ENTMCNC: 34 G/DL (ref 31.5–35.7)
MCV RBC AUTO: 79.1 FL (ref 79–97)
MONOCYTES # BLD AUTO: 0.09 10*3/MM3 (ref 0.1–0.9)
MONOCYTES NFR BLD AUTO: 0.8 % (ref 5–12)
NEUTROPHILS NFR BLD AUTO: 10.91 10*3/MM3 (ref 1.7–7)
NEUTROPHILS NFR BLD AUTO: 95.6 % (ref 42.7–76)
NRBC BLD AUTO-RTO: 0 /100 WBC (ref 0–0.2)
NT-PROBNP SERPL-MCNC: 152.7 PG/ML (ref 0–450)
PLATELET # BLD AUTO: 351 10*3/MM3 (ref 140–450)
PMV BLD AUTO: 9.1 FL (ref 6–12)
POTASSIUM SERPL-SCNC: 3.6 MMOL/L (ref 3.5–5.2)
PROT SERPL-MCNC: 6.7 G/DL (ref 6–8.5)
RBC # BLD AUTO: 5.17 10*6/MM3 (ref 3.77–5.28)
SARS-COV-2 RNA RESP QL NAA+PROBE: NOT DETECTED
SODIUM SERPL-SCNC: 140 MMOL/L (ref 136–145)
TROPONIN T SERPL-MCNC: <0.01 NG/ML (ref 0–0.03)
WBC NRBC COR # BLD: 11.41 10*3/MM3 (ref 3.4–10.8)
WHOLE BLOOD HOLD COAG: NORMAL
WHOLE BLOOD HOLD SPECIMEN: NORMAL

## 2023-02-05 PROCEDURE — 94640 AIRWAY INHALATION TREATMENT: CPT

## 2023-02-05 PROCEDURE — 85025 COMPLETE CBC W/AUTO DIFF WBC: CPT | Performed by: EMERGENCY MEDICINE

## 2023-02-05 PROCEDURE — 80053 COMPREHEN METABOLIC PANEL: CPT | Performed by: EMERGENCY MEDICINE

## 2023-02-05 PROCEDURE — 96375 TX/PRO/DX INJ NEW DRUG ADDON: CPT

## 2023-02-05 PROCEDURE — 87636 SARSCOV2 & INF A&B AMP PRB: CPT | Performed by: EMERGENCY MEDICINE

## 2023-02-05 PROCEDURE — 83880 ASSAY OF NATRIURETIC PEPTIDE: CPT | Performed by: EMERGENCY MEDICINE

## 2023-02-05 PROCEDURE — 84484 ASSAY OF TROPONIN QUANT: CPT | Performed by: EMERGENCY MEDICINE

## 2023-02-05 PROCEDURE — 96365 THER/PROPH/DIAG IV INF INIT: CPT

## 2023-02-05 PROCEDURE — 93005 ELECTROCARDIOGRAM TRACING: CPT | Performed by: EMERGENCY MEDICINE

## 2023-02-05 PROCEDURE — 96366 THER/PROPH/DIAG IV INF ADDON: CPT

## 2023-02-05 PROCEDURE — 71045 X-RAY EXAM CHEST 1 VIEW: CPT

## 2023-02-05 PROCEDURE — 25010000002 METHYLPREDNISOLONE PER 125 MG: Performed by: PHYSICIAN ASSISTANT

## 2023-02-05 PROCEDURE — 25010000002 MAGNESIUM SULFATE 2 GM/50ML SOLUTION: Performed by: PHYSICIAN ASSISTANT

## 2023-02-05 PROCEDURE — 99283 EMERGENCY DEPT VISIT LOW MDM: CPT

## 2023-02-05 RX ORDER — METHYLPREDNISOLONE SODIUM SUCCINATE 125 MG/2ML
125 INJECTION, POWDER, LYOPHILIZED, FOR SOLUTION INTRAMUSCULAR; INTRAVENOUS ONCE
Status: COMPLETED | OUTPATIENT
Start: 2023-02-05 | End: 2023-02-05

## 2023-02-05 RX ORDER — BENZONATATE 200 MG/1
200 CAPSULE ORAL 3 TIMES DAILY PRN
Qty: 30 CAPSULE | Refills: 0 | Status: SHIPPED | OUTPATIENT
Start: 2023-02-05

## 2023-02-05 RX ORDER — DEXTROMETHORPHAN HYDROBROMIDE AND PROMETHAZINE HYDROCHLORIDE 15; 6.25 MG/5ML; MG/5ML
5 SYRUP ORAL 4 TIMES DAILY PRN
Qty: 120 ML | Refills: 0 | Status: SHIPPED | OUTPATIENT
Start: 2023-02-05

## 2023-02-05 RX ORDER — AZITHROMYCIN 250 MG/1
250 TABLET, FILM COATED ORAL DAILY
Qty: 4 TABLET | Refills: 0 | Status: SHIPPED | OUTPATIENT
Start: 2023-02-06 | End: 2023-02-10

## 2023-02-05 RX ORDER — SODIUM CHLORIDE 0.9 % (FLUSH) 0.9 %
10 SYRINGE (ML) INJECTION AS NEEDED
Status: DISCONTINUED | OUTPATIENT
Start: 2023-02-05 | End: 2023-02-05 | Stop reason: HOSPADM

## 2023-02-05 RX ORDER — BENZONATATE 100 MG/1
200 CAPSULE ORAL ONCE
Status: COMPLETED | OUTPATIENT
Start: 2023-02-05 | End: 2023-02-05

## 2023-02-05 RX ORDER — AZITHROMYCIN 250 MG/1
500 TABLET, FILM COATED ORAL ONCE
Status: COMPLETED | OUTPATIENT
Start: 2023-02-05 | End: 2023-02-05

## 2023-02-05 RX ORDER — IPRATROPIUM BROMIDE AND ALBUTEROL SULFATE 2.5; .5 MG/3ML; MG/3ML
3 SOLUTION RESPIRATORY (INHALATION) ONCE
Status: COMPLETED | OUTPATIENT
Start: 2023-02-05 | End: 2023-02-05

## 2023-02-05 RX ORDER — MAGNESIUM SULFATE HEPTAHYDRATE 40 MG/ML
2 INJECTION, SOLUTION INTRAVENOUS ONCE
Status: COMPLETED | OUTPATIENT
Start: 2023-02-05 | End: 2023-02-05

## 2023-02-05 RX ADMIN — IPRATROPIUM BROMIDE AND ALBUTEROL SULFATE 3 ML: .5; 3 SOLUTION RESPIRATORY (INHALATION) at 17:54

## 2023-02-05 RX ADMIN — AZITHROMYCIN MONOHYDRATE 500 MG: 250 TABLET ORAL at 20:08

## 2023-02-05 RX ADMIN — MAGNESIUM SULFATE HEPTAHYDRATE 2 G: 40 INJECTION, SOLUTION INTRAVENOUS at 17:47

## 2023-02-05 RX ADMIN — METHYLPREDNISOLONE SODIUM SUCCINATE 125 MG: 125 INJECTION, POWDER, FOR SOLUTION INTRAMUSCULAR; INTRAVENOUS at 17:48

## 2023-02-05 RX ADMIN — BENZONATATE 200 MG: 100 CAPSULE ORAL at 20:09

## 2023-02-05 NOTE — ED PROVIDER NOTES
"Subjective  History of Present Illness:    Chief Complaint:   Chief Complaint   Patient presents with   • Shortness of Breath   • Cough   • Wheezing      History of Present Illness: Marlin Silva is a 42 y.o. female who presents to the emergency department complaining of cough, shortness of breath.  History of asthma uses rescue inhaler steroid inhaler nebulizers currently on 60 mg of prednisone daily and has been for over a week and a half.  Patient states has follow-up with PCP Wednesday but started having increasing shortness of breath today.  Productive cough with sputum no blood.  Has had to be admitted for asthma before.  Does not smoke or vape.  Onset: 3 to 4 days ago  Duration: Ongoing, worsening  Exacerbating / Alleviating factors: None  Associated symptoms: None      Nurses Notes reviewed and agree, including vitals, allergies, social history and prior medical history.     Review of Systems    Past Medical History:   Diagnosis Date   • Arthritis    • Asthma    • Depression    • Dysphagia     REPORTS \"IT'S LIKE THINGS WON'T GO DOWN SOMETIMES\"   • Fibromyalgia    • GERD (gastroesophageal reflux disease)     PATIENT REPORTS HAD ER VISIT IN DECEMBER OF 2016 FOR RIGHT SIDED PAIN.  PATIENT THOUGHT MAY BE HEART RELATED BUT REPORTS WAS TOLD ONLY REFLUX.   • History of bronchitis    • History of kidney stones     REPORTS PASSED WITHOUT SURGICAL INTERVENTION IN SUMMER OF 2017   • History of panic attacks    • History of pneumonia    • Insomnia    • MRSA (methicillin resistant Staphylococcus aureus) 2008    REPORTS IN SINUS CAVITY AND THAT SHE WAS TREATED   • Neck pain    • TIA (transient ischemic attack)     REPORTS HX OF TIA 8 YEARS AGO AND THAT SHE WAS TAKEN OFF OF BCP.  NO RESIDUAL NOTED   • Wears glasses     FOR READING       Allergies:    Bactrim [sulfamethoxazole-trimethoprim] and Sulfa antibiotics      Past Surgical History:   Procedure Laterality Date   • BREAST AUGMENTATION     • COLONOSCOPY     • " "DILATION AND CURETTAGE, DIAGNOSTIC / THERAPEUTIC     • ENDOSCOPY     • INGUINAL HERNIA REPAIR Right 11/20/2017    Procedure: REPAIR OF INCARCERATED RIGHT FEMORAL HERNIA, PRIMARY REPAIR OF BLADDER;  Surgeon: Jennifer Torrez MD;  Location: [facility];  Service:    • SINUS SURGERY     • WISDOM TOOTH EXTRACTION           Social History     Socioeconomic History   • Marital status:    Tobacco Use   • Smoking status: Never   • Smokeless tobacco: Never   Substance and Sexual Activity   • Alcohol use: Yes     Comment: SOCIAL USE ONLY, NO HX OF ABUSE REPORTED   • Drug use: No   • Sexual activity: Defer         Family History   Problem Relation Age of Onset   • Hypertension Father    • Cancer Maternal Aunt    • Diabetes Paternal Uncle        Objective  Physical Exam:  /70 (BP Location: Left arm, Patient Position: Sitting)   Pulse 119   Temp 98.9 °F (37.2 °C) (Oral)   Resp 19   Ht 160 cm (63\")   Wt 68 kg (150 lb)   LMP 02/04/2023   SpO2 96%   BMI 26.57 kg/m²      Physical Exam  Vitals and nursing note reviewed.   Constitutional:       General: She is not in acute distress.     Appearance: She is well-developed and normal weight. She is not ill-appearing, toxic-appearing or diaphoretic.   HENT:      Head: Normocephalic and atraumatic.   Eyes:      Extraocular Movements: Extraocular movements intact.   Cardiovascular:      Rate and Rhythm: Regular rhythm. Tachycardia present.      Heart sounds: Normal heart sounds.   Pulmonary:      Effort: No respiratory distress.      Breath sounds: Wheezing present.   Abdominal:      Palpations: Abdomen is soft.   Musculoskeletal:         General: Normal range of motion.      Cervical back: Normal range of motion.   Skin:     General: Skin is warm and dry.   Neurological:      General: No focal deficit present.      Mental Status: She is alert.   Psychiatric:         Mood and Affect: Mood normal.         Behavior: Behavior normal.           Procedures    ED Course:    ED " Course as of 02/05/23 1951   Sun Feb 05, 2023   1800 EKG interpreted by me reveals sinus tachycardia with rate of 120 bpm.  There are nonspecific ST and T wave changes.  This is an abnormal appearing EKG. [TB]      ED Course User Index  [TB] Chelly Amanda MD       Lab Results (last 24 hours)     Procedure Component Value Units Date/Time    CBC & Differential [[id number]]  (Abnormal) Collected: 02/05/23 1722    Specimen: Blood Updated: 02/05/23 1728    Narrative:      The following orders were created for panel order CBC & Differential.  Procedure                               Abnormality         Status                     ---------                               -----------         ------                     CBC Auto Differential[[id number]]        Abnormal            Final result                 Please view results for these tests on the individual orders.    Comprehensive Metabolic Panel [[id number]]  (Abnormal) Collected: 02/05/23 1722    Specimen: Blood Updated: 02/05/23 1802     Glucose 268 mg/dL      Comment: Glucose >180, Hemoglobin A1C recommended.        BUN 12 mg/dL      Creatinine 1.03 mg/dL      Sodium 140 mmol/L      Potassium 3.6 mmol/L      Chloride 104 mmol/L      CO2 21.5 mmol/L      Calcium 9.5 mg/dL      Total Protein 6.7 g/dL      Albumin 4.1 g/dL      ALT (SGPT) 26 U/L      AST (SGOT) 25 U/L      Alkaline Phosphatase 96 U/L      Total Bilirubin 0.2 mg/dL      Globulin 2.6 gm/dL      A/G Ratio 1.6 g/dL      BUN/Creatinine Ratio 11.7     Anion Gap 14.5 mmol/L      eGFR 69.8 mL/min/1.73     Narrative:      GFR Normal >60  Chronic Kidney Disease <60  Kidney Failure <15      BNP [[id number]]  (Normal) Collected: 02/05/23 1722    Specimen: Blood Updated: 02/05/23 1804     proBNP 152.7 pg/mL     Narrative:      Among patients with dyspnea, NT-proBNP is highly sensitive for the detection of acute congestive heart failure. In addition NT-proBNP of <300 pg/ml effectively rules out acute congestive  heart failure with 99% negative predictive value.    Results may be falsely decreased if patient taking Biotin.      Troponin [[id number]]  (Normal) Collected: 02/05/23 1722    Specimen: Blood Updated: 02/05/23 1804     Troponin T <0.010 ng/mL     Narrative:      Troponin T Reference Range:  <= 0.03 ng/mL-   Negative for AMI  >0.03 ng/mL-     Abnormal for myocardial necrosis.  Clinicians would have to utilize clinical acumen, EKG, Troponin and serial changes to determine if it is an Acute Myocardial Infarction or myocardial injury due to an underlying chronic condition.       Results may be falsely decreased if patient taking Biotin.      COVID-19 and FLU A/B PCR - Swab, Nasopharynx [[id number]]  (Normal) Collected: 02/05/23 1722    Specimen: Swab from Nasopharynx Updated: 02/05/23 1748     COVID19 Not Detected     Influenza A PCR Not Detected     Influenza B PCR Not Detected    Narrative:      Fact sheet for providers: https://www.fda.gov/media/658313/download    Fact sheet for patients: https://www.fda.gov/media/965636/download    Test performed by PCR.    CBC Auto Differential [[id number]]  (Abnormal) Collected: 02/05/23 1722    Specimen: Blood Updated: 02/05/23 1728     WBC 11.41 10*3/mm3      RBC 5.17 10*6/mm3      Hemoglobin 13.9 g/dL      Hematocrit 40.9 %      MCV 79.1 fL      MCH 26.9 pg      MCHC 34.0 g/dL      RDW 13.9 %      RDW-SD 39.9 fl      MPV 9.1 fL      Platelets 351 10*3/mm3      Neutrophil % 95.6 %      Lymphocyte % 2.8 %      Monocyte % 0.8 %      Eosinophil % 0.1 %      Basophil % 0.2 %      Immature Grans % 0.5 %      Neutrophils, Absolute 10.91 10*3/mm3      Lymphocytes, Absolute 0.32 10*3/mm3      Monocytes, Absolute 0.09 10*3/mm3      Eosinophils, Absolute 0.01 10*3/mm3      Basophils, Absolute 0.02 10*3/mm3      Immature Grans, Absolute 0.06 10*3/mm3      nRBC 0.0 /100 WBC            No radiology results from the last 24 hrs       [facility]    1936  Patient was ambulated through the ED oxygen did  not drop below 96% on room air.  Patient states she still feels as though she is short of breath although heart rate 96 bpm normal sinus at this time.  1937 hrs.    Marlin Silva is a 42 y.o. female who presents to the emergency department for evaluation of shortness of breath    Differential diagnosis includes asthma exacerbation, pneumonia, COVID among other etiologies.    CBC, CMP, troponin, COVID-19 swab, BNP, EKG, chest x-ray ordered for further evaluation of the patient's presentation.    Chart review including any outside testing was negative for any acute abnormality    Patient was treated with magnesium, Solu-Medrol, DuoNeb    Patient was able to ambulate without any drop in saturations.  Heart rate in the 90s.  Discussed D-dimer but low concern for PE given asthma history and presentation.  She has not followed with pulmonology for years.  Takes Flovent at home we will have her follow-up for reevaluation of maintenance therapies    Plan for disposition is home with pulmonology follow-up.  Patient/family comfortable with and understanding of the plan.      Final diagnoses:   Mild asthma with exacerbation, unspecified whether persistent        Obed Verma PA-C  02/05/23 1951

## 2023-03-02 ENCOUNTER — APPOINTMENT (OUTPATIENT)
Dept: GENERAL RADIOLOGY | Facility: HOSPITAL | Age: 43
End: 2023-03-02
Payer: COMMERCIAL

## 2023-03-02 ENCOUNTER — HOSPITAL ENCOUNTER (EMERGENCY)
Facility: HOSPITAL | Age: 43
Discharge: HOME | End: 2023-03-03
Attending: EMERGENCY MEDICINE | Admitting: EMERGENCY MEDICINE
Payer: COMMERCIAL

## 2023-03-02 DIAGNOSIS — J45.901 EXACERBATION OF ASTHMA, UNSPECIFIED ASTHMA SEVERITY, UNSPECIFIED WHETHER PERSISTENT: Primary | ICD-10-CM

## 2023-03-02 LAB
ALBUMIN SERPL-MCNC: 4 G/DL (ref 3.5–5.2)
ALBUMIN/GLOB SERPL: 1.5 G/DL
ALP SERPL-CCNC: 103 U/L (ref 39–117)
ALT SERPL W P-5'-P-CCNC: 24 U/L (ref 1–33)
ANION GAP SERPL CALCULATED.3IONS-SCNC: 9 MMOL/L (ref 5–15)
AST SERPL-CCNC: 31 U/L (ref 1–32)
BASOPHILS # BLD AUTO: 0.12 10*3/MM3 (ref 0–0.2)
BASOPHILS NFR BLD AUTO: 1.2 % (ref 0–1.5)
BILIRUB SERPL-MCNC: 0.3 MG/DL (ref 0–1.2)
BUN SERPL-MCNC: 13 MG/DL (ref 6–20)
BUN/CREAT SERPL: 14.1 (ref 7–25)
CALCIUM SPEC-SCNC: 9 MG/DL (ref 8.6–10.5)
CHLORIDE SERPL-SCNC: 103 MMOL/L (ref 98–107)
CO2 SERPL-SCNC: 29 MMOL/L (ref 22–29)
CREAT SERPL-MCNC: 0.92 MG/DL (ref 0.57–1)
DEPRECATED RDW RBC AUTO: 41.6 FL (ref 37–54)
EGFRCR SERPLBLD CKD-EPI 2021: 79.9 ML/MIN/1.73
EOSINOPHIL # BLD AUTO: 1.48 10*3/MM3 (ref 0–0.4)
EOSINOPHIL NFR BLD AUTO: 15.1 % (ref 0.3–6.2)
ERYTHROCYTE [DISTWIDTH] IN BLOOD BY AUTOMATED COUNT: 14.2 % (ref 12.3–15.4)
GLOBULIN UR ELPH-MCNC: 2.7 GM/DL
GLUCOSE SERPL-MCNC: 138 MG/DL (ref 65–99)
HCT VFR BLD AUTO: 40.7 % (ref 34–46.6)
HGB BLD-MCNC: 13.4 G/DL (ref 12–15.9)
HOLD SPECIMEN: NORMAL
HOLD SPECIMEN: NORMAL
IMM GRANULOCYTES # BLD AUTO: 0.05 10*3/MM3 (ref 0–0.05)
IMM GRANULOCYTES NFR BLD AUTO: 0.5 % (ref 0–0.5)
LYMPHOCYTES # BLD AUTO: 2.66 10*3/MM3 (ref 0.7–3.1)
LYMPHOCYTES NFR BLD AUTO: 27.1 % (ref 19.6–45.3)
MCH RBC QN AUTO: 26.9 PG (ref 26.6–33)
MCHC RBC AUTO-ENTMCNC: 32.9 G/DL (ref 31.5–35.7)
MCV RBC AUTO: 81.6 FL (ref 79–97)
MONOCYTES # BLD AUTO: 0.64 10*3/MM3 (ref 0.1–0.9)
MONOCYTES NFR BLD AUTO: 6.5 % (ref 5–12)
NEUTROPHILS NFR BLD AUTO: 4.88 10*3/MM3 (ref 1.7–7)
NEUTROPHILS NFR BLD AUTO: 49.6 % (ref 42.7–76)
NRBC BLD AUTO-RTO: 0 /100 WBC (ref 0–0.2)
NT-PROBNP SERPL-MCNC: 64.6 PG/ML (ref 0–450)
PLATELET # BLD AUTO: 317 10*3/MM3 (ref 140–450)
PMV BLD AUTO: 9.5 FL (ref 6–12)
POTASSIUM SERPL-SCNC: 3.8 MMOL/L (ref 3.5–5.2)
PROT SERPL-MCNC: 6.7 G/DL (ref 6–8.5)
RBC # BLD AUTO: 4.99 10*6/MM3 (ref 3.77–5.28)
SODIUM SERPL-SCNC: 141 MMOL/L (ref 136–145)
TROPONIN T SERPL HS-MCNC: 6 NG/L
WBC NRBC COR # BLD: 9.83 10*3/MM3 (ref 3.4–10.8)
WHOLE BLOOD HOLD COAG: NORMAL
WHOLE BLOOD HOLD SPECIMEN: NORMAL

## 2023-03-02 PROCEDURE — 84484 ASSAY OF TROPONIN QUANT: CPT | Performed by: EMERGENCY MEDICINE

## 2023-03-02 PROCEDURE — 96365 THER/PROPH/DIAG IV INF INIT: CPT

## 2023-03-02 PROCEDURE — 94760 N-INVAS EAR/PLS OXIMETRY 1: CPT

## 2023-03-02 PROCEDURE — 85025 COMPLETE CBC W/AUTO DIFF WBC: CPT | Performed by: EMERGENCY MEDICINE

## 2023-03-02 PROCEDURE — 96366 THER/PROPH/DIAG IV INF ADDON: CPT

## 2023-03-02 PROCEDURE — 94799 UNLISTED PULMONARY SVC/PX: CPT

## 2023-03-02 PROCEDURE — 93005 ELECTROCARDIOGRAM TRACING: CPT | Performed by: EMERGENCY MEDICINE

## 2023-03-02 PROCEDURE — 25010000002 MAGNESIUM SULFATE PER 500 MG OF MAGNESIUM: Performed by: EMERGENCY MEDICINE

## 2023-03-02 PROCEDURE — 25010000002 KETOROLAC TROMETHAMINE PER 15 MG: Performed by: EMERGENCY MEDICINE

## 2023-03-02 PROCEDURE — 96375 TX/PRO/DX INJ NEW DRUG ADDON: CPT

## 2023-03-02 PROCEDURE — 80053 COMPREHEN METABOLIC PANEL: CPT | Performed by: EMERGENCY MEDICINE

## 2023-03-02 PROCEDURE — 25010000002 MAGNESIUM SULFATE 2 GM/50ML SOLUTION: Performed by: EMERGENCY MEDICINE

## 2023-03-02 PROCEDURE — 71045 X-RAY EXAM CHEST 1 VIEW: CPT

## 2023-03-02 PROCEDURE — 94640 AIRWAY INHALATION TREATMENT: CPT

## 2023-03-02 PROCEDURE — 99284 EMERGENCY DEPT VISIT MOD MDM: CPT

## 2023-03-02 PROCEDURE — 83880 ASSAY OF NATRIURETIC PEPTIDE: CPT | Performed by: EMERGENCY MEDICINE

## 2023-03-02 PROCEDURE — 96372 THER/PROPH/DIAG INJ SC/IM: CPT

## 2023-03-02 RX ORDER — MAGNESIUM SULFATE HEPTAHYDRATE 500 MG/ML
1 INJECTION, SOLUTION INTRAMUSCULAR; INTRAVENOUS ONCE
Status: COMPLETED | OUTPATIENT
Start: 2023-03-02 | End: 2023-03-02

## 2023-03-02 RX ORDER — SODIUM CHLORIDE 0.9 % (FLUSH) 0.9 %
10 SYRINGE (ML) INJECTION AS NEEDED
Status: DISCONTINUED | OUTPATIENT
Start: 2023-03-02 | End: 2023-03-03 | Stop reason: HOSPADM

## 2023-03-02 RX ORDER — MAGNESIUM SULFATE 1 G/100ML
1 INJECTION INTRAVENOUS ONCE
Status: DISCONTINUED | OUTPATIENT
Start: 2023-03-02 | End: 2023-03-02 | Stop reason: ALTCHOICE

## 2023-03-02 RX ORDER — IPRATROPIUM BROMIDE AND ALBUTEROL SULFATE 2.5; .5 MG/3ML; MG/3ML
3 SOLUTION RESPIRATORY (INHALATION) ONCE
Status: COMPLETED | OUTPATIENT
Start: 2023-03-02 | End: 2023-03-02

## 2023-03-02 RX ORDER — KETOROLAC TROMETHAMINE 30 MG/ML
30 INJECTION, SOLUTION INTRAMUSCULAR; INTRAVENOUS ONCE
Status: COMPLETED | OUTPATIENT
Start: 2023-03-02 | End: 2023-03-02

## 2023-03-02 RX ORDER — MAGNESIUM SULFATE HEPTAHYDRATE 40 MG/ML
2 INJECTION, SOLUTION INTRAVENOUS ONCE
Status: COMPLETED | OUTPATIENT
Start: 2023-03-02 | End: 2023-03-03

## 2023-03-02 RX ADMIN — MAGNESIUM SULFATE HEPTAHYDRATE 1 G: 500 INJECTION, SOLUTION INTRAMUSCULAR; INTRAVENOUS at 22:03

## 2023-03-02 RX ADMIN — IPRATROPIUM BROMIDE AND ALBUTEROL SULFATE 3 ML: .5; 3 SOLUTION RESPIRATORY (INHALATION) at 21:55

## 2023-03-02 RX ADMIN — KETOROLAC TROMETHAMINE 30 MG: 30 INJECTION, SOLUTION INTRAMUSCULAR; INTRAVENOUS at 23:22

## 2023-03-02 RX ADMIN — MAGNESIUM SULFATE HEPTAHYDRATE 2 G: 40 INJECTION, SOLUTION INTRAVENOUS at 21:54

## 2023-03-03 VITALS
BODY MASS INDEX: 24.99 KG/M2 | OXYGEN SATURATION: 95 % | WEIGHT: 150 LBS | HEART RATE: 83 BPM | DIASTOLIC BLOOD PRESSURE: 64 MMHG | RESPIRATION RATE: 21 BRPM | SYSTOLIC BLOOD PRESSURE: 115 MMHG | HEIGHT: 65 IN | TEMPERATURE: 98.4 F

## 2023-03-03 PROCEDURE — 94799 UNLISTED PULMONARY SVC/PX: CPT

## 2023-03-03 PROCEDURE — 94760 N-INVAS EAR/PLS OXIMETRY 1: CPT

## 2023-03-03 RX ORDER — IPRATROPIUM BROMIDE AND ALBUTEROL SULFATE 2.5; .5 MG/3ML; MG/3ML
3 SOLUTION RESPIRATORY (INHALATION) ONCE
Status: COMPLETED | OUTPATIENT
Start: 2023-03-03 | End: 2023-03-03

## 2023-03-03 RX ORDER — PREDNISONE 20 MG/1
TABLET ORAL
Qty: 10 TABLET | Refills: 0 | Status: SHIPPED | OUTPATIENT
Start: 2023-03-03

## 2023-03-03 RX ADMIN — IPRATROPIUM BROMIDE AND ALBUTEROL SULFATE 3 ML: .5; 3 SOLUTION RESPIRATORY (INHALATION) at 00:57

## 2023-03-09 NOTE — ED PROVIDER NOTES
"Subjective  History of Present Illness:    Chief Complaint: Short of breath, wheezing  History of Present Illness: 42-year-old female history of asthma presents with shortness of breath began 30 minutes prior.  Received epinephrine, Solu-Medrol, 3 nebs in route  Nurses Notes reviewed and agree, including vitals, allergies, social history and prior medical history.     REVIEW OF SYSTEMS: All systems reviewed and not pertinent unless noted.  Review of Systems      Positive for: Short of breath wheezing history of asthma    Negative for: Fever hemoptysis cough syncope chest pain palpitation    Past Medical History:   Diagnosis Date   • Arthritis    • Asthma    • Depression    • Dysphagia     REPORTS \"IT'S LIKE THINGS WON'T GO DOWN SOMETIMES\"   • Fibromyalgia    • GERD (gastroesophageal reflux disease)     PATIENT REPORTS HAD ER VISIT IN DECEMBER OF 2016 FOR RIGHT SIDED PAIN.  PATIENT THOUGHT MAY BE HEART RELATED BUT REPORTS WAS TOLD ONLY REFLUX.   • History of bronchitis    • History of kidney stones     REPORTS PASSED WITHOUT SURGICAL INTERVENTION IN SUMMER OF 2017   • History of panic attacks    • History of pneumonia    • Insomnia    • MRSA (methicillin resistant Staphylococcus aureus) 2008    REPORTS IN SINUS CAVITY AND THAT SHE WAS TREATED   • Neck pain    • TIA (transient ischemic attack)     REPORTS HX OF TIA 8 YEARS AGO AND THAT SHE WAS TAKEN OFF OF BCP.  NO RESIDUAL NOTED   • Wears glasses     FOR READING       Allergies:    Bactrim [sulfamethoxazole-trimethoprim] and Sulfa antibiotics      Past Surgical History:   Procedure Laterality Date   • BREAST AUGMENTATION     • COLONOSCOPY     • DILATION AND CURETTAGE, DIAGNOSTIC / THERAPEUTIC     • ENDOSCOPY     • INGUINAL HERNIA REPAIR Right 11/20/2017    Procedure: REPAIR OF INCARCERATED RIGHT FEMORAL HERNIA, PRIMARY REPAIR OF BLADDER;  Surgeon: Jennifer Torrez MD;  Location: [facility];  Service:    • SINUS SURGERY     • WISDOM TOOTH EXTRACTION           Social " "History     Socioeconomic History   • Marital status:    Tobacco Use   • Smoking status: Never   • Smokeless tobacco: Never   Substance and Sexual Activity   • Alcohol use: Not Currently     Comment: SOCIAL USE ONLY, NO HX OF ABUSE REPORTED   • Drug use: No   • Sexual activity: Defer         Family History   Problem Relation Age of Onset   • Hypertension Father    • Cancer Maternal Aunt    • Diabetes Paternal Uncle        Objective  Physical Exam:  /64   Pulse 83   Temp 98.4 °F (36.9 °C) (Axillary)   Resp 21   Ht 165.1 cm (65\")   Wt 68 kg (150 lb)   LMP 02/26/2023 (Exact Date)   SpO2 95%   BMI 24.96 kg/m²      Physical Exam    CONSTITUTIONAL: Well developed, 42-year-old female,  in mild expiratory wheezes and increased work of breathing.  VITAL SIGNS: per nursing, reviewed and noted  SKIN: exposed skin with no rashes, ulcerations or petechiae  EYES: Grossly EOMI, no icterus  ENT: Normal voice.  Patient maintained wearing a mask throughout patient encounter due to coronavirus pandemic  RESPIRATORY:  No increased work of breathing. No retractions.  Mild increased work of breathing and diffuse wheezes  CARDIOVASCULAR:  regular rate and rhythm, no murmurs.  Good Peripheral pulses. Good cap refill to extremities.   MUSCULOSKELETAL: Age-appropriate bulk and tone, moves all 4 extremities  NEUROLOGIC: Alert, oriented x 3. No gross deficits. GCS 15.   PSYCH: appropriate affect.    Critical Care  Performed by: Robert Fowler DO  Authorized by: Robert Fowler DO     Critical care provider statement:     Critical care time (minutes):  37    Critical care time was exclusive of:  Separately billable procedures and treating other patients    Critical care was necessary to treat or prevent imminent or life-threatening deterioration of the following conditions: Asthma exacerbation prolonged monitoring.    Critical care was time spent personally by me on the following activities:  Development of treatment plan " with patient or surrogate, evaluation of patient's response to treatment, examination of patient, ordering and performing treatments and interventions, ordering and review of laboratory studies, pulse oximetry, re-evaluation of patient's condition and obtaining history from patient or surrogate      No attending physician procedures were performed on this patient.  ED Course:        ED Course as of 03/09/23 0459   Thu Mar 02, 2023   2250 EKG interpreted by me reveals sinus tachycardia 121.  No ectopy no ischemic changes moderate artifact. [PF]      ED Course User Index  [PF] Robert Fowler,        Lab Results (last 24 hours)     ** No results found for the last 24 hours. **           No radiology results from the last 24 hrs       MDM    Initial impression of presenting illness: 42-year-old female presents short of breath and wheezing history of asthma    DDX: includes but is not limited to: Status asthmaticus,    Patient arrives normotensive afebrile no tachycardia oxygen saturations 95% with vitals interpreted by myself.     Pertinent features from physical exam: Diffuse wheezes, mild increased work of breathing on arrival.    Initial diagnostic plan: Duo neb magnesium, Toradol for headache, repeat nebs, monitor.  Nursing triage protocol placed, BNP troponin CBC CMP nonactionable      Diagnostic information from other sources: None    Interventions / Re-evaluation: Better after ER intervention    Results/clinical rationale were discussed with patient    Consultations/Discussion of results with other physicians: None    Disposition plan: Patient was discharged in home stable condition.  Counseled on supportive care, outpatient follow-up. Return precaution discussed.  Patient/family was understanding and agreeable with plan  Patient was monitored in emergency department 3-1/2+ hours in regards to asthma exacerbation.  She was feeling much better subsequently discharged home in stable condition  -----    Final  diagnoses:   Exacerbation of asthma, unspecified asthma severity, unspecified whether persistent        Robert Fowler,   03/09/23 1365

## 2023-04-12 ENCOUNTER — OFFICE VISIT (OUTPATIENT)
Dept: PULMONOLOGY | Facility: CLINIC | Age: 43
End: 2023-04-12
Payer: COMMERCIAL

## 2023-04-12 ENCOUNTER — PATIENT ROUNDING (BHMG ONLY) (OUTPATIENT)
Dept: PULMONOLOGY | Facility: CLINIC | Age: 43
End: 2023-04-12
Payer: COMMERCIAL

## 2023-04-12 VITALS
HEART RATE: 98 BPM | WEIGHT: 159 LBS | OXYGEN SATURATION: 98 % | DIASTOLIC BLOOD PRESSURE: 68 MMHG | SYSTOLIC BLOOD PRESSURE: 120 MMHG | RESPIRATION RATE: 18 BRPM | HEIGHT: 65 IN | BODY MASS INDEX: 26.49 KG/M2

## 2023-04-12 DIAGNOSIS — J45.50 SEVERE PERSISTENT ASTHMA WITHOUT COMPLICATION: ICD-10-CM

## 2023-04-12 DIAGNOSIS — J30.89 OTHER ALLERGIC RHINITIS: ICD-10-CM

## 2023-04-12 DIAGNOSIS — R06.02 SHORTNESS OF BREATH: Primary | ICD-10-CM

## 2023-04-12 PROCEDURE — 95012 NITRIC OXIDE EXP GAS DETER: CPT | Performed by: INTERNAL MEDICINE

## 2023-04-12 PROCEDURE — 99204 OFFICE O/P NEW MOD 45 MIN: CPT | Performed by: INTERNAL MEDICINE

## 2023-04-12 RX ORDER — AZELASTINE 1 MG/ML
1 SPRAY, METERED NASAL 2 TIMES DAILY PRN
Qty: 1 EACH | Refills: 5 | Status: SHIPPED | OUTPATIENT
Start: 2023-04-12

## 2023-04-12 RX ORDER — BUPROPION HCL 100 MG
50 TABLET ORAL 2 TIMES DAILY
COMMUNITY

## 2023-04-12 NOTE — PROGRESS NOTES
"  CONSULT NOTE    Requested by:   Kris Melton MD      Chief Complaint   Patient presents with   • Breathing Problem   • Consult       Subjective:  Marlin Silva is a 42 y.o. female.     History of Present Illness   Patient comes in today for consultation because of shortness of breath for the past many years    The patient says that her shortness of breath has been worse for the past 2-3 months ago. It is also usually associated with wheezing. Patient says that she occasionally has a cough that usually follow activity & seasonal changes.    The patient 2 dogs at home. There seems to be a seasonal variation to the symptoms.    The patient does have a family history of asthma, in her mother. She reports known personal history of asthma.    The patient denies a history of smoking.    The patient says that she has never been on mechanical ventilation. she is currently not on oxygen.     Patient also complains of runny nose and dribbling in the back of the throat for the past few weeks. This has been sometimes associated with seasonal variation.    she does teach first grade.     She actually started taking prednisone again, 3 days ago as she started noticing worsening symptoms.    The following portions of the patient's history were reviewed and updated as appropriate: allergies, current medications, past family history, past medical history, past social history and past surgical history.    Review of Systems   HENT: Positive for sinus pressure. Negative for sneezing and sore throat.    Respiratory: Positive for cough, shortness of breath and wheezing. Negative for chest tightness.    Cardiovascular: Negative for palpitations and leg swelling.   All other systems reviewed and are negative.      Past Medical History:   Diagnosis Date   • Arthritis    • Asthma    • Depression    • Dysphagia     REPORTS \"IT'S LIKE THINGS WON'T GO DOWN SOMETIMES\"   • Fibromyalgia    • GERD (gastroesophageal reflux " "disease)     PATIENT REPORTS HAD ER VISIT IN DECEMBER OF 2016 FOR RIGHT SIDED PAIN.  PATIENT THOUGHT MAY BE HEART RELATED BUT REPORTS WAS TOLD ONLY REFLUX.   • History of bronchitis    • History of kidney stones     REPORTS PASSED WITHOUT SURGICAL INTERVENTION IN SUMMER OF 2017   • History of panic attacks    • History of pneumonia    • Insomnia    • MRSA (methicillin resistant Staphylococcus aureus) 2008    REPORTS IN SINUS CAVITY AND THAT SHE WAS TREATED   • Neck pain    • TIA (transient ischemic attack)     REPORTS HX OF TIA 8 YEARS AGO AND THAT SHE WAS TAKEN OFF OF BCP.  NO RESIDUAL NOTED   • Wears glasses     FOR READING       Social History     Tobacco Use   • Smoking status: Never   • Smokeless tobacco: Never   Substance Use Topics   • Alcohol use: Not Currently     Comment: SOCIAL USE ONLY, NO HX OF ABUSE REPORTED         Objective:  Visit Vitals  /68   Pulse 98   Resp 18   Ht 165.1 cm (65\")   Wt 72.1 kg (159 lb)   SpO2 98%   BMI 26.46 kg/m²       Physical Exam  Vitals reviewed.   Constitutional:       Appearance: She is well-developed.   HENT:      Head:      Comments: No acute lesions noted     Nose:      Comments: Nasal erythema noted.     Mouth/Throat:      Mouth: Mucous membranes are moist.      Comments: Oropharynx was somewhat cobblestoned.  Neck:      Thyroid: No thyromegaly.      Vascular: No JVD.   Cardiovascular:      Rate and Rhythm: Normal rate and regular rhythm.      Heart sounds: No murmur heard.  Pulmonary:      Effort: Pulmonary effort is normal. No respiratory distress.      Breath sounds: No wheezing or rales.   Musculoskeletal:      Cervical back: Neck supple.      Comments: Gait was normal.   Skin:     General: Skin is warm and dry.   Neurological:      Mental Status: She is alert and oriented to person, place, and time.   Psychiatric:         Behavior: Behavior normal.         Assessment/Plan:  Diagnoses and all orders for this visit:    1. Shortness of breath (Primary)  -     " Nitric Oxide  -     Peak Flow  -     Pulmonary Function Test; Future    2. Severe persistent asthma without complication  -     Nitric Oxide  -     Peak Flow  -     Pulmonary Function Test; Future    3. Other allergic rhinitis  -     IgE; Future  -     Allergens, Zone 8; Future    Other orders  -     azelastine (ASTELIN) 0.1 % nasal spray; 1 spray into the nostril(s) as directed by provider 2 (Two) Times a Day As Needed for Rhinitis or Allergies. Use in each nostril as directed  Dispense: 1 each; Refill: 5  -     Fluticasone-Umeclidin-Vilant 200-62.5-25 MCG/ACT aerosol powder ; Inhale 1 puff Daily. Rinse mouth with water after use.  Dispense: 60 each; Refill: 5        Return in about 5 weeks (around 5/17/2023) for Recheck, Labs, For Irma Easton), ....Also 4-5 mths w/ Dr. Tobias, PFT F/U.    DISCUSSION(if any):  Last chest x-ray was reviewed personally and the results were shared with the patient.  Images reviewed personally.   Results for orders placed during the hospital encounter of 03/02/23    XR Chest 1 View    Narrative  PROCEDURE: XR CHEST 1 VW-    HISTORY: [facility]A Triage Protocol    COMPARISON: 02/05/2023.    FINDINGS: The heart is normal in size. The lungs are clear. The  mediastinum is unremarkable. There is no pneumothorax.  There are no  acute osseous abnormalities.    Impression  No acute cardiopulmonary process.        This report was signed and finalized on 3/3/2023 7:44 AM by Joaquina Ayala MD.      Last CT scan results was reviewed in great detail with the patient.  Results for orders placed during the hospital encounter of 06/15/21    CT Chest Pulmonary Embolism    Narrative  FINAL REPORT    TECHNIQUE:  Multiple axial CT images were obtained through the chest  following IV contrast using a CTA/PE protocol.  3D/MIP  reconstruction images were also performed. This study was  performed with techniques to keep radiation doses as low as  reasonably achievable (ALARA). Individualized dose  reduction  techniques using automated exposure control or adjustment of mA  and/or kV according to the patient's size were employed.    CLINICAL HISTORY:  CP, soa, abnl EKG  pe protocol    FINDINGS:  PAs and aorta: No pulmonary embolus.  Thoracic aorta is  unremarkable.  No significant coronary artery calcifications.  Heart/mediastinum: No evidence for right heart strain.  No  pericardial effusion.  The heart is normal in size.  Lungs: Mild  atelectasis.  Otherwise the lungs are clear.  Lymph nodes: No  pathologically enlarged thoracic lymph nodes.  Pleura: No  pneumothorax or pleural effusion.  Chest Wall: No chest wall  contusion.  Bones: No acute fracture.  Upper abdomen: No acute  findings in the upper abdomen.    Impression  No pulmonary embolus.  No acute findings in the chest.    Authenticated by Marlon Parish MD on 06/15/2021 05:39:26 PM        I have also reviewed her last 3 ER notes that mentions exacerbation of asthma and prescription of prednisone.  Also mentions patient was given magnesium and DuoNebs.    I also reviewed her last echocardiogram and shared the results with her.   Results for orders placed during the hospital encounter of 06/15/21    Adult Transthoracic Echo Complete W/ Cont if Necessary Per Protocol    Interpretation Summary  1.  Normal left ventricular size and systolic function, LVEF 55-60%.  2.  Normal LV diastolic filling pattern.  3.  Normal left atrial volume index.  4.  Normal right ventricular size and systolic function.  5.  No significant valvular abnormalities.      ===========================  ===========================    PFTs were ordered for follow up visit.     FeNO level was 147 today.    Peak flow was 250 LPM today.    Laboratory workup also showed   Lab Results   Component Value Date    HGB 13.4 03/02/2023    HGB 13.9 02/05/2023    HGB 14.2 06/16/2021   ,   Lab Results   Component Value Date    HCT 40.7 03/02/2023    HCT 40.9 02/05/2023    HCT 42.6 06/16/2021        Lab Results   Component Value Date    EOSABS 1.48 (H) 03/02/2023    EOSABS 0.01 02/05/2023    EOSABS 0.87 (H) 06/15/2021    & Laboratory workup also showed   Lab Results   Component Value Date    CO2 29.0 03/02/2023     ===========================  ===========================    I had a detailed discussion with the patient regarding her symptoms that are very suggestive of asthma    Orders as above.    The patient was started on Trelegy with instructions to rinse her mouth with water after use.     The patient will be started on Singulair    Other contributing factors may also need to be treated and this will be discussed with the patient, if and when applicable    Patient was advised to use rescue inhaler for when necessary purposes    Patient was also advised to keep a log of the use of rescue inhaler.    Patient was also advised to keep a close eye on peak flow readings and to maintain record of any significant changes in it.    Patient was given reading material.    Patient will be started on nasal spray for symptoms which are definitely consistent with allergic rhinitis.     I have ordered IgE/RAST panel.    She was asked to finish the current course of prednisone, which she has once again started for another exacerbation.    If her symptoms continue to suggest moderate to severe poorly controlled asthma despite having optimal treatment, and/or if she has continued further documented exacerbations, then she will need to be considered for newer therapies, such as Fasenra/Dupixent etc.       Dictated utilizing Dragon dictation.    This document was electronically signed by Carlton Tobias MD on 04/12/23 at 11:44 EDT

## 2023-04-24 DIAGNOSIS — J45.50 SEVERE PERSISTENT ASTHMA WITHOUT COMPLICATION: Primary | ICD-10-CM

## 2023-05-19 ENCOUNTER — LAB (OUTPATIENT)
Dept: LAB | Facility: HOSPITAL | Age: 43
End: 2023-05-19
Payer: COMMERCIAL

## 2023-05-19 ENCOUNTER — OFFICE VISIT (OUTPATIENT)
Dept: PULMONOLOGY | Facility: CLINIC | Age: 43
End: 2023-05-19
Payer: COMMERCIAL

## 2023-05-19 VITALS
BODY MASS INDEX: 27.49 KG/M2 | SYSTOLIC BLOOD PRESSURE: 122 MMHG | HEIGHT: 65 IN | DIASTOLIC BLOOD PRESSURE: 78 MMHG | RESPIRATION RATE: 18 BRPM | OXYGEN SATURATION: 97 % | HEART RATE: 83 BPM | WEIGHT: 165 LBS

## 2023-05-19 DIAGNOSIS — J45.50 SEVERE PERSISTENT ASTHMA WITHOUT COMPLICATION: Primary | ICD-10-CM

## 2023-05-19 DIAGNOSIS — J30.89 OTHER ALLERGIC RHINITIS: ICD-10-CM

## 2023-05-19 DIAGNOSIS — R89.8 EOSINOPHIL COUNT RAISED: ICD-10-CM

## 2023-05-19 PROCEDURE — 86003 ALLG SPEC IGE CRUDE XTRC EA: CPT

## 2023-05-19 PROCEDURE — 82785 ASSAY OF IGE: CPT

## 2023-05-19 RX ORDER — FLUTICASONE FUROATE AND VILANTEROL 200; 25 UG/1; UG/1
1 POWDER RESPIRATORY (INHALATION)
Qty: 1 EACH | Refills: 5 | Status: SHIPPED | OUTPATIENT
Start: 2023-05-19 | End: 2023-06-18

## 2023-05-19 RX ORDER — PROMETHAZINE HYDROCHLORIDE 25 MG/1
TABLET ORAL
COMMUNITY
Start: 2023-04-21

## 2023-05-19 RX ORDER — BUPROPION HYDROCHLORIDE 150 MG/1
1 TABLET ORAL DAILY
COMMUNITY
Start: 2023-04-20

## 2023-05-19 NOTE — PROGRESS NOTES
"     Follow Up Office Visit      Patient Name: Marlin Silva    Chief Complaint:    Chief Complaint   Patient presents with   • Breathing Problem   • Follow-up       History of Present Illness: Marlin Silva is a 42 y.o. female who is here today for follow up of asthma.  Since last visit, she has had significant improvement in symptoms with use of Trelegy.  Unfortunately, she notes that her insurance has denied coverage for Breztri was subsequently sent to her pharmacy, though she notes that Breztri is also not covered.  She has therefore been using Trelegy samples.  With use of Trelegy, she is not needed further prednisone use and has also not been requiring use of albuterol.  Continues on theophylline.  She notes that she has tried going off of theophylline in the past, but has been unable to do so due to worsening symptoms when dose was reduced.    Duration: Asthma all her life  Associated Symptoms: No current cough or wheezing, no dyspnea, no fevers, no hemoptysis  Supplemental Oxygen: No    Subjective      Review of Systems:  Review of Systems   Constitutional: Negative for fever and unexpected weight change.   Respiratory: Negative for cough, shortness of breath and wheezing.    Cardiovascular: Negative for chest pain and leg swelling.   Allergic/Immunologic: Positive for environmental allergies.        Past Medical History:   Past Medical History:   Diagnosis Date   • Arthritis    • Asthma    • Depression    • Dysphagia     REPORTS \"IT'S LIKE THINGS WON'T GO DOWN SOMETIMES\"   • Fibromyalgia    • GERD (gastroesophageal reflux disease)     PATIENT REPORTS HAD ER VISIT IN DECEMBER OF 2016 FOR RIGHT SIDED PAIN.  PATIENT THOUGHT MAY BE HEART RELATED BUT REPORTS WAS TOLD ONLY REFLUX.   • History of bronchitis    • History of kidney stones     REPORTS PASSED WITHOUT SURGICAL INTERVENTION IN SUMMER OF 2017   • History of panic attacks    • History of pneumonia    • Insomnia    • MRSA (methicillin " resistant Staphylococcus aureus) 2008    REPORTS IN SINUS CAVITY AND THAT SHE WAS TREATED   • Neck pain    • TIA (transient ischemic attack)     REPORTS HX OF TIA 8 YEARS AGO AND THAT SHE WAS TAKEN OFF OF BCP.  NO RESIDUAL NOTED   • Wears glasses     FOR READING       Past Surgical History:   Past Surgical History:   Procedure Laterality Date   • BREAST AUGMENTATION     • COLONOSCOPY     • DILATION AND CURETTAGE, DIAGNOSTIC / THERAPEUTIC     • ENDOSCOPY     • INGUINAL HERNIA REPAIR Right 11/20/2017    Procedure: REPAIR OF INCARCERATED RIGHT FEMORAL HERNIA, PRIMARY REPAIR OF BLADDER;  Surgeon: Jennifer Torrez MD;  Location: [facility] OR;  Service:    • SINUS SURGERY     • WISDOM TOOTH EXTRACTION         Family History:   Family History   Problem Relation Age of Onset   • Hypertension Father    • Cancer Maternal Aunt    • Diabetes Paternal Uncle        Social History:   Social History     Socioeconomic History   • Marital status:    Tobacco Use   • Smoking status: Never   • Smokeless tobacco: Never   Substance and Sexual Activity   • Alcohol use: Not Currently     Comment: SOCIAL USE ONLY, NO HX OF ABUSE REPORTED   • Drug use: No   • Sexual activity: Defer       Current Medications:     Current Outpatient Medications:   •  albuterol (PROVENTIL HFA;VENTOLIN HFA) 108 (90 Base) MCG/ACT inhaler, Inhale 2 puffs Every 4 (Four) Hours As Needed for Wheezing., Disp: , Rfl:   •  albuterol (PROVENTIL) (5 MG/ML) 0.5% nebulizer solution, Take 2.5 mg by nebulization Every 6 (Six) Hours As Needed for Wheezing., Disp: , Rfl:   •  ARIPiprazole (ABILIFY) 5 MG tablet, Take 2 mg by mouth Daily., Disp: , Rfl:   •  aspirin 81 MG EC tablet, Take 1 tablet by mouth Daily., Disp: , Rfl:   •  azelastine (ASTELIN) 0.1 % nasal spray, 1 spray into the nostril(s) as directed by provider 2 (Two) Times a Day As Needed for Rhinitis or Allergies. Use in each nostril as directed, Disp: 1 each, Rfl: 5  •  buPROPion XL (WELLBUTRIN XL) 150 MG 24 hr  "tablet, Take 1 tablet by mouth Daily., Disp: , Rfl:   •  clonazePAM (KlonoPIN) 1 MG tablet, Take 1 tablet by mouth Every Night., Disp: , Rfl:   •  diphenhydrAMINE (BENADRYL) 25 mg capsule, Take 1 capsule by mouth At Night As Needed., Disp: , Rfl:   •  ibuprofen (ADVIL,MOTRIN) 600 MG tablet, Take 1 tablet by mouth As Needed for Mild Pain., Disp: , Rfl:   •  Loratadine-Pseudoephedrine (CLARITIN-D 24 HOUR PO), Take 1 tablet by mouth Daily As Needed., Disp: , Rfl:   •  promethazine (PHENERGAN) 25 MG tablet, TAKE ONE TABLET BY MOUTH EVERY 4 HOURS AS NEEDED FOR NAUSEA AND VOMITING, Disp: , Rfl:   •  temazepam (RESTORIL) 15 MG capsule, Take 1 capsule by mouth every night at bedtime., Disp: , Rfl:   •  theophylline (UNIPHYL) 600 MG 24 hr tablet, Take 1 tablet by mouth Daily., Disp: , Rfl:   •  traMADol (ULTRAM) 50 MG tablet, Take 1 tablet by mouth Every 6 (Six) Hours As Needed for Moderate Pain., Disp: , Rfl:      Allergies:   Allergies   Allergen Reactions   • Bactrim [Sulfamethoxazole-Trimethoprim] Dizziness     Severe shaking     • Sulfa Antibiotics Other (See Comments)     REPORTS CAUSED HAND TREMORS AND HEART PALPITATIONS         Objective     Physical Exam:  Vital Signs:   Vitals:    05/19/23 1300   BP: 122/78   Pulse: 83   Resp: 18   SpO2: 97%   Weight: 74.8 kg (165 lb)   Height: 165.1 cm (65\")     Body mass index is 27.46 kg/m².    Physical Exam  Vitals reviewed.   Constitutional:       General: She is not in acute distress.     Appearance: She is normal weight. She is not toxic-appearing.   HENT:      Head: Normocephalic and atraumatic.      Right Ear: External ear normal.      Left Ear: External ear normal.      Nose: Nose normal.      Mouth/Throat:      Mouth: Mucous membranes are moist.   Eyes:      Extraocular Movements: Extraocular movements intact.      Conjunctiva/sclera: Conjunctivae normal.      Pupils: Pupils are equal, round, and reactive to light.   Cardiovascular:      Rate and Rhythm: Normal rate.     "  Heart sounds: Normal heart sounds.   Pulmonary:      Effort: Pulmonary effort is normal.      Breath sounds: Normal breath sounds.   Abdominal:      General: There is no distension.      Palpations: Abdomen is soft.   Musculoskeletal:         General: No swelling.      Cervical back: Neck supple.   Skin:     General: Skin is warm and dry.      Findings: No rash.   Neurological:      Mental Status: She is alert.   Psychiatric:         Mood and Affect: Mood normal.         Behavior: Behavior normal.         Results Review:   WBC   Date Value Ref Range Status   03/02/2023 9.83 3.40 - 10.80 10*3/mm3 Final     RBC   Date Value Ref Range Status   03/02/2023 4.99 3.77 - 5.28 10*6/mm3 Final     Hemoglobin   Date Value Ref Range Status   03/02/2023 13.4 12.0 - 15.9 g/dL Final     Hematocrit   Date Value Ref Range Status   03/02/2023 40.7 34.0 - 46.6 % Final     MCV   Date Value Ref Range Status   03/02/2023 81.6 79.0 - 97.0 fL Final     MCH   Date Value Ref Range Status   03/02/2023 26.9 26.6 - 33.0 pg Final     MCHC   Date Value Ref Range Status   03/02/2023 32.9 31.5 - 35.7 g/dL Final     RDW   Date Value Ref Range Status   03/02/2023 14.2 12.3 - 15.4 % Final     RDW-SD   Date Value Ref Range Status   03/02/2023 41.6 37.0 - 54.0 fl Final     MPV   Date Value Ref Range Status   03/02/2023 9.5 6.0 - 12.0 fL Final     Platelets   Date Value Ref Range Status   03/02/2023 317 140 - 450 10*3/mm3 Final     Neutrophil %   Date Value Ref Range Status   03/02/2023 49.6 42.7 - 76.0 % Final     Lymphocyte %   Date Value Ref Range Status   03/02/2023 27.1 19.6 - 45.3 % Final     Monocyte %   Date Value Ref Range Status   03/02/2023 6.5 5.0 - 12.0 % Final     Eosinophil %   Date Value Ref Range Status   03/02/2023 15.1 (H) 0.3 - 6.2 % Final     Basophil %   Date Value Ref Range Status   03/02/2023 1.2 0.0 - 1.5 % Final     Immature Grans %   Date Value Ref Range Status   03/02/2023 0.5 0.0 - 0.5 % Final     Neutrophils, Absolute   Date  Value Ref Range Status   03/02/2023 4.88 1.70 - 7.00 10*3/mm3 Final     Lymphocytes, Absolute   Date Value Ref Range Status   03/02/2023 2.66 0.70 - 3.10 10*3/mm3 Final     Monocytes, Absolute   Date Value Ref Range Status   03/02/2023 0.64 0.10 - 0.90 10*3/mm3 Final     Eosinophils, Absolute   Date Value Ref Range Status   03/02/2023 1.48 (H) 0.00 - 0.40 10*3/mm3 Final     Basophils, Absolute   Date Value Ref Range Status   03/02/2023 0.12 0.00 - 0.20 10*3/mm3 Final     Immature Grans, Absolute   Date Value Ref Range Status   03/02/2023 0.05 0.00 - 0.05 10*3/mm3 Final     nRBC   Date Value Ref Range Status   03/02/2023 0.0 0.0 - 0.2 /100 WBC Final     Assessment / Plan      Assessment/Plan:   Diagnoses and all orders for this visit:    1. Severe persistent asthma without complication (Primary)  -     Fluticasone Furoate-Vilanterol (BREO ELLIPTA) 200-25 MCG/ACT inhaler; Inhale 1 puff Daily for 30 days. Rinse mouth out after use  Dispense: 1 each; Refill: 5  -     Umeclidinium Bromide (INCRUSE ELLIPTA) 62.5 MCG/ACT aerosol powder ; Inhale 1 puff Daily for 30 days.  Dispense: 1 each; Refill: 5  Patient with significantly improved symptoms with use of Trelegy, though is not covered by her insurance.  We will for order Incruse and high-dose Breo. We discussed the risk and benefits of inhaled corticosteroids. Patient instructed to take them on a regular basis as prescribed. Patient instructed to rinse their mouth out after each use. Patient instructed to contact their insurance company and make sure that the medications prescribed are on their formulary and the lowest cost/tier for them. They will call the clinic back if different medications need to prescribed.     2. Other allergic rhinitis  Continue current regimen.    3. Eosinophil count raised  Based on review of recent lab results, will likely need to consider use of an asthma biologic agent if patient has recurrent exacerbations/uncontrolled symptoms despite  high-dose ICS use.       Follow Up:   September 2023 with PFT prior to appointment as previously scheduled  The patient was counseled on diagnostic results, risks and benefits of treatment options, risk factor modifications and the importance of treatment compliance. The patient was advised to contact the clinic with concerns or worsening symptoms.     LUISA Hogan   Pulmonary Medicine [facility]

## 2023-05-24 LAB
A ALTERNATA IGE QN: 2.04 KU/L
A FUMIGATUS IGE QN: 0.35 KU/L
AMER ROACH IGE QN: <0.1 KU/L
BAHIA GRASS IGE QN: <0.1 KU/L
BERMUDA GRASS IGE QN: <0.1 KU/L
BOXELDER IGE QN: <0.1 KU/L
C HERBARUM IGE QN: 0.16 KU/L
CAT DANDER IGE QN: <0.1 KU/L
CMN PIGWEED IGE QN: <0.1 KU/L
COMMON RAGWEED IGE QN: 0.79 KU/L
CONV CLASS DESCRIPTION: ABNORMAL
D FARINAE IGE QN: 0.11 KU/L
D PTERONYSS IGE QN: 0.13 KU/L
DOG DANDER IGE QN: 4.01 KU/L
ENGL PLANTAIN IGE QN: <0.1 KU/L
HAZELNUT POLN IGE QN: <0.1 KU/L
JOHNSON GRASS IGE QN: <0.1 KU/L
KENT BLUE GRASS IGE QN: <0.1 KU/L
LONDON PLANE IGE QN: <0.1 KU/L
M RACEMOSUS IGE QN: <0.1 KU/L
MT JUNIPER IGE QN: 0.1 KU/L
MUGWORT IGE QN: <0.1 KU/L
NETTLE IGE QN: <0.1 KU/L
P NOTATUM IGE QN: 0.17 KU/L
S BOTRYOSUM IGE QN: 0.79 KU/L
SHEEP SORREL IGE QN: <0.1 KU/L
SWEET GUM IGE QN: <0.1 KU/L
WHITE ELM IGE QN: <0.1 KU/L
WHITE HICKORY IGE QN: <0.1 KU/L
WHITE MULBERRY IGE QN: <0.1 KU/L
WHITE OAK IGE QN: <0.1 KU/L

## 2023-05-25 LAB — IGE SERPL-ACNC: 1099 IU/ML (ref 6–495)

## 2023-05-26 RX ORDER — FLUTICASONE PROPIONATE AND SALMETEROL 500; 50 UG/1; UG/1
1 POWDER RESPIRATORY (INHALATION)
Qty: 60 EACH | Refills: 11 | Status: SHIPPED | OUTPATIENT
Start: 2023-05-26

## 2023-05-26 RX ORDER — TIOTROPIUM BROMIDE INHALATION SPRAY 3.12 UG/1
2 SPRAY, METERED RESPIRATORY (INHALATION)
Qty: 1 EACH | Refills: 5 | Status: SHIPPED | OUTPATIENT
Start: 2023-05-26

## 2023-08-19 ENCOUNTER — HOSPITAL ENCOUNTER (EMERGENCY)
Facility: HOSPITAL | Age: 43
Discharge: HOME | End: 2023-08-20
Attending: EMERGENCY MEDICINE
Payer: COMMERCIAL

## 2023-08-19 DIAGNOSIS — R56.9 SEIZURE: Primary | ICD-10-CM

## 2023-08-19 LAB
ALBUMIN SERPL-MCNC: 4.5 G/DL (ref 3.5–5.2)
ALBUMIN/GLOB SERPL: 1.6 G/DL
ALP SERPL-CCNC: 92 U/L (ref 39–117)
ALT SERPL W P-5'-P-CCNC: 18 U/L (ref 1–33)
ANION GAP SERPL CALCULATED.3IONS-SCNC: 16.6 MMOL/L (ref 5–15)
AST SERPL-CCNC: 21 U/L (ref 1–32)
BASOPHILS # BLD AUTO: 0.1 10*3/MM3 (ref 0–0.2)
BASOPHILS NFR BLD AUTO: 1.4 % (ref 0–1.5)
BILIRUB SERPL-MCNC: 0.2 MG/DL (ref 0–1.2)
BUN SERPL-MCNC: 14 MG/DL (ref 6–20)
BUN/CREAT SERPL: 11.7 (ref 7–25)
CALCIUM SPEC-SCNC: 9.7 MG/DL (ref 8.6–10.5)
CHLORIDE SERPL-SCNC: 103 MMOL/L (ref 98–107)
CO2 SERPL-SCNC: 21.4 MMOL/L (ref 22–29)
CREAT SERPL-MCNC: 1.2 MG/DL (ref 0.57–1)
DEPRECATED RDW RBC AUTO: 41.8 FL (ref 37–54)
EGFRCR SERPLBLD CKD-EPI 2021: 57.7 ML/MIN/1.73
EOSINOPHIL # BLD AUTO: 0.63 10*3/MM3 (ref 0–0.4)
EOSINOPHIL NFR BLD AUTO: 8.6 % (ref 0.3–6.2)
ERYTHROCYTE [DISTWIDTH] IN BLOOD BY AUTOMATED COUNT: 15.3 % (ref 12.3–15.4)
GLOBULIN UR ELPH-MCNC: 2.8 GM/DL
GLUCOSE BLDC GLUCOMTR-MCNC: 83 MG/DL (ref 70–130)
GLUCOSE SERPL-MCNC: 79 MG/DL (ref 65–99)
HCT VFR BLD AUTO: 38.5 % (ref 34–46.6)
HGB BLD-MCNC: 13.2 G/DL (ref 12–15.9)
HOLD SPECIMEN: NORMAL
HOLD SPECIMEN: NORMAL
IMM GRANULOCYTES # BLD AUTO: 0.03 10*3/MM3 (ref 0–0.05)
IMM GRANULOCYTES NFR BLD AUTO: 0.4 % (ref 0–0.5)
LYMPHOCYTES # BLD AUTO: 1.86 10*3/MM3 (ref 0.7–3.1)
LYMPHOCYTES NFR BLD AUTO: 25.4 % (ref 19.6–45.3)
MCH RBC QN AUTO: 26.7 PG (ref 26.6–33)
MCHC RBC AUTO-ENTMCNC: 34.3 G/DL (ref 31.5–35.7)
MCV RBC AUTO: 77.9 FL (ref 79–97)
MONOCYTES # BLD AUTO: 0.5 10*3/MM3 (ref 0.1–0.9)
MONOCYTES NFR BLD AUTO: 6.8 % (ref 5–12)
NEUTROPHILS NFR BLD AUTO: 4.21 10*3/MM3 (ref 1.7–7)
NEUTROPHILS NFR BLD AUTO: 57.4 % (ref 42.7–76)
NRBC BLD AUTO-RTO: 0 /100 WBC (ref 0–0.2)
PLATELET # BLD AUTO: 408 10*3/MM3 (ref 140–450)
PMV BLD AUTO: 8.9 FL (ref 6–12)
POTASSIUM SERPL-SCNC: 3.9 MMOL/L (ref 3.5–5.2)
PROT SERPL-MCNC: 7.3 G/DL (ref 6–8.5)
RBC # BLD AUTO: 4.94 10*6/MM3 (ref 3.77–5.28)
SODIUM SERPL-SCNC: 141 MMOL/L (ref 136–145)
WBC NRBC COR # BLD: 7.33 10*3/MM3 (ref 3.4–10.8)
WHOLE BLOOD HOLD COAG: NORMAL
WHOLE BLOOD HOLD SPECIMEN: NORMAL

## 2023-08-19 PROCEDURE — 82948 REAGENT STRIP/BLOOD GLUCOSE: CPT

## 2023-08-19 PROCEDURE — 25010000002 LORAZEPAM PER 2 MG: Performed by: EMERGENCY MEDICINE

## 2023-08-19 PROCEDURE — 85025 COMPLETE CBC W/AUTO DIFF WBC: CPT | Performed by: EMERGENCY MEDICINE

## 2023-08-19 PROCEDURE — 80053 COMPREHEN METABOLIC PANEL: CPT | Performed by: EMERGENCY MEDICINE

## 2023-08-19 PROCEDURE — 96374 THER/PROPH/DIAG INJ IV PUSH: CPT

## 2023-08-19 PROCEDURE — 93005 ELECTROCARDIOGRAM TRACING: CPT | Performed by: EMERGENCY MEDICINE

## 2023-08-19 PROCEDURE — 99283 EMERGENCY DEPT VISIT LOW MDM: CPT

## 2023-08-19 RX ORDER — LORAZEPAM 2 MG/ML
1 INJECTION INTRAMUSCULAR ONCE
Status: COMPLETED | OUTPATIENT
Start: 2023-08-19 | End: 2023-08-19

## 2023-08-19 RX ORDER — SODIUM CHLORIDE 0.9 % (FLUSH) 0.9 %
10 SYRINGE (ML) INJECTION AS NEEDED
Status: DISCONTINUED | OUTPATIENT
Start: 2023-08-19 | End: 2023-08-20 | Stop reason: HOSPADM

## 2023-08-19 RX ADMIN — LORAZEPAM 1 MG: 2 INJECTION INTRAMUSCULAR; INTRAVENOUS at 23:01

## 2023-08-20 VITALS
OXYGEN SATURATION: 96 % | RESPIRATION RATE: 16 BRPM | WEIGHT: 174.2 LBS | SYSTOLIC BLOOD PRESSURE: 122 MMHG | HEIGHT: 65 IN | DIASTOLIC BLOOD PRESSURE: 59 MMHG | HEART RATE: 77 BPM | BODY MASS INDEX: 29.02 KG/M2 | TEMPERATURE: 98.3 F

## 2023-08-20 NOTE — ED PROVIDER NOTES
"Subjective  History of Present Illness:    Chief Complaint: Seizure    History of Present Illness: 43-year-old female presents with seizure, had recent MRI and neurology evaluation, started on Lamictal approximately 2 weeks prior, and seizure lasting approximately 2 minutes, patient has been nonverbal since, no reported fall or injury    Nurses Notes reviewed and agree, including vitals, allergies, social history and prior medical history.     REVIEW OF SYSTEMS: All systems reviewed and not pertinent unless noted.  Review of Systems      Positive for: Seizure    Negative for: Missed medication, fall or trauma    Past Medical History:   Diagnosis Date    Arthritis     Asthma     Depression     Dysphagia     REPORTS \"IT'S LIKE THINGS WON'T GO DOWN SOMETIMES\"    Fibromyalgia     GERD (gastroesophageal reflux disease)     PATIENT REPORTS HAD ER VISIT IN DECEMBER OF 2016 FOR RIGHT SIDED PAIN.  PATIENT THOUGHT MAY BE HEART RELATED BUT REPORTS WAS TOLD ONLY REFLUX.    History of bronchitis     History of kidney stones     REPORTS PASSED WITHOUT SURGICAL INTERVENTION IN SUMMER OF 2017    History of panic attacks     History of pneumonia     Insomnia     MRSA (methicillin resistant Staphylococcus aureus) 2008    REPORTS IN [facility]ITY AND THAT SHE WAS TREATED    Neck pain     TIA (transient ischemic attack)     REPORTS HX OF TIA 8 YEARS AGO AND THAT SHE WAS TAKEN OFF OF BCP.  NO RESIDUAL NOTED    Wears glasses     FOR READING       Allergies:    Bactrim [sulfamethoxazole-trimethoprim] and Sulfa antibiotics      Past Surgical History:   Procedure Laterality Date    BREAST AUGMENTATION      COLONOSCOPY      DILATION AND CURETTAGE, DIAGNOSTIC / THERAPEUTIC      ENDOSCOPY      INGUINAL HERNIA REPAIR Right 11/20/2017    Procedure: REPAIR OF INCARCERATED RIGHT FEMORAL HERNIA, PRIMARY REPAIR OF BLADDER;  Surgeon: Jennifer Torrez MD;  Location: [facility];  Service:     SINUS SURGERY      WISDOM TOOTH EXTRACTION           Social " "History     Socioeconomic History    Marital status:    Tobacco Use    Smoking status: Never    Smokeless tobacco: Never   Substance and Sexual Activity    Alcohol use: Not Currently     Comment: SOCIAL USE ONLY, NO HX OF ABUSE REPORTED    Drug use: No    Sexual activity: Defer         Family History   Problem Relation Age of Onset    Hypertension Father     Cancer Maternal Aunt     Diabetes Paternal Uncle        Objective  Physical Exam:  /65   Pulse 75   Temp 98.3 øF (36.8 øC) (Oral)   Resp 16   Ht 165.1 cm (65\")   Wt 79 kg (174 lb 3.2 oz)   LMP 07/19/2023   SpO2 98%   BMI 28.99 kg/mý      Physical Exam    CONSTITUTIONAL: Well developed, none nontoxic 43-year-old female,  in no acute distress, appears mildly postictal.  VITAL SIGNS: per nursing, reviewed and noted  SKIN: exposed skin with no rashes, ulcerations or petechiae  EYES: Grossly EOMI, no icterus  ENT: Moist mucous membranes evidence of superficial tongue laceration to the lateral aspect of the posterior right tongue  RESPIRATORY:  No increased work of breathing. No retractions.  Chest clear  CARDIOVASCULAR:   Extremities pink and warm.  Good cap refill to extremities.   GI: Abdomen without distention   MUSCULOSKELETAL: Age-appropriate bulk and tone, moves all 4 extremities  NEUROLOGIC: Alert,  No focal weakness no facial droop no meningeal signs follows commands.  Nonverbal  PSYCH: Flat affect.  : no bladder tenderness or distention, no CVA tenderness    Procedures    ED Course:    Lab Results (last 24 hours)       Procedure Component Value Units Date/Time    CBC & Differential [[id number]]  (Abnormal) Collected: 08/19/23 2237    Specimen: Blood Updated: 08/19/23 2258    Narrative:      The following orders were created for panel order CBC & Differential.  Procedure                               Abnormality         Status                     ---------                               -----------         ------                     CBC " Auto Differential[[id number]]        Abnormal            Final result                 Please view results for these tests on the individual orders.    Comprehensive Metabolic Panel [[id number]]  (Abnormal) Collected: 08/19/23 2237    Specimen: Blood Updated: 08/19/23 2311     Glucose 79 mg/dL      BUN 14 mg/dL      Creatinine 1.20 mg/dL      Sodium 141 mmol/L      Potassium 3.9 mmol/L      Chloride 103 mmol/L      CO2 21.4 mmol/L      Calcium 9.7 mg/dL      Total Protein 7.3 g/dL      Albumin 4.5 g/dL      ALT (SGPT) 18 U/L      AST (SGOT) 21 U/L      Alkaline Phosphatase 92 U/L      Total Bilirubin 0.2 mg/dL      Globulin 2.8 gm/dL      A/G Ratio 1.6 g/dL      BUN/Creatinine Ratio 11.7     Anion Gap 16.6 mmol/L      eGFR 57.7 mL/min/1.73     Narrative:      GFR Normal >60  Chronic Kidney Disease <60  Kidney Failure <15      CBC Auto Differential [[id number]]  (Abnormal) Collected: 08/19/23 2237    Specimen: Blood Updated: 08/19/23 2258     WBC 7.33 10*3/mm3      RBC 4.94 10*6/mm3      Hemoglobin 13.2 g/dL      Hematocrit 38.5 %      MCV 77.9 fL      MCH 26.7 pg      MCHC 34.3 g/dL      RDW 15.3 %      RDW-SD 41.8 fl      MPV 8.9 fL      Platelets 408 10*3/mm3      Neutrophil % 57.4 %      Lymphocyte % 25.4 %      Monocyte % 6.8 %      Eosinophil % 8.6 %      Basophil % 1.4 %      Immature Grans % 0.4 %      Neutrophils, Absolute 4.21 10*3/mm3      Lymphocytes, Absolute 1.86 10*3/mm3      Monocytes, Absolute 0.50 10*3/mm3      Eosinophils, Absolute 0.63 10*3/mm3      Basophils, Absolute 0.10 10*3/mm3      Immature Grans, Absolute 0.03 10*3/mm3      nRBC 0.0 /100 WBC     POC Glucose Once [[id number]]  (Normal) Collected: 08/19/23 2247    Specimen: Blood Updated: 08/19/23 2250     Glucose 83 mg/dL      Comment: Serial Number: [id number]:  [id number]                No radiology results from the last 24 hrs     MDM     Amount and/or Complexity of Data Reviewed  Clinical lab tests: reviewed        ED Course as of  08/20/23 0103   Sat Aug 19, 2023   2301 EKG interpreted by me reveals sinus tachycardia rate of 106 no ectopy, no ischemic changes, [PF]   5397 Patient spouse reports patient is improving now verbalizing. [PF]      ED Course User Index  [PF] Robert Fowler DO       Medical Decision Making:    Initial impression of presenting illness:  43-year-old female presents with seizure, had recent MRI and neurology evaluation, started on Lamictal approximately 2 weeks prior, and seizure lasting approximately 2 minutes, patient has been nonverbal since, no reported fall or injury    DDX: includes but is not limited to: Seizure         Patient arrives mildly hypertensive afebrile no tachycardia sats 98% with vitals interpreted by myself.     Pertinent features from physical exam: Flat affect, no focal weakness no facial droop no outward signs of trauma.  Nonverbal, appears to be somewhat postictal.    Initial diagnostic plan: CBC CMP UA urine pregnancy    Results from initial plan were reviewed and interpreted by me revealing and nonactionable CBC CMP.  Patient was unable to provide urine sample,    Diagnostic information from other sources: Old record review    Interventions / Re-evaluation: Ativan, monitor emergency department 2-1/2+ hours,    Results/clinical rationale were discussed with patient family member at the bedside    Consultations/Discussion of results with other physicians: None    Disposition plan: Patient was back to baseline, states she felt she was ready to go home, shared decision making as she was unable to provide a urine sample but denies any urinary complaints.  Advised to contact her neurologist for medication management recommendations, and return precautions discussed.  Also recommended no operation of heavy machinery and seizure precautions.  -----      Final diagnoses:   None     TONIO reviewed by Robert Fowler, Robert Hall,   08/20/23 0103

## 2023-09-20 ENCOUNTER — OFFICE VISIT (OUTPATIENT)
Dept: PULMONOLOGY | Facility: CLINIC | Age: 43
End: 2023-09-20
Payer: COMMERCIAL

## 2023-09-20 VITALS
WEIGHT: 174 LBS | DIASTOLIC BLOOD PRESSURE: 78 MMHG | RESPIRATION RATE: 18 BRPM | HEIGHT: 65 IN | BODY MASS INDEX: 28.99 KG/M2 | OXYGEN SATURATION: 97 % | HEART RATE: 85 BPM | SYSTOLIC BLOOD PRESSURE: 126 MMHG

## 2023-09-20 DIAGNOSIS — J30.89 OTHER ALLERGIC RHINITIS: ICD-10-CM

## 2023-09-20 DIAGNOSIS — R06.02 SHORTNESS OF BREATH: ICD-10-CM

## 2023-09-20 DIAGNOSIS — J45.50 SEVERE PERSISTENT ASTHMA WITHOUT COMPLICATION: ICD-10-CM

## 2023-09-20 DIAGNOSIS — J45.50 SEVERE PERSISTENT ASTHMA WITHOUT COMPLICATION: Primary | ICD-10-CM

## 2023-09-20 RX ORDER — TIOTROPIUM BROMIDE INHALATION SPRAY 1.56 UG/1
2 SPRAY, METERED RESPIRATORY (INHALATION) DAILY
Qty: 1 EACH | Refills: 5 | Status: SHIPPED | OUTPATIENT
Start: 2023-09-20

## 2023-09-20 RX ORDER — FLUTICASONE FUROATE AND VILANTEROL 200; 25 UG/1; UG/1
1 POWDER RESPIRATORY (INHALATION) DAILY
Qty: 60 EACH | Refills: 5 | Status: SHIPPED | OUTPATIENT
Start: 2023-09-20

## 2023-09-20 RX ORDER — FOLIC ACID 5 MG
1 CAPSULE ORAL DAILY
COMMUNITY

## 2023-09-20 RX ORDER — LAMOTRIGINE 200 MG/1
200 TABLET, ORALLY DISINTEGRATING ORAL DAILY
COMMUNITY

## 2023-09-20 RX ORDER — ZAFIRLUKAST 10 MG/1
10 TABLET, FILM COATED ORAL 2 TIMES DAILY
Qty: 60 TABLET | Refills: 5 | Status: SHIPPED | OUTPATIENT
Start: 2023-09-20

## 2023-09-20 RX ORDER — DULOXETIN HYDROCHLORIDE 60 MG/1
60 CAPSULE, DELAYED RELEASE ORAL DAILY
COMMUNITY

## 2023-09-20 NOTE — PROGRESS NOTES
"  Chief Complaint   Patient presents with    Shortness of Breath    Follow-up       Subjective   Marlin Silva is a 43 y.o. female.   Patient was evaluated today for follow up of asthma, and allergic rhinitis.     Patient does not report any recent exacerbations requiring emergency room visits or hospitalizations. she suspects mold in her house.     Patient is compliant with pulmonary medicines, as prescribed.     she is currently on Breo and Advair and not on Incruse, at this time. she is using the rescue inhalers minimally.     Patient says that she has been using her nasal sprays on a regular basis and describes no significant ongoing issues other than occasional congestion.     The following portions of the patient's history were reviewed and updated as appropriate: allergies, current medications, past family history, past medical history, past social history, and past surgical history.    Review of Systems   HENT:  Positive for sinus pressure. Negative for sneezing and sore throat.    Respiratory:  Negative for cough, chest tightness, shortness of breath and wheezing.    Cardiovascular:  Negative for palpitations and leg swelling.     Objective   Visit Vitals  /78   Pulse 85   Resp 18   Ht 165.1 cm (65\")   Wt 78.9 kg (174 lb)   SpO2 97%   BMI 28.96 kg/m²         BMI Readings from Last 3 Encounters:   09/20/23 28.96 kg/m²   08/19/23 28.99 kg/m²   05/19/23 27.46 kg/m²       Physical Exam  Vitals reviewed.   Constitutional:       Appearance: She is well-developed.   Neck:      Vascular: No JVD.   Cardiovascular:      Rate and Rhythm: Normal rate and regular rhythm.      Heart sounds: Normal heart sounds. No murmur heard.  Pulmonary:      Effort: Pulmonary effort is normal. No respiratory distress.      Breath sounds: Normal breath sounds. No wheezing or rales.   Musculoskeletal:      Cervical back: Neck supple.      Comments: Gait was normal.   Skin:     General: Skin is warm and dry.   Neurological: "      Mental Status: She is alert and oriented to person, place, and time.         Assessment & Plan   Diagnoses and all orders for this visit:    1. Severe persistent asthma without complication (Primary)    2. Other allergic rhinitis    Other orders  -     zafirlukast (Accolate) 10 MG tablet; Take 1 tablet by mouth 2 (Two) Times a Day.  Dispense: 60 tablet; Refill: 5  -     Tiotropium Bromide Monohydrate (Spiriva Respimat) 1.25 MCG/ACT aerosol solution inhaler; Inhale 2 puffs Daily.  Dispense: 1 each; Refill: 5  -     Fluticasone Furoate-Vilanterol (BREO ELLIPTA) 200-25 MCG/ACT inhaler; Inhale 1 puff Daily. Rinse mouth with water after use.  Dispense: 60 each; Refill: 5           Return in about 2 months (around 12/5/2023) for Recheck, For [facility] ([place]), ....Also 6 mths w/ Dr. Tobias.    DISCUSSION (if any):  Last CT scan results was reviewed in great detail with the patient.  Results for orders placed during the hospital encounter of 06/15/21    CT Chest Pulmonary Embolism    Narrative  FINAL REPORT    TECHNIQUE:  Multiple axial CT images were obtained through the chest  following IV contrast using a CTA/PE protocol.  3D/MIP  reconstruction images were also performed. This study was  performed with techniques to keep radiation doses as low as  reasonably achievable (ALARA). Individualized dose reduction  techniques using automated exposure control or adjustment of mA  and/or kV according to the patient's size were employed.    CLINICAL HISTORY:  CP, soa, abnl EKG  pe protocol    FINDINGS:  PAs and aorta: No pulmonary embolus.  Thoracic aorta is  unremarkable.  No significant coronary artery calcifications.  Heart/mediastinum: No evidence for right heart strain.  No  pericardial effusion.  The heart is normal in size.  Lungs: Mild  atelectasis.  Otherwise the lungs are clear.  Lymph nodes: No  pathologically enlarged thoracic lymph nodes.  Pleura: No  pneumothorax or pleural effusion.  Chest Wall: No chest  wall  contusion.  Bones: No acute fracture.  Upper abdomen: No acute  findings in the upper abdomen.    Impression  No pulmonary embolus.  No acute findings in the chest.    Authenticated by Marlon Parish MD on 06/15/2021 05:39:26 PM    PFTs were reviewed.  Severe obstruction noted.    Laboratory workup was also reviewed which showed   Lab Results   Component Value Date    EOSABS 0.63 (H) 08/19/2023    EOSABS 1.48 (H) 03/02/2023    EOSABS 0.01 02/05/2023    &   Lab Results   Component Value Date    IGE 1099 (H) 05/19/2023     RAST panel: Positive for multiple antigens.    ===========================  ===========================    It appears that her symptoms of severe asthma are under adequate control with the current regimen.    Patient's medications for underlying asthma were reviewed in great detail.    she thinks that she had allergies to Singulair, when it was restarted a few months ago.     We will start her on Accolate and see how she does.     Any needed adjustments to her pulmonary medications, either for clinical or insurance coverage reasons, have been made and are reflected in the orders.    Will start her on Spiriva and Wixela, for better compliance and hopefully for a better/lesser co pay.     Compliance with medications stressed.     Side effects of prescribed medications discussed with the patient.    The need to continue to be aware of triggers that may cause asthma exacerbation versus progression of disease, was also discussed.    I have discussed asthma action plan with her.    The patient was asked to call this office if the symptoms worsen.    Identification and avoidance of any specific triggers was advised and discussed in detail.    If her symptoms continue to suggest moderate to severe poorly controlled asthma, and/or if she has documented exacerbations, then she may need to be considered for newer therapies.     Patient was advised to continue her nasal spray, especially given improvement  in symptoms overall.        Dictated utilizing Dragon dictation.    This document was electronically signed by Carlton Tobias MD on 09/20/23 at 14:30 EDT

## 2023-11-28 ENCOUNTER — LAB (OUTPATIENT)
Dept: LAB | Facility: HOSPITAL | Age: 43
End: 2023-11-28
Payer: COMMERCIAL

## 2023-11-28 ENCOUNTER — OFFICE VISIT (OUTPATIENT)
Dept: PULMONOLOGY | Facility: CLINIC | Age: 43
End: 2023-11-28
Payer: COMMERCIAL

## 2023-11-28 VITALS
DIASTOLIC BLOOD PRESSURE: 72 MMHG | SYSTOLIC BLOOD PRESSURE: 124 MMHG | HEART RATE: 98 BPM | WEIGHT: 173 LBS | OXYGEN SATURATION: 99 % | RESPIRATION RATE: 18 BRPM | BODY MASS INDEX: 28.82 KG/M2 | HEIGHT: 65 IN

## 2023-11-28 DIAGNOSIS — J45.51 SEVERE PERSISTENT ASTHMA WITH ACUTE EXACERBATION: Primary | ICD-10-CM

## 2023-11-28 DIAGNOSIS — J30.89 OTHER ALLERGIC RHINITIS: ICD-10-CM

## 2023-11-28 DIAGNOSIS — J45.51 SEVERE PERSISTENT ASTHMA WITH ACUTE EXACERBATION: ICD-10-CM

## 2023-11-28 LAB
B PARAPERT DNA SPEC QL NAA+PROBE: NOT DETECTED
B PERT DNA SPEC QL NAA+PROBE: NOT DETECTED
C PNEUM DNA NPH QL NAA+NON-PROBE: NOT DETECTED
FLUAV SUBTYP SPEC NAA+PROBE: NOT DETECTED
FLUBV RNA ISLT QL NAA+PROBE: NOT DETECTED
HADV DNA SPEC NAA+PROBE: NOT DETECTED
HCOV 229E RNA SPEC QL NAA+PROBE: NOT DETECTED
HCOV HKU1 RNA SPEC QL NAA+PROBE: NOT DETECTED
HCOV NL63 RNA SPEC QL NAA+PROBE: NOT DETECTED
HCOV OC43 RNA SPEC QL NAA+PROBE: NOT DETECTED
HMPV RNA NPH QL NAA+NON-PROBE: NOT DETECTED
HPIV1 RNA ISLT QL NAA+PROBE: DETECTED
HPIV2 RNA SPEC QL NAA+PROBE: NOT DETECTED
HPIV3 RNA NPH QL NAA+PROBE: NOT DETECTED
HPIV4 P GENE NPH QL NAA+PROBE: NOT DETECTED
M PNEUMO IGG SER IA-ACNC: NOT DETECTED
RHINOVIRUS RNA SPEC NAA+PROBE: NOT DETECTED
RSV RNA NPH QL NAA+NON-PROBE: NOT DETECTED
SARS-COV-2 RNA NPH QL NAA+NON-PROBE: NOT DETECTED

## 2023-11-28 PROCEDURE — 95012 NITRIC OXIDE EXP GAS DETER: CPT | Performed by: INTERNAL MEDICINE

## 2023-11-28 PROCEDURE — 0202U NFCT DS 22 TRGT SARS-COV-2: CPT

## 2023-11-28 RX ORDER — METHYLPREDNISOLONE ACETATE 80 MG/ML
80 INJECTION, SUSPENSION INTRA-ARTICULAR; INTRALESIONAL; INTRAMUSCULAR; SOFT TISSUE ONCE
Status: COMPLETED | OUTPATIENT
Start: 2023-11-28 | End: 2023-11-28

## 2023-11-28 RX ADMIN — METHYLPREDNISOLONE ACETATE 80 MG: 80 INJECTION, SUSPENSION INTRA-ARTICULAR; INTRALESIONAL; INTRAMUSCULAR; SOFT TISSUE at 10:50

## 2023-11-28 NOTE — PROGRESS NOTES
"  Chief Complaint   Patient presents with    Shortness of Breath    Follow-up       Subjective   Marlin Kimberly Silva is a 43 y.o. female.   Patient was evaluated today complaining of shortness of breath, cough and phlegm production which has been worse for the past few days.    she is complaining of subjective chills.  The patient also endorses mild fever.    The patient says that her sputum is mostly occasionally productive of thick greenish sputum.     Patient is using rescue inhaler/nebulizer more than usual with some relief.    Patient says that she has not been using her nasal sprays on regular basis and is noticing worsening nasal congestion as well as occasional runny nose.    She was not given Accolate by her pharmacy. She has been able to stop her Theophylline since July 2023.     The following portions of the patient's history were reviewed and updated as appropriate: allergies, current medications, past family history, past medical history, past social history, and past surgical history.    Review of Systems   HENT:  Positive for sinus pressure, sneezing and sore throat.    Respiratory:  Positive for cough, chest tightness, shortness of breath and wheezing.    Cardiovascular:  Negative for palpitations and leg swelling.   Psychiatric/Behavioral:  Positive for sleep disturbance (coughing keeps her awake).        Objective   Visit Vitals  /72   Pulse 98   Resp 18   Ht 165.1 cm (65\")   Wt 78.5 kg (173 lb)   SpO2 99%   BMI 28.79 kg/m²         BMI Readings from Last 3 Encounters:   11/28/23 28.79 kg/m²   09/20/23 28.96 kg/m²   08/19/23 28.99 kg/m²       Physical Exam  Vitals reviewed.   Constitutional:       Appearance: She is well-developed.   Neck:      Thyroid: No thyromegaly.      Vascular: No JVD.   Cardiovascular:      Rate and Rhythm: Normal rate.   Pulmonary:      Effort: Respiratory distress present.      Breath sounds: Wheezing present.   Musculoskeletal:         General: Normal range of " motion.      Cervical back: Normal range of motion.      Comments: Gait was normal.   Skin:     General: Skin is warm and dry.   Neurological:      Mental Status: She is alert and oriented to person, place, and time.   Psychiatric:         Behavior: Behavior normal.             Assessment & Plan   Diagnoses and all orders for this visit:    1. Severe persistent asthma with acute exacerbation (Primary)  -     Respiratory Panel PCR w/COVID-19(SARS-CoV-2) [facility] In-House, NP Swab in UTM/VTM, 2 HR TAT - Swab, Nasopharynx; Future  -     Cancel: POC Influenza A / B  -     Nitric Oxide  -     methylPREDNISolone acetate (DEPO-medrol) injection 80 mg  -     Peak Flow  -     Influenza A & B, KETTY - Swab, Nasopharynx; Future    2. Other allergic rhinitis           Return for Keep appt already in system.    DISCUSSION (if any):  FeNO level was 61 today.    Peak flow was 250 LPM today.    Laboratory workup was also reviewed which showed   Lab Results   Component Value Date    EOSABS 0.63 (H) 08/19/2023    EOSABS 1.48 (H) 03/02/2023    EOSABS 0.01 02/05/2023    &   Lab Results   Component Value Date    IGE 1099 (H) 05/19/2023     Laboratory workup also showed   Lab Results   Component Value Date    CO2 21.4 (L) 08/19/2023     ===========================  ===========================    If her symptoms continue to suggest moderate to severe poorly controlled asthma, and/or if she has documented exacerbations, then she may need to be considered for newer therapies, especially given her elevated IgE and Eosinophils.     Repeat CBC and IgE may be needed.     For the symptoms of acute exacerbation of asthma, she was prescribed intramuscular steroids.    Patient may be prescribed antibiotics, if she does not improve or develops fever.    The patient was asked to start using her rescue medications on a more regular basis for the next few days.    Side effects have been discussed in detail.    Patient was asked to report  to the emergency room if symptoms do not improve.    All other medications will remain the same.    Patient was advised to continue her nasal spray, especially given improvement in symptoms overall.    We will also ask her to undergo CoVid and Influenza testing, given her description of symptoms including mild fever and chills.       Dictated utilizing Dragon dictation.    This document was electronically signed by Carlton Tobias MD on 11/28/23 at 12:04 EST

## 2023-12-28 ENCOUNTER — OFFICE VISIT (OUTPATIENT)
Dept: PULMONOLOGY | Facility: CLINIC | Age: 43
End: 2023-12-28
Payer: COMMERCIAL

## 2023-12-28 VITALS
OXYGEN SATURATION: 98 % | HEART RATE: 77 BPM | BODY MASS INDEX: 30.83 KG/M2 | SYSTOLIC BLOOD PRESSURE: 110 MMHG | HEIGHT: 63 IN | DIASTOLIC BLOOD PRESSURE: 64 MMHG | WEIGHT: 174 LBS

## 2023-12-28 DIAGNOSIS — J45.50 SEVERE PERSISTENT ASTHMA WITHOUT COMPLICATION: Primary | ICD-10-CM

## 2023-12-28 DIAGNOSIS — J30.89 OTHER ALLERGIC RHINITIS: ICD-10-CM

## 2023-12-28 PROCEDURE — 99214 OFFICE O/P EST MOD 30 MIN: CPT | Performed by: NURSE PRACTITIONER

## 2023-12-28 RX ORDER — MONTELUKAST SODIUM 10 MG/1
10 TABLET ORAL NIGHTLY
Qty: 30 TABLET | Refills: 5 | Status: SHIPPED | OUTPATIENT
Start: 2023-12-28

## 2023-12-28 RX ORDER — HYDROCODONE BITARTRATE AND ACETAMINOPHEN 5; 325 MG/1; MG/1
1 TABLET ORAL 3 TIMES DAILY PRN
COMMUNITY
Start: 2023-11-30

## 2023-12-28 RX ORDER — PREDNISONE 20 MG/1
40 TABLET ORAL DAILY
Qty: 10 TABLET | Refills: 0 | Status: SHIPPED | OUTPATIENT
Start: 2023-12-28

## 2023-12-28 NOTE — PROGRESS NOTES
"     Follow Up Office Visit      Patient Name: Marlin Silva    Chief Complaint:    Chief Complaint   Patient presents with    Breathing Problem       History of Present Illness: Marlin Silva is a 43 y.o. female who is here today for follow up of asthma.  After last visit, she notes that symptomatically improved with exacerbation treatment.  She denies having any recurrent exacerbations.  She is currently doing well on Breo and Spiriva.  Rare albuterol use.  No current cough or wheezing.  She has been able to exercise.    Supplemental Oxygen: No    Subjective      Review of Systems:  Review of Systems   Constitutional:  Negative for fever and unexpected weight change.   Respiratory:  Positive for shortness of breath. Negative for cough and wheezing.    Cardiovascular:  Negative for chest pain and leg swelling.   Allergic/Immunologic: Positive for environmental allergies.        Past Medical History:   Past Medical History:   Diagnosis Date    Arthritis     Asthma     Depression     Dysphagia     REPORTS \"IT'S LIKE THINGS WON'T GO DOWN SOMETIMES\"    Fibromyalgia     GERD (gastroesophageal reflux disease)     PATIENT REPORTS HAD ER VISIT IN DECEMBER OF 2016 FOR RIGHT SIDED PAIN.  PATIENT THOUGHT MAY BE HEART RELATED BUT REPORTS WAS TOLD ONLY REFLUX.    History of bronchitis     History of kidney stones     REPORTS PASSED WITHOUT SURGICAL INTERVENTION IN SUMMER OF 2017    History of panic attacks     History of pneumonia     Insomnia     MRSA (methicillin resistant Staphylococcus aureus) 2008    REPORTS IN SINUS CAVITY AND THAT SHE WAS TREATED    Neck pain     TIA (transient ischemic attack)     REPORTS HX OF TIA 8 YEARS AGO AND THAT SHE WAS TAKEN OFF OF BCP.  NO RESIDUAL NOTED    Wears glasses     FOR READING       Past Surgical History:   Past Surgical History:   Procedure Laterality Date    BREAST AUGMENTATION      COLONOSCOPY      DILATION AND CURETTAGE, DIAGNOSTIC / THERAPEUTIC      ENDOSCOPY      " INGUINAL HERNIA REPAIR Right 11/20/2017    Procedure: REPAIR OF INCARCERATED RIGHT FEMORAL HERNIA, PRIMARY REPAIR OF BLADDER;  Surgeon: Jennifer Torrez MD;  Location: [facility];  Service:     SINUS SURGERY      WISDOM TOOTH EXTRACTION         Family History:   Family History   Problem Relation Age of Onset    Hypertension Father     Cancer Maternal Aunt     Diabetes Paternal Uncle        Social History:   Social History     Socioeconomic History    Marital status:    Tobacco Use    Smoking status: Never     Passive exposure: Never    Smokeless tobacco: Never   Vaping Use    Vaping Use: Never used   Substance and Sexual Activity    Alcohol use: Not Currently     Comment: SOCIAL USE ONLY, NO HX OF ABUSE REPORTED    Drug use: No    Sexual activity: Defer       Current Medications:     Current Outpatient Medications:     albuterol (PROVENTIL HFA;VENTOLIN HFA) 108 (90 Base) MCG/ACT inhaler, Inhale 2 puffs Every 4 (Four) Hours As Needed for Wheezing., Disp: , Rfl:     albuterol (PROVENTIL) (5 MG/ML) 0.5% nebulizer solution, Take 0.5 mL by nebulization Every 6 (Six) Hours As Needed for Wheezing., Disp: , Rfl:     ARIPiprazole (ABILIFY) 5 MG tablet, Take 2 mg by mouth Daily., Disp: , Rfl:     azelastine (ASTELIN) 0.1 % nasal spray, 1 spray into the nostril(s) as directed by provider 2 (Two) Times a Day As Needed for Rhinitis or Allergies. Use in each nostril as directed, Disp: 1 each, Rfl: 5    clonazePAM (KlonoPIN) 1 MG tablet, Take 1 tablet by mouth Every Night., Disp: , Rfl:     diphenhydrAMINE (BENADRYL) 25 mg capsule, Take 1 capsule by mouth At Night As Needed., Disp: , Rfl:     Fluticasone Furoate-Vilanterol (BREO ELLIPTA) 200-25 MCG/ACT inhaler, Inhale 1 puff Daily. Rinse mouth with water after use., Disp: 60 each, Rfl: 5    HYDROcodone-acetaminophen (NORCO) 5-325 MG per tablet, Take 1 tablet by mouth 3 (Three) Times a Day As Needed., Disp: , Rfl:     ibuprofen (ADVIL,MOTRIN) 600 MG tablet, Take 1 tablet by  "mouth As Needed for Mild Pain., Disp: , Rfl:     lamoTRIgine (LaMICtal ODT) 200 MG tablet dispersible disintegrating tablet, Place 1 tablet on the tongue Daily., Disp: , Rfl:     Loratadine-Pseudoephedrine (CLARITIN-D 24 HOUR PO), Take 1 tablet by mouth Daily As Needed., Disp: , Rfl:     temazepam (RESTORIL) 15 MG capsule, Take 1 capsule by mouth every night at bedtime., Disp: , Rfl:     Tiotropium Bromide Monohydrate (Spiriva Respimat) 1.25 MCG/ACT aerosol solution inhaler, Inhale 2 puffs Daily., Disp: 1 each, Rfl: 5    traMADol (ULTRAM) 50 MG tablet, Take 1 tablet by mouth Every 6 (Six) Hours As Needed for Moderate Pain., Disp: , Rfl:     promethazine (PHENERGAN) 25 MG tablet, TAKE ONE TABLET BY MOUTH EVERY 4 HOURS AS NEEDED FOR NAUSEA AND VOMITING (Patient not taking: Reported on 9/20/2023), Disp: , Rfl:      Allergies:   Allergies   Allergen Reactions    Bactrim [Sulfamethoxazole-Trimethoprim] Dizziness     Severe shaking      Sulfa Antibiotics Other (See Comments)     REPORTS CAUSED HAND TREMORS AND HEART PALPITATIONS         Objective     Physical Exam:  Vital Signs:   Vitals:    12/28/23 1316   BP: 110/64   Pulse: 77   SpO2: 98%   Weight: 78.9 kg (174 lb)   Height: 160 cm (63\")     Body mass index is 30.82 kg/m².    Physical Exam  Vitals reviewed.   Constitutional:       General: She is not in acute distress.     Appearance: She is not toxic-appearing.   HENT:      Head: Normocephalic and atraumatic.      Mouth/Throat:      Mouth: Mucous membranes are moist.   Eyes:      Conjunctiva/sclera: Conjunctivae normal.   Cardiovascular:      Rate and Rhythm: Normal rate.      Heart sounds: Normal heart sounds.   Pulmonary:      Effort: Pulmonary effort is normal.      Breath sounds: Normal breath sounds.   Abdominal:      General: There is no distension.      Palpations: Abdomen is soft.   Musculoskeletal:         General: No swelling.      Cervical back: Neck supple.   Skin:     General: Skin is warm and dry.      " Findings: No rash.   Neurological:      General: No focal deficit present.      Mental Status: She is alert and oriented to person, place, and time.   Psychiatric:         Mood and Affect: Mood normal.         Behavior: Behavior normal.       WBC   Date Value Ref Range Status   08/19/2023 7.33 3.40 - 10.80 10*3/mm3 Final     RBC   Date Value Ref Range Status   08/19/2023 4.94 3.77 - 5.28 10*6/mm3 Final     Hemoglobin   Date Value Ref Range Status   08/19/2023 13.2 12.0 - 15.9 g/dL Final     Hematocrit   Date Value Ref Range Status   08/19/2023 38.5 34.0 - 46.6 % Final     MCV   Date Value Ref Range Status   08/19/2023 77.9 (L) 79.0 - 97.0 fL Final     MCH   Date Value Ref Range Status   08/19/2023 26.7 26.6 - 33.0 pg Final     MCHC   Date Value Ref Range Status   08/19/2023 34.3 31.5 - 35.7 g/dL Final     RDW   Date Value Ref Range Status   08/19/2023 15.3 12.3 - 15.4 % Final     RDW-SD   Date Value Ref Range Status   08/19/2023 41.8 37.0 - 54.0 fl Final     MPV   Date Value Ref Range Status   08/19/2023 8.9 6.0 - 12.0 fL Final     Platelets   Date Value Ref Range Status   08/19/2023 408 140 - 450 10*3/mm3 Final     Neutrophil %   Date Value Ref Range Status   08/19/2023 57.4 42.7 - 76.0 % Final     Lymphocyte %   Date Value Ref Range Status   08/19/2023 25.4 19.6 - 45.3 % Final     Monocyte %   Date Value Ref Range Status   08/19/2023 6.8 5.0 - 12.0 % Final     Eosinophil %   Date Value Ref Range Status   08/19/2023 8.6 (H) 0.3 - 6.2 % Final     Basophil %   Date Value Ref Range Status   08/19/2023 1.4 0.0 - 1.5 % Final     Immature Grans %   Date Value Ref Range Status   08/19/2023 0.4 0.0 - 0.5 % Final     Neutrophils, Absolute   Date Value Ref Range Status   08/19/2023 4.21 1.70 - 7.00 10*3/mm3 Final     Lymphocytes, Absolute   Date Value Ref Range Status   08/19/2023 1.86 0.70 - 3.10 10*3/mm3 Final     Monocytes, Absolute   Date Value Ref Range Status   08/19/2023 0.50 0.10 - 0.90 10*3/mm3 Final      Eosinophils, Absolute   Date Value Ref Range Status   08/19/2023 0.63 (H) 0.00 - 0.40 10*3/mm3 Final     Basophils, Absolute   Date Value Ref Range Status   08/19/2023 0.10 0.00 - 0.20 10*3/mm3 Final     Immature Grans, Absolute   Date Value Ref Range Status   08/19/2023 0.03 0.00 - 0.05 10*3/mm3 Final     nRBC   Date Value Ref Range Status   08/19/2023 0.0 0.0 - 0.2 /100 WBC Final     Assessment / Plan      Assessment/Plan:   Diagnoses and all orders for this visit:    1. Severe persistent asthma without complication (Primary)  -     predniSONE (DELTASONE) 20 MG tablet; Take 2 tablets by mouth Daily.  Dispense: 10 tablet; Refill: 0  -     montelukast (SINGULAIR) 10 MG tablet; Take 1 tablet by mouth Every Night.  Dispense: 30 tablet; Refill: 5  -     Alpha - 1 - Antitrypsin Deficiency; Future  Stable symptoms currently. Continue current inhaled regimen. We discussed the risk and benefits of inhaled corticosteroids. Patient instructed to take them on a regular basis as prescribed. Patient instructed to rinse their mouth out after each use. Will send in a course of prednisone for patient to have on hand in the event of another exacerbation. May need to consider an asthma biologic in the future.    2. Other allergic rhinitis  -     montelukast (SINGULAIR) 10 MG tablet; Take 1 tablet by mouth Every Night.  Dispense: 30 tablet; Refill: 5  Discussed possible side effects of medications. Patient instructed to call clinic if they develop any new symptoms with starting medications. Risk and benefits of the medication were discussed with patient. Patient advised to stop taking the medicine if they experienced any mood disturbances.        Follow Up:   April 2024 as previously scheduled  The patient was counseled on diagnostic results, risks and benefits of treatment options, risk factor modifications and the importance of treatment compliance. The patient was advised to contact the clinic with concerns or worsening  symptoms.     Irma Castellanos, LUISA   Pulmonary Medicine [facility]

## 2024-01-16 DIAGNOSIS — J45.50 SEVERE PERSISTENT ASTHMA WITHOUT COMPLICATION: ICD-10-CM

## 2024-01-26 ENCOUNTER — OFFICE VISIT (OUTPATIENT)
Dept: OBSTETRICS AND GYNECOLOGY | Facility: CLINIC | Age: 44
End: 2024-01-26
Payer: COMMERCIAL

## 2024-01-26 VITALS
SYSTOLIC BLOOD PRESSURE: 132 MMHG | HEIGHT: 63 IN | WEIGHT: 178 LBS | BODY MASS INDEX: 31.54 KG/M2 | DIASTOLIC BLOOD PRESSURE: 78 MMHG

## 2024-01-26 DIAGNOSIS — N92.1 MENORRHAGIA WITH IRREGULAR CYCLE: Primary | ICD-10-CM

## 2024-01-26 DIAGNOSIS — Z12.4 SCREENING FOR CERVICAL CANCER: ICD-10-CM

## 2024-01-26 PROCEDURE — 99203 OFFICE O/P NEW LOW 30 MIN: CPT | Performed by: PHYSICIAN ASSISTANT

## 2024-01-26 NOTE — PROGRESS NOTES
"Subjective   Chief Complaint   Patient presents with    Menstrual Problem     Irregular periods since 2020       Marlin Silva is a 43 y.o. year old No obstetric history on file. presenting to be seen for irregular menses.  Reports menses became irregular in 2021 and thought initially due to Covid vaccine  Cycles occur q 14 to 28 days. Flow lasts 7 days. Has not been anemic that she knows of  LMP 1/23/2024 and bleeding lightly today. Last pap 10 years ago  She recently remarried. Is not using contraception and may want another child  Reports thyroid screening normal with PCP within the past year  She developed \"seizures\" when previously on ocps and was thought to have had TIA's      Past Medical History:   Diagnosis Date    Arthritis     Asthma     Depression     Dysphagia     REPORTS \"IT'S LIKE THINGS WON'T GO DOWN SOMETIMES\"    Fibromyalgia     GERD (gastroesophageal reflux disease)     PATIENT REPORTS HAD ER VISIT IN DECEMBER OF 2016 FOR RIGHT SIDED PAIN.  PATIENT THOUGHT MAY BE HEART RELATED BUT REPORTS WAS TOLD ONLY REFLUX.    History of bronchitis     History of kidney stones     REPORTS PASSED WITHOUT SURGICAL INTERVENTION IN SUMMER OF 2017    History of panic attacks     History of pneumonia     Insomnia     MRSA (methicillin resistant Staphylococcus aureus) 2008    REPORTS IN SINUS CAVITY AND THAT SHE WAS TREATED    Neck pain     TIA (transient ischemic attack)     REPORTS HX OF TIA 8 YEARS AGO AND THAT SHE WAS TAKEN OFF OF BCP.  NO RESIDUAL NOTED    Wears glasses     FOR READING        Current Outpatient Medications:     albuterol (PROVENTIL HFA;VENTOLIN HFA) 108 (90 Base) MCG/ACT inhaler, Inhale 2 puffs Every 4 (Four) Hours As Needed for Wheezing., Disp: , Rfl:     albuterol (PROVENTIL) (5 MG/ML) 0.5% nebulizer solution, Take 0.5 mL by nebulization Every 6 (Six) Hours As Needed for Wheezing., Disp: , Rfl:     ARIPiprazole (ABILIFY) 5 MG tablet, Take 2 mg by mouth Daily., Disp: , Rfl:     azelastine " (ASTELIN) 0.1 % nasal spray, 1 spray into the nostril(s) as directed by provider 2 (Two) Times a Day As Needed for Rhinitis or Allergies. Use in each nostril as directed, Disp: 1 each, Rfl: 5    clonazePAM (KlonoPIN) 1 MG tablet, Take 1 tablet by mouth Every Night., Disp: , Rfl:     diphenhydrAMINE (BENADRYL) 25 mg capsule, Take 1 capsule by mouth At Night As Needed., Disp: , Rfl:     Fluticasone Furoate-Vilanterol (BREO ELLIPTA) 200-25 MCG/ACT inhaler, Inhale 1 puff Daily. Rinse mouth with water after use., Disp: 60 each, Rfl: 5    HYDROcodone-acetaminophen (NORCO) 5-325 MG per tablet, Take 1 tablet by mouth 3 (Three) Times a Day As Needed., Disp: , Rfl:     ibuprofen (ADVIL,MOTRIN) 600 MG tablet, Take 1 tablet by mouth As Needed for Mild Pain., Disp: , Rfl:     lamoTRIgine (LaMICtal ODT) 200 MG tablet dispersible disintegrating tablet, Place 1 tablet on the tongue Daily., Disp: , Rfl:     Loratadine-Pseudoephedrine (CLARITIN-D 24 HOUR PO), Take 1 tablet by mouth Daily As Needed., Disp: , Rfl:     montelukast (SINGULAIR) 10 MG tablet, Take 1 tablet by mouth Every Night., Disp: 30 tablet, Rfl: 5    predniSONE (DELTASONE) 20 MG tablet, Take 2 tablets by mouth Daily., Disp: 10 tablet, Rfl: 0    promethazine (PHENERGAN) 25 MG tablet, , Disp: , Rfl:     temazepam (RESTORIL) 15 MG capsule, Take 1 capsule by mouth every night at bedtime., Disp: , Rfl:     Tiotropium Bromide Monohydrate (Spiriva Respimat) 1.25 MCG/ACT aerosol solution inhaler, Inhale 2 puffs Daily., Disp: 1 each, Rfl: 5    traMADol (ULTRAM) 50 MG tablet, Take 1 tablet by mouth Every 6 (Six) Hours As Needed for Moderate Pain., Disp: , Rfl:    Allergies   Allergen Reactions    Bactrim [Sulfamethoxazole-Trimethoprim] Dizziness     Severe shaking      Sulfa Antibiotics Other (See Comments)     REPORTS CAUSED HAND TREMORS AND HEART PALPITATIONS        Past Surgical History:   Procedure Laterality Date    BREAST AUGMENTATION      COLONOSCOPY      DILATION AND  "CURETTAGE, DIAGNOSTIC / THERAPEUTIC      ENDOSCOPY      INGUINAL HERNIA REPAIR Right 11/20/2017    Procedure: REPAIR OF INCARCERATED RIGHT FEMORAL HERNIA, PRIMARY REPAIR OF BLADDER;  Surgeon: Jennifer Torrez MD;  Location: [facility];  Service:     SINUS SURGERY      WISDOM TOOTH EXTRACTION        Social History     Socioeconomic History    Marital status:    Tobacco Use    Smoking status: Never     Passive exposure: Never    Smokeless tobacco: Never   Vaping Use    Vaping Use: Never used   Substance and Sexual Activity    Alcohol use: Not Currently     Comment: SOCIAL USE ONLY, NO HX OF ABUSE REPORTED    Drug use: No    Sexual activity: Defer      Family History   Problem Relation Age of Onset    Hypertension Father     Cancer Maternal Aunt     Diabetes Paternal Uncle        Review of Systems   Constitutional:  Negative for chills, diaphoresis and fever.   Gastrointestinal: Negative.    Genitourinary:  Positive for menstrual problem and vaginal bleeding. Negative for difficulty urinating and dysuria.           Objective   /78   Ht 160 cm (63\")   Wt 80.7 kg (178 lb)   BMI 31.53 kg/m²     Physical Exam  Exam conducted with a chaperone present.   Constitutional:       Appearance: Normal appearance. She is well-developed and well-groomed.   Eyes:      General: Lids are normal.      Extraocular Movements: Extraocular movements intact.      Conjunctiva/sclera: Conjunctivae normal.   Genitourinary:     Labia:         Right: No rash, tenderness or lesion.         Left: No rash, tenderness or lesion.       Urethra: No prolapse, urethral pain, urethral swelling or urethral lesion.      Vagina: Bleeding present. No vaginal discharge, tenderness or lesions.      Cervix: No cervical motion tenderness or lesion.      Uterus: Not enlarged and not tender.       Adnexa:         Right: No mass or tenderness.          Left: No mass or tenderness.        Comments: Light bleeding observed  Pap done  Neurological:      " General: No focal deficit present.      Mental Status: She is alert and oriented to person, place, and time.   Psychiatric:         Attention and Perception: Attention normal.         Mood and Affect: Mood normal.         Speech: Speech normal.         Behavior: Behavior is cooperative.            Result Review :           CBC & Differential (08/19/2023 22:37)         Assessment and Plan  Diagnoses and all orders for this visit:    1. Menorrhagia with irregular cycle (Primary)  -     Estradiol  -     Follicle Stimulating Hormone  -     Luteinizing Hormone  -     Progesterone  -     HCG, B-subunit, Quantitative ([facility] Only)  -     US Non-ob Transvaginal    2. Screening for cervical cancer  -     LIQUID-BASED PAP SMEAR WITH HPV GENOTYPING REGARDLESS OF INTERPRETATION ([facility])      Patient Instructions   Check labs  RTO for pelvic ultrasound  Further evaluation/management pending results           This note was electronically signed.    Sara Ziegler PA-C   January 26, 2024

## 2024-02-01 LAB — REF LAB TEST METHOD: NORMAL

## 2024-02-09 ENCOUNTER — DOCUMENTATION (OUTPATIENT)
Dept: OBSTETRICS AND GYNECOLOGY | Facility: CLINIC | Age: 44
End: 2024-02-09
Payer: COMMERCIAL

## 2024-02-09 LAB
ESTRADIOL SERPL-MCNC: 95.7 PG/ML
FSH SERPL-ACNC: 9.6 MIU/ML
HCG INTACT+B SERPL-ACNC: <1 MIU/ML
LH SERPL-ACNC: 10.1 MIU/ML
PROGEST SERPL-MCNC: 11.8 NG/ML

## 2024-02-09 NOTE — PROGRESS NOTES
Patient informed of results of pelvic ultrasound done yesterday.  Uterus is anteverted.  Right ovary is normal.  Left ovary with a 2.2 cm complex cyst.  Both ovaries with vascular flow and no free fluid.  Recommend a follow-up ultrasound in 8 weeks for surveillance of the small complex cyst on the left ovary.  Patient is transferred to the front office to schedule follow-up ultrasound in 8 weeks.  She stated she had her blood work drawn yesterday when she came in for the ultrasound.  Will contact her with those results when available.

## 2024-04-18 ENCOUNTER — OFFICE VISIT (OUTPATIENT)
Dept: PULMONOLOGY | Facility: CLINIC | Age: 44
End: 2024-04-18
Payer: COMMERCIAL

## 2024-04-18 VITALS
HEART RATE: 74 BPM | DIASTOLIC BLOOD PRESSURE: 74 MMHG | HEIGHT: 63 IN | OXYGEN SATURATION: 99 % | SYSTOLIC BLOOD PRESSURE: 124 MMHG | WEIGHT: 170.4 LBS | BODY MASS INDEX: 30.19 KG/M2 | RESPIRATION RATE: 18 BRPM

## 2024-04-18 DIAGNOSIS — J45.50 SEVERE PERSISTENT ASTHMA WITHOUT COMPLICATION: Primary | ICD-10-CM

## 2024-04-18 DIAGNOSIS — J30.89 OTHER ALLERGIC RHINITIS: ICD-10-CM

## 2024-04-18 PROCEDURE — 99214 OFFICE O/P EST MOD 30 MIN: CPT | Performed by: INTERNAL MEDICINE

## 2024-04-18 RX ORDER — AZELASTINE 1 MG/ML
1 SPRAY, METERED NASAL 2 TIMES DAILY PRN
Qty: 1 EACH | Refills: 5 | Status: SHIPPED | OUTPATIENT
Start: 2024-04-18

## 2024-04-18 NOTE — PROGRESS NOTES
"  Chief Complaint   Patient presents with    Shortness of Breath    Follow-up       Subjective   Marlinsushma Silva is a 43 y.o. female.   Patient comes in today to follow-up on asthma and allergic rhinitis.    She says that her symptoms are doing fairly well and she is actually not needed albuterol in at least 4 to 6 weeks.    She denies any nighttime symptoms or episodes of exacerbations.    She did however find out that she was pregnant, at the end of March and thinks that she is almost 15 to 16 weeks.    She is using her nasal spray without any issues.    The following portions of the patient's history were reviewed and updated as appropriate: allergies, current medications, past family history, past medical history, past social history, and past surgical history.    Review of Systems   HENT:  Positive for sinus pressure and sneezing. Negative for sore throat.    Respiratory:  Positive for cough, shortness of breath and wheezing. Negative for chest tightness.    Psychiatric/Behavioral:  Negative for sleep disturbance.        Objective   Visit Vitals  /74   Pulse 74   Resp 18   Ht 160 cm (62.99\") Comment: pt reported   Wt 77.3 kg (170 lb 6.4 oz)   SpO2 99%   BMI 30.19 kg/m²         BMI Readings from Last 3 Encounters:   04/18/24 30.19 kg/m²   01/26/24 31.53 kg/m²   12/28/23 30.82 kg/m²       Physical Exam  Vitals reviewed.   Constitutional:       Appearance: She is well-developed.   HENT:      Nose:      Comments: Nasal erythema noted.     Mouth/Throat:      Comments: Oropharynx was somewhat cobblestoned.  Neck:      Vascular: No JVD.   Pulmonary:      Effort: Pulmonary effort is normal. No respiratory distress.      Breath sounds: Normal breath sounds. No wheezing or rales.   Musculoskeletal:      Cervical back: Neck supple.      Comments: Gait was normal   Skin:     General: Skin is warm and dry.   Neurological:      Mental Status: She is alert and oriented to person, place, and time.         Assessment " & Plan   Diagnoses and all orders for this visit:    1. Severe persistent asthma without complication (Primary)    2. Other allergic rhinitis    Other orders  -     azelastine (ASTELIN) 0.1 % nasal spray; 1 spray into the nostril(s) as directed by provider 2 (Two) Times a Day As Needed for Rhinitis or Allergies. Use in each nostril as directed  Dispense: 1 each; Refill: 5           Return in about 3 months (around 7/18/2024) for Recheck, For Irma Arias), ....Also 8-9 mths w/ Dr. Tobias.    DISCUSSION (if any):  It appears that her symptoms of asthma are under adequate control with the current regimen.    I told the patient that based on all the recent guidelines, treatment of asthma in pregnancy is almost the same as clinically and the patient especially since poorly controlled asthma is definitely worse for maternal and fetal health, compared to slight chance of side effects from the medications.    It also appears that most of the medications used for general treatment of asthma are safe in pregnancy.    Patient was informed about the black box warning for Singulair regarding suicidal thoughts and tendencies.  she denies any ongoing issues for now.     Since the patient is doing fairly well as far as respiratory symptoms are concerned, I have recommended that she discontinued using Spiriva    The patient was asked to restart Spiriva on a regular basis if her symptoms worsen or if she requires her rescue inhaler more than 4 to 5 times a week or if she has any night time symptoms or if she has any exacerbation while she is not on regular schedule dose of a maintenance inhaler.     Compliance with medications stressed.     Side effects of prescribed medications discussed with the patient.    The need to continue to be aware of triggers that may cause asthma exacerbation versus progression of disease, was also discussed.    I have discussed asthma action plan with her.    The patient was asked to call this  office if the symptoms worsen.    Patient was advised to continue her nasal spray, especially given improvement in symptoms overall.    Patient was advised to continue her nasal spray on a seasonal basis, since her symptoms are consistent with seasonal allergic rhinitis.      Dictated utilizing Dragon dictation.    This document was electronically signed by Carlton Tobias MD on 04/18/24 at 15:36 EDT

## 2024-04-24 ENCOUNTER — INITIAL PRENATAL (OUTPATIENT)
Dept: OBSTETRICS AND GYNECOLOGY | Facility: CLINIC | Age: 44
End: 2024-04-24
Payer: COMMERCIAL

## 2024-04-24 VITALS — DIASTOLIC BLOOD PRESSURE: 70 MMHG | BODY MASS INDEX: 30.23 KG/M2 | WEIGHT: 170.6 LBS | SYSTOLIC BLOOD PRESSURE: 120 MMHG

## 2024-04-24 DIAGNOSIS — Z34.90 PREGNANCY, UNSPECIFIED GESTATIONAL AGE: ICD-10-CM

## 2024-04-24 DIAGNOSIS — N91.2 AMENORRHEA: Primary | ICD-10-CM

## 2024-04-24 LAB
B-HCG UR QL: POSITIVE
EXPIRATION DATE: ABNORMAL
INTERNAL NEGATIVE CONTROL: ABNORMAL
INTERNAL POSITIVE CONTROL: ABNORMAL
Lab: ABNORMAL

## 2024-04-24 PROCEDURE — 81025 URINE PREGNANCY TEST: CPT | Performed by: OBSTETRICS & GYNECOLOGY

## 2024-04-24 PROCEDURE — 99213 OFFICE O/P EST LOW 20 MIN: CPT | Performed by: OBSTETRICS & GYNECOLOGY

## 2024-04-24 NOTE — PROGRESS NOTES
Subjective   Chief Complaint   Patient presents with    Initial Prenatal Visit     New OB patient with TVS, UDS and cultures done today. No problems or concerns     Marlin Silva is a 43 y.o. year old .  Patient's last menstrual period was 2023.  She presents to be seen because of new OB..     OTHER COMPLAINTS:  Prior term vaginal delivery x 3.  Proven to 8 pounds 3 ounces.  Last delivery approximate 13 years ago.      The following portions of the patient's history were reviewed and updated as appropriate:She  has a past medical history of Arthritis, Asthma, Depression, Dysphagia, Fibromyalgia, GERD (gastroesophageal reflux disease), History of bronchitis, History of kidney stones, History of panic attacks, History of pneumonia, Insomnia, MRSA (methicillin resistant Staphylococcus aureus) (), Neck pain, TIA (transient ischemic attack), and Wears glasses.  She does not have any pertinent problems on file.  She  has a past surgical history that includes Sinus surgery; Breast Augmentation; Dilation and curettage, diagnostic / therapeutic; Esophagogastroduodenoscopy; Colonoscopy; [place] tooth extraction; and Inguinal Hernia Repair (Right, 2017).  Her family history includes Cancer in her maternal aunt; Diabetes in her paternal uncle; Hypertension in her father.  She  reports that she has never smoked. She has never been exposed to tobacco smoke. She has never used smokeless tobacco. She reports that she does not currently use alcohol. She reports that she does not use drugs.  Current Outpatient Medications   Medication Sig Dispense Refill    albuterol (PROVENTIL HFA;VENTOLIN HFA) 108 (90 Base) MCG/ACT inhaler Inhale 2 puffs Every 4 (Four) Hours As Needed for Wheezing.      albuterol (PROVENTIL) (5 MG/ML) 0.5% nebulizer solution Take 0.5 mL by nebulization Every 6 (Six) Hours As Needed for Wheezing.      ARIPiprazole (ABILIFY) 5 MG tablet Take 2 mg by mouth Daily.       azelastine (ASTELIN) 0.1 % nasal spray 1 spray into the nostril(s) as directed by provider 2 (Two) Times a Day As Needed for Rhinitis or Allergies. Use in each nostril as directed 1 each 5    clonazePAM (KlonoPIN) 1 MG tablet Take 1 tablet by mouth Every Night.      diphenhydrAMINE (BENADRYL) 25 mg capsule Take 1 capsule by mouth At Night As Needed.      Fluticasone Furoate-Vilanterol (BREO ELLIPTA) 200-25 MCG/ACT inhaler Inhale 1 puff Daily. Rinse mouth with water after use. 60 each 5    lamoTRIgine (LaMICtal ODT) 200 MG tablet dispersible disintegrating tablet Place 1 tablet on the tongue Daily.      montelukast (SINGULAIR) 10 MG tablet Take 1 tablet by mouth Every Night. 30 tablet 5    temazepam (RESTORIL) 15 MG capsule Take 1 capsule by mouth every night at bedtime.      HYDROcodone-acetaminophen (NORCO) 5-325 MG per tablet Take 1 tablet by mouth 3 (Three) Times a Day As Needed. (Patient not taking: Reported on 4/24/2024)      ibuprofen (ADVIL,MOTRIN) 600 MG tablet Take 1 tablet by mouth As Needed for Mild Pain. (Patient not taking: Reported on 4/24/2024)      Loratadine-Pseudoephedrine (CLARITIN-D 24 HOUR PO) Take 1 tablet by mouth Daily As Needed. (Patient not taking: Reported on 4/24/2024)      predniSONE (DELTASONE) 20 MG tablet Take 2 tablets by mouth Daily. (Patient not taking: Reported on 4/24/2024) 10 tablet 0    promethazine (PHENERGAN) 25 MG tablet  (Patient not taking: Reported on 4/24/2024)      traMADol (ULTRAM) 50 MG tablet Take 1 tablet by mouth Every 6 (Six) Hours As Needed for Moderate Pain. (Patient not taking: Reported on 4/24/2024)       No current facility-administered medications for this visit.     Current Outpatient Medications on File Prior to Visit   Medication Sig    albuterol (PROVENTIL HFA;VENTOLIN HFA) 108 (90 Base) MCG/ACT inhaler Inhale 2 puffs Every 4 (Four) Hours As Needed for Wheezing.    albuterol (PROVENTIL) (5 MG/ML) 0.5% nebulizer solution Take 0.5 mL by nebulization Every  6 (Six) Hours As Needed for Wheezing.    ARIPiprazole (ABILIFY) 5 MG tablet Take 2 mg by mouth Daily.    azelastine (ASTELIN) 0.1 % nasal spray 1 spray into the nostril(s) as directed by provider 2 (Two) Times a Day As Needed for Rhinitis or Allergies. Use in each nostril as directed    clonazePAM (KlonoPIN) 1 MG tablet Take 1 tablet by mouth Every Night.    diphenhydrAMINE (BENADRYL) 25 mg capsule Take 1 capsule by mouth At Night As Needed.    Fluticasone Furoate-Vilanterol (BREO ELLIPTA) 200-25 MCG/ACT inhaler Inhale 1 puff Daily. Rinse mouth with water after use.    lamoTRIgine (LaMICtal ODT) 200 MG tablet dispersible disintegrating tablet Place 1 tablet on the tongue Daily.    montelukast (SINGULAIR) 10 MG tablet Take 1 tablet by mouth Every Night.    temazepam (RESTORIL) 15 MG capsule Take 1 capsule by mouth every night at bedtime.    HYDROcodone-acetaminophen (NORCO) 5-325 MG per tablet Take 1 tablet by mouth 3 (Three) Times a Day As Needed. (Patient not taking: Reported on 4/24/2024)    ibuprofen (ADVIL,MOTRIN) 600 MG tablet Take 1 tablet by mouth As Needed for Mild Pain. (Patient not taking: Reported on 4/24/2024)    Loratadine-Pseudoephedrine (CLARITIN-D 24 HOUR PO) Take 1 tablet by mouth Daily As Needed. (Patient not taking: Reported on 4/24/2024)    predniSONE (DELTASONE) 20 MG tablet Take 2 tablets by mouth Daily. (Patient not taking: Reported on 4/24/2024)    promethazine (PHENERGAN) 25 MG tablet  (Patient not taking: Reported on 4/24/2024)    traMADol (ULTRAM) 50 MG tablet Take 1 tablet by mouth Every 6 (Six) Hours As Needed for Moderate Pain. (Patient not taking: Reported on 4/24/2024)     No current facility-administered medications on file prior to visit.     She is allergic to bactrim [sulfamethoxazole-trimethoprim] and sulfa antibiotics.    Social History    Tobacco Use      Smoking status: Never        Passive exposure: Never      Smokeless tobacco: Never    Review of Systems  Consitutional POS:  nothing reported    NEG: anorexia or night sweats   Gastointestinal POS: nothing reported    NEG: bloating, change in bowel habits, melena, or reflux symptoms   Genitourinary POS: nothing reported    NEG: dysuria or hematuria   Integument POS: nothing reported    NEG: moles that are changing in size, shape, color or rashes   Breast POS: nothing reported    NEG: persistent breast lump, skin dimpling, or nipple discharge         Respiratory: negative  Cardiovascular: negative  GYN:  negative          Objective   /70   Wt 77.4 kg (170 lb 9.6 oz)   LMP 07/19/2023   BMI 30.23 kg/m²     General:  well developed; well nourished  no acute distress   Skin:  No suspicious lesions seen   Thyroid: normal to inspection and palpation   Lungs:  breathing is unlabored  clear to auscultation bilaterally   Heart:  regular rate and rhythm, S1, S2 normal, no murmur, click, rub or gallop   Breasts:  Examined in supine position  Symmetric without masses or skin dimpling  Nipples normal without inversion, lesions or discharge  There are no palpable axillary nodes   Abdomen: soft, non-tender; no masses  no umbilical or inguinal hernias are present  no hepato-splenomegaly   Pelvis: Clinical staff was present for exam  External genitalia:  normal appearance of the external genitalia including Bartholin's and Shillington's glands.  :  urethral meatus normal;  Vaginal:  normal pink mucosa without prolapse or lesions.  Cervix:  normal appearance.  Uterus:  normal size, shape and consistency.  Adnexa:  normal bimanual exam of the adnexa.  Rectal:  digital rectal exam not performed; anus visually normal appearing.     Psychiatric: Alert and oriented ×3, mood and affect appropriate  HEENT: Atraumatic, normocephalic, normal scleral icterus  Extremities: 2+ pulses bilaterally, no edema      Lab Review   PAP    Imaging   Viable intrauterine pregnancy 13 weeks 5 days.  Size is consistent with dates.  Limited anatomy due to gestational age normal.   Normal adnexa.  Corpus luteum on the left.  No masses.  No free fluid       Assessment   Intrauterine pregnancy 30+ weeks.  Limited anatomy normal  Advanced maternal age status declines genetic testing  Seizure disorder followed with neurology in [place].  Last seizure early March.  Followed up with neurology shortly thereafter and Lamictal dosing was increased.  Encouraged to contact neurology to let them know she is pregnant.  Does have an appointment again in July  Medicine list reviewed: Recommend weaning benzodiazepines risks discussed.  Ideally would be off completely by the start of the third trimester     Plan   Cervical cultures taken today.  Prenatal labs ordered.  Anatomy ultrasound 4 weeks.  Diet and exercise    No orders of the defined types were placed in this encounter.         This note was electronically signed.      April 24, 2024

## 2024-04-27 LAB
A VAGINAE DNA VAG QL NAA+PROBE: NORMAL SCORE
ABO GROUP BLD: ABNORMAL
AMPHETAMINES UR QL SCN: NEGATIVE NG/ML
BARBITURATES UR QL SCN: NEGATIVE NG/ML
BASOPHILS # BLD AUTO: 0 X10E3/UL (ref 0–0.2)
BASOPHILS NFR BLD AUTO: 0 %
BENZODIAZ UR QL SCN: NEGATIVE NG/ML
BLD GP AB SCN SERPL QL: NEGATIVE
BVAB2 DNA VAG QL NAA+PROBE: NORMAL SCORE
BZE UR QL SCN: NEGATIVE NG/ML
C ALBICANS DNA VAG QL NAA+PROBE: NEGATIVE
C GLABRATA DNA VAG QL NAA+PROBE: NEGATIVE
C TRACH DNA VAG QL NAA+PROBE: NEGATIVE
CANNABINOIDS UR QL SCN: NEGATIVE NG/ML
CREAT UR-MCNC: 16.9 MG/DL (ref 20–300)
EOSINOPHIL # BLD AUTO: 0.1 X10E3/UL (ref 0–0.4)
EOSINOPHIL NFR BLD AUTO: 1 %
ERYTHROCYTE [DISTWIDTH] IN BLOOD BY AUTOMATED COUNT: 17.4 % (ref 11.7–15.4)
HBA1C MFR BLD: 5 % (ref 4.8–5.6)
HBV SURFACE AG SERPL QL IA: NEGATIVE
HCT VFR BLD AUTO: 35.9 % (ref 34–46.6)
HCV IGG SERPL QL IA: NON REACTIVE
HGB BLD-MCNC: 12.1 G/DL (ref 11.1–15.9)
HIV 1+2 AB+HIV1 P24 AG SERPL QL IA: NON REACTIVE
IMM GRANULOCYTES # BLD AUTO: 0 X10E3/UL (ref 0–0.1)
IMM GRANULOCYTES NFR BLD AUTO: 0 %
LABORATORY COMMENT REPORT: ABNORMAL
LYMPHOCYTES # BLD AUTO: 1.3 X10E3/UL (ref 0.7–3.1)
LYMPHOCYTES NFR BLD AUTO: 14 %
MCH RBC QN AUTO: 27.6 PG (ref 26.6–33)
MCHC RBC AUTO-ENTMCNC: 33.7 G/DL (ref 31.5–35.7)
MCV RBC AUTO: 82 FL (ref 79–97)
MEGA1 DNA VAG QL NAA+PROBE: NORMAL SCORE
METHADONE UR QL SCN: NEGATIVE NG/ML
MONOCYTES # BLD AUTO: 0.5 X10E3/UL (ref 0.1–0.9)
MONOCYTES NFR BLD AUTO: 5 %
N GONORRHOEA DNA VAG QL NAA+PROBE: NEGATIVE
NEUTROPHILS # BLD AUTO: 7.3 X10E3/UL (ref 1.4–7)
NEUTROPHILS NFR BLD AUTO: 80 %
OPIATES UR QL SCN: NEGATIVE NG/ML
OXYCODONE+OXYMORPHONE UR QL SCN: NEGATIVE NG/ML
PCP UR QL: NEGATIVE NG/ML
PH UR: 5.9 [PH] (ref 4.5–8.9)
PLATELET # BLD AUTO: 324 X10E3/UL (ref 150–450)
PROPOXYPH UR QL SCN: NEGATIVE NG/ML
RBC # BLD AUTO: 4.39 X10E6/UL (ref 3.77–5.28)
RH BLD: POSITIVE
RPR SER QL: NON REACTIVE
RUBV IGG SERPL IA-ACNC: 4.63 INDEX
SP GR UR: 1
T VAGINALIS DNA VAG QL NAA+PROBE: NEGATIVE
WBC # BLD AUTO: 9.4 X10E3/UL (ref 3.4–10.8)

## 2024-05-07 ENCOUNTER — TELEPHONE (OUTPATIENT)
Dept: PULMONOLOGY | Facility: CLINIC | Age: 44
End: 2024-05-07

## 2024-05-07 NOTE — TELEPHONE ENCOUNTER
Caller: Marlin Silva    Relationship to patient: Self    Best call back number: [phone]     Patient is needing: NEEDS A PA FOR HER INAHLER    Fluticasone Furoate-Vilanterol (BREO ELLIPTA) 200-25 MCG/ACT inhaler [[id number]] (Order [id number])

## 2024-05-14 RX ORDER — FLUTICASONE FUROATE AND VILANTEROL 200; 25 UG/1; UG/1
1 POWDER RESPIRATORY (INHALATION) DAILY
Qty: 60 EACH | Refills: 5 | Status: SHIPPED | OUTPATIENT
Start: 2024-05-14

## 2024-05-14 NOTE — TELEPHONE ENCOUNTER
Patient returned my call and is still taking the Breo inhaler. She stated that it is the only one that Dr. Tobias left her on at her visit. Will send in patient refills.

## 2024-05-20 ENCOUNTER — ROUTINE PRENATAL (OUTPATIENT)
Dept: OBSTETRICS AND GYNECOLOGY | Facility: CLINIC | Age: 44
End: 2024-05-20
Payer: COMMERCIAL

## 2024-05-20 VITALS — BODY MASS INDEX: 31.18 KG/M2 | DIASTOLIC BLOOD PRESSURE: 70 MMHG | SYSTOLIC BLOOD PRESSURE: 122 MMHG | WEIGHT: 176 LBS

## 2024-05-20 DIAGNOSIS — O99.352 SEIZURE DISORDER DURING PREGNANCY IN SECOND TRIMESTER: ICD-10-CM

## 2024-05-20 DIAGNOSIS — O99.512 ASTHMA AFFECTING PREGNANCY IN SECOND TRIMESTER: ICD-10-CM

## 2024-05-20 DIAGNOSIS — Z34.92 PRENATAL CARE IN SECOND TRIMESTER: Primary | ICD-10-CM

## 2024-05-20 DIAGNOSIS — Z36.89 ENCOUNTER FOR FETAL ANATOMIC SURVEY: ICD-10-CM

## 2024-05-20 DIAGNOSIS — O09.522 MULTIGRAVIDA OF ADVANCED MATERNAL AGE IN SECOND TRIMESTER: ICD-10-CM

## 2024-05-20 DIAGNOSIS — G40.909 SEIZURE DISORDER DURING PREGNANCY IN SECOND TRIMESTER: ICD-10-CM

## 2024-05-20 DIAGNOSIS — J45.909 ASTHMA AFFECTING PREGNANCY IN SECOND TRIMESTER: ICD-10-CM

## 2024-05-20 PROCEDURE — 0502F SUBSEQUENT PRENATAL CARE: CPT | Performed by: STUDENT IN AN ORGANIZED HEALTH CARE EDUCATION/TRAINING PROGRAM

## 2024-05-20 NOTE — PROGRESS NOTES
Prenatal Care Visit    Subjective   Chief Complaint   Patient presents with    Routine Prenatal Visit     Anatomy scan today, no complaints.      History:   Marlin is a  currently at 17w3d who presents for a prenatal care visit today.    Reports some abdominal tightening and stretching. Reports some FM. Denies VB, LOF. Reports N/V has improved.     Objective   /70   Wt 79.8 kg (176 lb)   LMP 2023   BMI 31.18 kg/m²   Physical Exam:  Normal, gestational age-appropriate exam today      Assessment & Plan     IUP @ 17w3d  Routine care: I have reviewed the prenatal labs and ultrasound(s) today. I have reviewed the most recent prenatal progress note(s). Anatomy US today - unable to see facial profile, four chamber view and cardiac outflow tracts. All other anatomy cleared as normal. EFW 76%, AC 74%, fundal placenta, vertex.  Severe persistent asthma: follows with Dr. Tobias. Continue Breo Ellipta, singulair, PRN albuterol. Reports she has not needed albuterol recently.  Seizure d/o: follows with [facility]. Last seizure in 2024. Current regimen - lamictal 300mg QD.  AMA: declined genetic screening  Chronic benzodiazepine use: has successfully weaned off klonopin and restoril. Previously taking for fibromyalgia and sleep.     Diagnosis Plan   1. Prenatal care in second trimester        2. Encounter for fetal anatomic survey  US Ob 14 + Weeks Single or First Gestation      3. Asthma affecting pregnancy in second trimester        4. Seizure disorder during pregnancy in second trimester        5. Multigravida of advanced maternal age in second trimester           Medication Management: continue PNV, asthma medications, lamictal    Topics discussed: Prenatal care milestones  Kick counts and fetal movement   labor signs and symptoms   Tests next visit: U/S to complete the anatomic screening   Next visit: 4 week(s)     Judith Amos MD  Obstetrics and Gynecology  [facility]

## 2024-05-28 ENCOUNTER — TELEPHONE (OUTPATIENT)
Dept: PULMONOLOGY | Facility: CLINIC | Age: 44
End: 2024-05-28
Payer: COMMERCIAL

## 2024-05-28 NOTE — TELEPHONE ENCOUNTER
Patient states that insurance is no longer covering her Breo inhaler.  Patient states she is pregnant and wants to know what other type of inhaler she can take.

## 2024-06-10 ENCOUNTER — TELEPHONE (OUTPATIENT)
Dept: OBSTETRICS AND GYNECOLOGY | Facility: CLINIC | Age: 44
End: 2024-06-10
Payer: COMMERCIAL

## 2024-06-10 NOTE — TELEPHONE ENCOUNTER
Caller: Marlin Silva    Relationship: Self    Best call back number: [phone]    What is the best time to reach you: ANY    Who are you requesting to speak with (clinical staff, provider,  specific staff member): CLINICAL    What was the call regarding: PATIENT IS WANTING TO KNOW IF IT IS OK TO TAKE MUCINEX FOR ALLERGIES. SHE HAS TRIED ZURTEC, BUT NO RELIEF. PATIENT IS ALSO CONCERNED BECAUSE SHE HAS NOT BEEN FEELING THE BABY MOVE LIKE SHE DID PREVIOUSLY. HUB UNABLE TO WARM TRANSFER.    Is it okay if the provider responds through MyChart: PATIENT WOULD LIKE A CALL BACK.

## 2024-06-17 ENCOUNTER — ROUTINE PRENATAL (OUTPATIENT)
Dept: OBSTETRICS AND GYNECOLOGY | Facility: CLINIC | Age: 44
End: 2024-06-17
Payer: COMMERCIAL

## 2024-06-17 VITALS — BODY MASS INDEX: 32.25 KG/M2 | WEIGHT: 182 LBS | DIASTOLIC BLOOD PRESSURE: 72 MMHG | SYSTOLIC BLOOD PRESSURE: 124 MMHG

## 2024-06-17 DIAGNOSIS — O09.522 MULTIGRAVIDA OF ADVANCED MATERNAL AGE IN SECOND TRIMESTER: ICD-10-CM

## 2024-06-17 DIAGNOSIS — Z36.2 ENCOUNTER FOR FOLLOW-UP ULTRASOUND OF FETAL ANATOMY: Primary | ICD-10-CM

## 2024-06-17 PROBLEM — O09.529 AMA (ADVANCED MATERNAL AGE) MULTIGRAVIDA 35+: Status: ACTIVE | Noted: 2024-06-17

## 2024-06-17 PROCEDURE — 0502F SUBSEQUENT PRENATAL CARE: CPT | Performed by: MIDWIFE

## 2024-06-17 RX ORDER — PNV NO.95/FERROUS FUM/FOLIC AC 28MG-0.8MG
1 TABLET ORAL DAILY
COMMUNITY

## 2024-06-17 NOTE — PROGRESS NOTES
Chief Complaint   Patient presents with    Routine Prenatal Visit     Follow up anatomy scan, c/o kamari quinn       HPI: Marlin is a  currently at 21w3d here for prenatal visit who reports the following: Baby is active. She has had some [place] Quinn ctx. Her Lamictal was increased to 200 mg BID.                EXAM:     Vitals:    24 0909   BP: 124/72      Abdomen:   See prenatal flowsheet as noted and reviewed, soft, nontender   Pelvic:  See prenatal flowsheet as noted and reviewed   Urine:  See prenatal flowsheet as noted and reviewed    Lab Results   Component Value Date    ABO O 2024    RH Positive 2024    ABSCRN Negative 2024       MDM:  Impression: AMA - declines testing  Asthma   Tests done today: U/S to complete the anatomic screening - anatomy completely seen today   Topics discussed: kick counts and fetal movement  Increase water intake  Reviewed OB labs   Tests next visit: GCT  HgB                RTO:                        4 weeks    This note was electronically signed.  Velia Echeverria, APRN  2024

## 2024-06-23 DIAGNOSIS — J45.50 SEVERE PERSISTENT ASTHMA WITHOUT COMPLICATION: ICD-10-CM

## 2024-06-23 DIAGNOSIS — J30.89 OTHER ALLERGIC RHINITIS: ICD-10-CM

## 2024-06-24 RX ORDER — MONTELUKAST SODIUM 10 MG/1
10 TABLET ORAL NIGHTLY
Qty: 30 TABLET | Refills: 5 | Status: SHIPPED | OUTPATIENT
Start: 2024-06-24

## 2024-07-08 ENCOUNTER — TELEPHONE (OUTPATIENT)
Dept: OBSTETRICS AND GYNECOLOGY | Facility: CLINIC | Age: 44
End: 2024-07-08

## 2024-07-08 NOTE — TELEPHONE ENCOUNTER
Caller: Marlin Silva Kimberly    Relationship: Self    Best call back number: [phone]    What form or medical record are you requesting: RELEASE FORM STATING SHE CAN FLY    Who is requesting this form or medical record from you: [employer] NEEDS    How would you like to receive the form or medical records (pick-up, mail, fax):     Timeframe paperwork needed: ASAP    Additional notes: PT IS FLYING NEXT WEEK AND NEEDS A PAPER FOR THE AIRLINE STATING THAT SHE CAN FLY. SHE WOULD LIKE TO PICK IT UP TODAY IF POSSIBLE. PLEASE CALL HER AND LET HER KNOW WHEN SHE CAN PICK IT UP. YOU CAN CALL AT ANYTIME AND YOU CAN LVM IF NEEDED. SHE HAS AN APPT TODAY SO IF SHE DOES NOT ANSWER YOU CAN ALSO CALL HER  [phone]

## 2024-07-22 ENCOUNTER — ROUTINE PRENATAL (OUTPATIENT)
Dept: OBSTETRICS AND GYNECOLOGY | Facility: CLINIC | Age: 44
End: 2024-07-22
Payer: COMMERCIAL

## 2024-07-22 VITALS — BODY MASS INDEX: 33.67 KG/M2 | DIASTOLIC BLOOD PRESSURE: 72 MMHG | WEIGHT: 190 LBS | SYSTOLIC BLOOD PRESSURE: 122 MMHG

## 2024-07-22 DIAGNOSIS — O09.92 ENCOUNTER FOR SUPERVISION OF HIGH RISK PREGNANCY IN SECOND TRIMESTER, ANTEPARTUM: Primary | ICD-10-CM

## 2024-07-22 DIAGNOSIS — Z34.92 PRENATAL CARE IN SECOND TRIMESTER: ICD-10-CM

## 2024-07-22 DIAGNOSIS — O99.512 ASTHMA AFFECTING PREGNANCY IN SECOND TRIMESTER: ICD-10-CM

## 2024-07-22 DIAGNOSIS — O09.522 MULTIGRAVIDA OF ADVANCED MATERNAL AGE IN SECOND TRIMESTER: ICD-10-CM

## 2024-07-22 DIAGNOSIS — R60.0 BILATERAL LEG EDEMA: ICD-10-CM

## 2024-07-22 DIAGNOSIS — J45.909 ASTHMA AFFECTING PREGNANCY IN SECOND TRIMESTER: ICD-10-CM

## 2024-07-22 DIAGNOSIS — O99.352 SEIZURE DISORDER DURING PREGNANCY IN SECOND TRIMESTER: ICD-10-CM

## 2024-07-22 DIAGNOSIS — G40.909 SEIZURE DISORDER DURING PREGNANCY IN SECOND TRIMESTER: ICD-10-CM

## 2024-07-22 LAB
BASOPHILS # BLD AUTO: 0.03 10*3/MM3 (ref 0–0.2)
BASOPHILS NFR BLD AUTO: 0.4 % (ref 0–1.5)
EOSINOPHIL # BLD AUTO: 0.3 10*3/MM3 (ref 0–0.4)
EOSINOPHIL NFR BLD AUTO: 3.6 % (ref 0.3–6.2)
ERYTHROCYTE [DISTWIDTH] IN BLOOD BY AUTOMATED COUNT: 14.2 % (ref 12.3–15.4)
GLUCOSE 1H P 50 G GLC PO SERPL-MCNC: 180 MG/DL (ref 65–139)
HCT VFR BLD AUTO: 31.8 % (ref 34–46.6)
HGB BLD-MCNC: 10.4 G/DL (ref 12–15.9)
IMM GRANULOCYTES # BLD AUTO: 0.09 10*3/MM3 (ref 0–0.05)
IMM GRANULOCYTES NFR BLD AUTO: 1.1 % (ref 0–0.5)
LYMPHOCYTES # BLD AUTO: 1.17 10*3/MM3 (ref 0.7–3.1)
LYMPHOCYTES NFR BLD AUTO: 14.1 % (ref 19.6–45.3)
MCH RBC QN AUTO: 28.1 PG (ref 26.6–33)
MCHC RBC AUTO-ENTMCNC: 32.7 G/DL (ref 31.5–35.7)
MCV RBC AUTO: 85.9 FL (ref 79–97)
MONOCYTES # BLD AUTO: 0.45 10*3/MM3 (ref 0.1–0.9)
MONOCYTES NFR BLD AUTO: 5.4 % (ref 5–12)
NEUTROPHILS # BLD AUTO: 6.28 10*3/MM3 (ref 1.7–7)
NEUTROPHILS NFR BLD AUTO: 75.4 % (ref 42.7–76)
NRBC BLD AUTO-RTO: 0 /100 WBC (ref 0–0.2)
PLATELET # BLD AUTO: 245 10*3/MM3 (ref 140–450)
RBC # BLD AUTO: 3.7 10*6/MM3 (ref 3.77–5.28)
WBC # BLD AUTO: 8.32 10*3/MM3 (ref 3.4–10.8)

## 2024-07-22 PROCEDURE — 0502F SUBSEQUENT PRENATAL CARE: CPT | Performed by: OBSTETRICS & GYNECOLOGY

## 2024-07-22 NOTE — PROGRESS NOTES
Chief Complaint  Routine Prenatal Visit (Glucola done today, patient complains of swelling. )    History of Present Illness:  Marlin is a  currently at 26w3d who presents today with complaints of lower extremity edema.  Patient has recently been on vacation.  She has had swelling of her lower extremities.  She denies any headaches or visual disturbances.  She does report positive fetal movement.  She denies any significant cramping or contractions.  Glucola is obtained today as well.    Exam:  Vitals:  See prenatal flowsheet as noted and reviewed  General: Alert, cooperative, and does not appear in any distress  Abdomen:   See prenatal flowsheet as noted and reviewed    Uterus gravid, non-tender; no palpable masses    No guarding or rebound tenderness  Pelvic:  See prenatal flowsheet as noted and reviewed  Ext:  See prenatal flowsheet as noted and reviewed    Moves extremities well, no cyanosis and no redness  Urine:  See prenatal flowsheet as noted and reviewed    Data Review:  The following data was reviewed by: Hanh Mccarty MD on 2024:  Prenatal Labs:  Lab Results   Component Value Date    HGB 12.1 2024    RUBELLAABIGG 4.63 2024    HEPBSAG Negative 2024    ABO O 2024    RH Positive 2024    ABSCRN Negative 2024    GBD9DAT6 Non Reactive 2024    HEPCVIRUSABY Non Reactive 2024    URINECX No growth 2017       No visits with results within 1 Month(s) from this visit.   Latest known visit with results is:   Initial Prenatal on 2024   Component Date Value    Atopobium Vaginae 2024 Low - 0     BVAB 2 2024 Low - 0     Megasphaera 1 2024 Low - 0     Varsha Albicans, KETTY 2024 Negative     Varsha Glabrata, KETTY 2024 Negative     Trichomonas vaginosis 2024 Negative     Chlamydia trachomatis, N* 2024 Negative     Neisseria gonorrhoeae, N* 2024 Negative     Amphetamine, Urine Qual 2024 Negative      Barbiturates Screen, Uri* 04/24/2024 Negative     Benzodiazepine Screen, U* 04/24/2024 Negative     THC Screen, Urine 04/24/2024 Negative     Cocaine Screen, Urine 04/24/2024 Negative     Opiate Screen, Urine 04/24/2024 Negative     Oxycodone/Oxymorphone, U* 04/24/2024 Negative     Phencyclidine (PCP), Uri* 04/24/2024 Negative     Methadone Screen, Urine 04/24/2024 Negative     Propoxyphene Screen 04/24/2024 Negative     Creatinine, Urine 04/24/2024 16.9 (L)     pH, UA 04/24/2024 5.9     Please note 04/24/2024 Comment     HCG, Urine, QL 04/24/2024 Positive (A)     Lot Number 04/24/2024 [id number]     Internal Positive Control 04/24/2024 Passed     Internal Negative Control 04/24/2024 Passed     Expiration Date 04/24/2024 04/08/2025     Hepatitis B Surface Ag 04/24/2024 Negative     Hep C Virus Ab 04/24/2024 Non Reactive     RPR 04/24/2024 Non Reactive     Rubella Antibodies, IgG 04/24/2024 4.63     ABO Type 04/24/2024 O     Rh Factor 04/24/2024 Positive     Antibody Screen 04/24/2024 Negative     HIV Screen 4th Gen w/RFX* 04/24/2024 Non Reactive     WBC 04/24/2024 9.4     RBC 04/24/2024 4.39     Hemoglobin 04/24/2024 12.1     Hematocrit 04/24/2024 35.9     MCV 04/24/2024 82     MCH 04/24/2024 27.6     MCHC 04/24/2024 33.7     RDW 04/24/2024 17.4 (H)     Platelets 04/24/2024 324     Neutrophil Rel % 04/24/2024 80     Lymphocyte Rel % 04/24/2024 14     Monocyte Rel % 04/24/2024 5     Eosinophil Rel % 04/24/2024 1     Basophil Rel % 04/24/2024 0     Neutrophils Absolute 04/24/2024 7.3 (H)     Lymphocytes Absolute 04/24/2024 1.3     Monocytes Absolute 04/24/2024 0.5     Eosinophils Absolute 04/24/2024 0.1     Basophils Absolute 04/24/2024 0.0     Immature Granulocyte Rel* 04/24/2024 0     Immature Grans Absolute 04/24/2024 0.0     Hemoglobin A1C 04/24/2024 5.0     Specific Gravity, UA 04/24/2024 1.0047      Imaging:  US Ob Follow Up Transabdominal Approach  Active fetus.  Normal four-chamber heart and outflow tracts.   Normal   profile.     Medical Records:  None    Assessment and Plan:  Problem List Items Addressed This Visit          Gravid and     AMA (advanced maternal age) multigravida 35+    Relevant Orders    US Ob Follow Up Transabdominal Approach     Other Visit Diagnoses       Encounter for supervision of high risk pregnancy in second trimester, antepartum    -  Primary  Topics discussed:     GERD management  iron supplementation  kick counts and fetal movement  PIH precautions   labor signs and symptoms      Relevant Orders    Gestational Screen 1 Hr ([facility])    CBC & Differential    US Ob Follow Up Transabdominal Approach    Prenatal care in second trimester      Labs as noted    Relevant Orders    Gestational Screen 1 Hr ([facility])    CBC & Differential    Seizure disorder during pregnancy in second trimester      Patient to continue her current medication    Relevant Orders    US Ob Follow Up Transabdominal Approach    Asthma affecting pregnancy in second trimester      To continue her inhalers as noted.    Relevant Orders    US Ob Follow Up Transabdominal Approach    Bilateral leg edema      Recommend elevation of her feet.  Modified bedrest as well.  Recommend low-sodium diet and increase in p.o. fluids.  Patient is to call if worsening and/or changes in her symptoms.    Relevant Orders    US Ob Follow Up Transabdominal Approach          Follow Up/Instructions:  Follow up as scheduled.  Patient was given instructions and counseling regarding her condition or for health maintenance advice. Please see specific information pulled into the AVS if appropriate.     Note: Speech recognition transcription software may have been used to dictate portions of this document.  An attempt at proofreading has been made though minor errors in transcription may still be present.    This note was electronically signed.  Hanh Mccarty M.D.

## 2024-07-23 DIAGNOSIS — R30.0 DYSURIA: Primary | ICD-10-CM

## 2024-07-26 LAB
BACTERIA UR CULT: NORMAL
BACTERIA UR CULT: NORMAL
GLUCOSE 1H P 100 G GLC PO SERPL-MCNC: 213 MG/DL (ref 65–179)
GLUCOSE 2H P 100 G GLC PO SERPL-MCNC: 186 MG/DL (ref 65–154)
GLUCOSE 3H P 100 G GLC PO SERPL-MCNC: 201 MG/DL (ref 65–139)
GLUCOSE P FAST SERPL-MCNC: 89 MG/DL (ref 65–94)

## 2024-07-29 NOTE — PROGRESS NOTES
Patient has gestational diabetes.  Please send in glucometer test strips and lancets, number to 120 with 6 refills each.  Get in with diabetic education.  Have patient check blood sugars fasting and 1 hour after breakfast/lunch/dinner.  She needs to record these in a note book log to bring in for review in 1 week.

## 2024-07-30 DIAGNOSIS — O24.410 DIET CONTROLLED GESTATIONAL DIABETES MELLITUS (GDM) IN SECOND TRIMESTER: Primary | ICD-10-CM

## 2024-07-30 RX ORDER — BLOOD-GLUCOSE METER
1 KIT MISCELLANEOUS DAILY
Qty: 1 EACH | Refills: 0 | Status: SHIPPED | OUTPATIENT
Start: 2024-07-30

## 2024-07-30 RX ORDER — SYRING-NEEDL,DISP,INSUL,0.3 ML 30 GX5/16"
1 SYRINGE, EMPTY DISPOSABLE MISCELLANEOUS 4 TIMES DAILY
Qty: 120 EACH | Refills: 2 | Status: SHIPPED | OUTPATIENT
Start: 2024-07-30

## 2024-08-05 ENCOUNTER — ROUTINE PRENATAL (OUTPATIENT)
Dept: OBSTETRICS AND GYNECOLOGY | Facility: CLINIC | Age: 44
End: 2024-08-05
Payer: COMMERCIAL

## 2024-08-05 VITALS — BODY MASS INDEX: 33.49 KG/M2 | WEIGHT: 189 LBS | SYSTOLIC BLOOD PRESSURE: 118 MMHG | DIASTOLIC BLOOD PRESSURE: 72 MMHG

## 2024-08-05 DIAGNOSIS — G40.909 SEIZURE DISORDER: ICD-10-CM

## 2024-08-05 DIAGNOSIS — O36.63X0 EXCESSIVE FETAL GROWTH AFFECTING MANAGEMENT OF PREGNANCY IN THIRD TRIMESTER, SINGLE OR UNSPECIFIED FETUS: ICD-10-CM

## 2024-08-05 DIAGNOSIS — Z34.93 PRENATAL CARE IN THIRD TRIMESTER: Primary | ICD-10-CM

## 2024-08-05 DIAGNOSIS — J45.40 MODERATE PERSISTENT ASTHMA WITHOUT COMPLICATION: Chronic | ICD-10-CM

## 2024-08-05 DIAGNOSIS — O09.523 MULTIGRAVIDA OF ADVANCED MATERNAL AGE IN THIRD TRIMESTER: ICD-10-CM

## 2024-08-05 RX ORDER — FOLIC ACID 1 MG/1
1 TABLET ORAL DAILY
Qty: 30 TABLET | Refills: 12 | Status: SHIPPED | OUTPATIENT
Start: 2024-08-05

## 2024-08-06 PROBLEM — O36.63X0 EXCESSIVE FETAL GROWTH AFFECTING MANAGEMENT OF MOTHER IN THIRD TRIMESTER, ANTEPARTUM: Status: ACTIVE | Noted: 2024-08-06

## 2024-08-06 NOTE — PROGRESS NOTES
Prenatal Care Visit    Subjective   Chief Complaint   Patient presents with    Routine Prenatal Visit     Growth scan, no complaints.       History:   Marlin is a  currently at 28w3d who presents for a prenatal care visit today.    FSBG values for the past 3-4 days demonstrate elevated fasting values and occasional elevated postprandial values.    Social History    Tobacco Use      Smoking status: Never        Passive exposure: Never      Smokeless tobacco: Never       Objective   /72   Wt 85.7 kg (189 lb)   LMP 2023   BMI 33.49 kg/m²   Physical Exam:  Normal, gestational age-appropriate exam today        Plan   Medical Decision Making:    I have reviewed the prenatal labs and ultrasound(s) today. I have reviewed the most recent prenatal progress note(s).    Diagnosis: Supervision of high risk pregnancy  DM - GDMA1  LGA  AMA, no screening  Asthma  Seizure Dz   Tests/Orders/Rx today: No orders of the defined types were placed in this encounter.      Medication Management: None     Topics discussed: Prenatal care milestones  glucose management  kick counts and fetal movement  PIH precautions   labor signs and symptoms  Diet reviewed.  Continue FSBG log for an additional week.  Ultrasound findings discussed  Asthma is well-controlled on current medications.  No seizures since March.  Therapeutic levels for Lamictal.  Add folic acid.   Tests next visit: none   Next visit: 1 week(s)     Skip Black MD  Obstetrics and Gynecology  [facility]

## 2024-08-12 ENCOUNTER — ROUTINE PRENATAL (OUTPATIENT)
Dept: OBSTETRICS AND GYNECOLOGY | Facility: CLINIC | Age: 44
End: 2024-08-12
Payer: COMMERCIAL

## 2024-08-12 VITALS — BODY MASS INDEX: 33.13 KG/M2 | SYSTOLIC BLOOD PRESSURE: 122 MMHG | WEIGHT: 187 LBS | DIASTOLIC BLOOD PRESSURE: 74 MMHG

## 2024-08-12 DIAGNOSIS — O36.63X0 EXCESSIVE FETAL GROWTH AFFECTING MANAGEMENT OF PREGNANCY IN THIRD TRIMESTER, SINGLE OR UNSPECIFIED FETUS: ICD-10-CM

## 2024-08-12 DIAGNOSIS — O24.419 GESTATIONAL DIABETES MELLITUS, CLASS A2: ICD-10-CM

## 2024-08-12 DIAGNOSIS — Z34.93 PRENATAL CARE IN THIRD TRIMESTER: Primary | ICD-10-CM

## 2024-08-12 DIAGNOSIS — O09.523 MULTIGRAVIDA OF ADVANCED MATERNAL AGE IN THIRD TRIMESTER: ICD-10-CM

## 2024-08-12 PROCEDURE — 0502F SUBSEQUENT PRENATAL CARE: CPT | Performed by: OBSTETRICS & GYNECOLOGY

## 2024-08-13 PROBLEM — O24.419 GESTATIONAL DIABETES MELLITUS, CLASS A2: Status: ACTIVE | Noted: 2024-08-13

## 2024-08-13 NOTE — PROGRESS NOTES
Prenatal Care Visit    Subjective   Chief Complaint   Patient presents with    Routine Prenatal Visit       History:   Marlin is a  currently at 29w3d who presents for a prenatal care visit today.    FSBG values for the past 3-4 days demonstrate elevated fasting values and occasional elevated postprandial values.    Social History    Tobacco Use      Smoking status: Never        Passive exposure: Never      Smokeless tobacco: Never       Objective   /74   Wt 84.8 kg (187 lb)   LMP 2023   BMI 33.13 kg/m²   Physical Exam:  Normal, gestational age-appropriate exam today        Plan   Medical Decision Making:    I have reviewed the prenatal labs and ultrasound(s) today. I have reviewed the most recent prenatal progress note(s).    Diagnosis: Supervision of high risk pregnancy  DM - GDMA2  LGA  AMA, no screening  Asthma  Seizure Dz   Tests/Orders/Rx today: No orders of the defined types were placed in this encounter.      Medication Management: Add metformin 1,000 mg nightly     Topics discussed: Prenatal care milestones  glucose management  kick counts and fetal movement  PIH precautions   labor signs and symptoms  Diet + FSBG log reviewed  Add medication as above  Asthma is well-controlled on current medications.  No seizures since March.  Therapeutic levels for Lamictal.  Add folic acid.   Tests next visit: none   Next visit: 1 week(s)     Skip Black MD  Obstetrics and Gynecology  [facility]

## 2024-08-19 ENCOUNTER — TELEPHONE (OUTPATIENT)
Dept: OBSTETRICS AND GYNECOLOGY | Facility: CLINIC | Age: 44
End: 2024-08-19
Payer: COMMERCIAL

## 2024-08-19 ENCOUNTER — ROUTINE PRENATAL (OUTPATIENT)
Dept: OBSTETRICS AND GYNECOLOGY | Facility: CLINIC | Age: 44
End: 2024-08-19
Payer: COMMERCIAL

## 2024-08-19 VITALS — BODY MASS INDEX: 33.24 KG/M2 | DIASTOLIC BLOOD PRESSURE: 68 MMHG | SYSTOLIC BLOOD PRESSURE: 120 MMHG | WEIGHT: 187.6 LBS

## 2024-08-19 DIAGNOSIS — B08.3 FIFTH DISEASE: Primary | ICD-10-CM

## 2024-08-19 PROCEDURE — 0502F SUBSEQUENT PRENATAL CARE: CPT | Performed by: OBSTETRICS & GYNECOLOGY

## 2024-08-19 NOTE — TELEPHONE ENCOUNTER
Exposure shouldn't cause an issue. She can discuss this further when she comes for appointment today.

## 2024-08-19 NOTE — PROGRESS NOTES
Chief Complaint   Patient presents with    Routine Prenatal Visit     Prenatal visit with no problems or concerns        HPI:   , 30w3d gestation reports doing well    ROS:  See Prenatal Episode/Flowsheet  /68   Wt 85.1 kg (187 lb 9.6 oz)   LMP 2023   BMI 33.24 kg/m²      EXAM:  EXTREMITIES:  No swelling-See Prenatal Episode/Flowsheet    ABDOMEN:  FHTs/Movement noted-See Prenatal Episode/Flowsheet    URINE GLUCOSE/PROTEIN:  See Prenatal Episode/Flowsheet    PELVIC EXAM:  See Prenatal Episode/Flowsheet  CV:  Lungs:  GYN:    MDM:    Lab Results   Component Value Date    HGB 10.4 (L) 2024    RUBELLAABIGG 4.63 2024    HEPBSAG Negative 2024    ABO O 2024    RH Positive 2024    ABSCRN Negative 2024    MYU2QPF4 Non Reactive 2024    HEPCVIRUSABY Non Reactive 2024    [id number] 213 (H) 2024    URINECX Final report 2024       U/S:US Ob Follow Up Transabdominal Approach (2024 10:03)     1. IUP 30w3d  2. Routine care   3. Anemia: taking Fe and PNV  4. A2: 1 hour postprandials are normal.  Fastings 40% abnormal just barely   Call with sugars Thursday.  May need to add additional dosing  5.  Parvo titers today due to exposure that was roughly 2 weeks ago  6.  Repeat growth 1 week

## 2024-08-19 NOTE — TELEPHONE ENCOUNTER
Patient called this morning stating she had been exposed to [employer] Disease and wanted to know if it would effect her pregnancy. Patient is 30w3d.

## 2024-08-21 LAB
B19V IGG SER IA-ACNC: 2.4 INDEX (ref 0–0.8)
B19V IGM SER IA-ACNC: 0.2 INDEX (ref 0–0.8)

## 2024-08-22 ENCOUNTER — TELEPHONE (OUTPATIENT)
Dept: OBSTETRICS AND GYNECOLOGY | Facility: CLINIC | Age: 44
End: 2024-08-22
Payer: COMMERCIAL

## 2024-08-22 NOTE — TELEPHONE ENCOUNTER
Provider: DR. GRIGSBY    Caller: BHAVNA GANDHI    Relationship to Patient: SELF    Pharmacy:     Phone Number: [phone]    Reason for Call: PT ADV DR. GRIGSBY TOLD HER TO CALL AND ADAVISE HIM WHAT HER FASTING GLUCOSE WAS.   FASTING #S RANGING FROM [phone]  OTHER NUMBERS ARE WITHIN NORMAL RANGE

## 2024-08-26 ENCOUNTER — ROUTINE PRENATAL (OUTPATIENT)
Dept: OBSTETRICS AND GYNECOLOGY | Facility: CLINIC | Age: 44
End: 2024-08-26
Payer: COMMERCIAL

## 2024-08-26 ENCOUNTER — LAB (OUTPATIENT)
Dept: LAB | Facility: HOSPITAL | Age: 44
End: 2024-08-26
Payer: COMMERCIAL

## 2024-08-26 VITALS — DIASTOLIC BLOOD PRESSURE: 70 MMHG | BODY MASS INDEX: 33.31 KG/M2 | WEIGHT: 188 LBS | SYSTOLIC BLOOD PRESSURE: 120 MMHG

## 2024-08-26 DIAGNOSIS — G40.909 SEIZURE DISORDER: ICD-10-CM

## 2024-08-26 DIAGNOSIS — O09.93 ENCOUNTER FOR SUPERVISION OF HIGH RISK PREGNANCY IN THIRD TRIMESTER, ANTEPARTUM: Primary | ICD-10-CM

## 2024-08-26 DIAGNOSIS — J02.9 ACUTE PHARYNGITIS, UNSPECIFIED ETIOLOGY: ICD-10-CM

## 2024-08-26 DIAGNOSIS — O09.523 MULTIGRAVIDA OF ADVANCED MATERNAL AGE IN THIRD TRIMESTER: ICD-10-CM

## 2024-08-26 DIAGNOSIS — R09.81 CONGESTION OF NASAL SINUS: ICD-10-CM

## 2024-08-26 DIAGNOSIS — O24.419 GESTATIONAL DIABETES MELLITUS, CLASS A2: ICD-10-CM

## 2024-08-26 DIAGNOSIS — R05.1 ACUTE COUGH: ICD-10-CM

## 2024-08-26 DIAGNOSIS — O36.63X0 EXCESSIVE FETAL GROWTH AFFECTING MANAGEMENT OF PREGNANCY IN THIRD TRIMESTER, SINGLE OR UNSPECIFIED FETUS: ICD-10-CM

## 2024-08-26 DIAGNOSIS — J45.40 MODERATE PERSISTENT ASTHMA WITHOUT COMPLICATION: Chronic | ICD-10-CM

## 2024-08-26 LAB
B PARAPERT DNA SPEC QL NAA+PROBE: NOT DETECTED
B PERT DNA SPEC QL NAA+PROBE: NOT DETECTED
C PNEUM DNA NPH QL NAA+NON-PROBE: NOT DETECTED
FLUAV SUBTYP SPEC NAA+PROBE: NOT DETECTED
FLUBV RNA ISLT QL NAA+PROBE: NOT DETECTED
HADV DNA SPEC NAA+PROBE: NOT DETECTED
HCOV 229E RNA SPEC QL NAA+PROBE: NOT DETECTED
HCOV HKU1 RNA SPEC QL NAA+PROBE: NOT DETECTED
HCOV NL63 RNA SPEC QL NAA+PROBE: NOT DETECTED
HCOV OC43 RNA SPEC QL NAA+PROBE: NOT DETECTED
HMPV RNA NPH QL NAA+NON-PROBE: NOT DETECTED
HPIV1 RNA ISLT QL NAA+PROBE: NOT DETECTED
HPIV2 RNA SPEC QL NAA+PROBE: NOT DETECTED
HPIV3 RNA NPH QL NAA+PROBE: NOT DETECTED
HPIV4 P GENE NPH QL NAA+PROBE: NOT DETECTED
M PNEUMO IGG SER IA-ACNC: NOT DETECTED
RHINOVIRUS RNA SPEC NAA+PROBE: NOT DETECTED
RSV RNA NPH QL NAA+NON-PROBE: NOT DETECTED
S PYO AG THROAT QL: NEGATIVE
SARS-COV-2 RNA NPH QL NAA+NON-PROBE: NOT DETECTED

## 2024-08-26 PROCEDURE — 87880 STREP A ASSAY W/OPTIC: CPT

## 2024-08-26 PROCEDURE — 87081 CULTURE SCREEN ONLY: CPT

## 2024-08-26 PROCEDURE — 0202U NFCT DS 22 TRGT SARS-COV-2: CPT

## 2024-08-26 RX ORDER — AZITHROMYCIN 250 MG/1
TABLET, FILM COATED ORAL
Qty: 6 TABLET | Refills: 0 | Status: SHIPPED | OUTPATIENT
Start: 2024-08-26

## 2024-08-27 NOTE — PROGRESS NOTES
Chief Complaint  Routine Prenatal Visit (Growth scan today, patient complains of respiratory infection. )    History of Present Illness:  Marlin is a  currently at 31w4d who presents today with complaints of a cough, nasal congestion, and sore throat.  Patient denies any fever or chills.  She has not checked her temperature however.  She reports initially having nasal congestion.  She has been having postnasal drainage.  She reports having a dry cough.  She has had worsening of her asthma.  She has been using her inhalers.  She reports no one else in the household has been ill.  Her glucose levels have remained essentially the same.  Her fasting levels have been 88-96.  Her post breakfast values have been 89-1 maximum of 196.  Her lunch has been predominantly within the normal range with a maximum of 144.  Her dinner glucose levels have been normal.  She does report good fetal movement.  She denies any significant cramping or contractions.  She has been taking her prenatal vitamins and iron supplements.    Exam:  Vitals:  See prenatal flowsheet as noted and reviewed  General: Alert, cooperative, and does not appear in any distress  Abdomen:   See prenatal flowsheet as noted and reviewed    Uterus gravid, non-tender; no palpable masses    No guarding or rebound tenderness  Pelvic:  See prenatal flowsheet as noted and reviewed  Ext:  See prenatal flowsheet as noted and reviewed    Moves extremities well, no cyanosis and no redness  Urine:  See prenatal flowsheet as noted and reviewed    Data Review:  The following data was reviewed by: Hanh Mccarty MD on 2024:  Prenatal Labs:  Lab Results   Component Value Date    HGB 10.4 (L) 2024    RUBELLAABIGG 4.63 2024    HEPBSAG Negative 2024    ABO O 2024    RH Positive 2024    ABSCRN Negative 2024    UEW5BXL4 Non Reactive 2024    HEPCVIRUSABY Non Reactive 2024    [id number] 213 (H) 2024    URINECX Final report  2024       Lab on 2024   Component Date Value    Strep A Ag 2024 Negative     ADENOVIRUS, PCR 2024 Not Detected     Coronavirus 229E 2024 Not Detected     Coronavirus HKU1 2024 Not Detected     Coronavirus  Not Detected     Coronavirus  Not Detected     COVID19 2024 Not Detected     Human Metapneumovirus 2024 Not Detected     Human Rhinovirus/Enterov* 2024 Not Detected     Influenza A PCR 2024 Not Detected     Influenza B PCR 2024 Not Detected     Parainfluenza Virus 1 2024 Not Detected     Parainfluenza Virus 2 2024 Not Detected     Parainfluenza Virus 3 2024 Not Detected     Parainfluenza Virus 4 2024 Not Detected     RSV, PCR 2024 Not Detected     Bordetella pertussis pcr 2024 Not Detected     Bordetella parapertussis* 2024 Not Detected     Chlamydophila pneumoniae* 2024 Not Detected     Mycoplasma pneumo by PCR 2024 Not Detected    Routine Prenatal on 2024   Component Date Value    Parvovirus B19 IgG 2024 2.4 (H)     Parvovirus B19 IgM 2024 0.2      Imaging:  US Ob Follow Up Transabdominal Approach  Marlin Silva  : 1980  MRN: [id number]  Date: 2024    Reason for exam/History:  Growth    Ultrasound images are reviewed.  There is noted to be a viable   intrauterine pregnancy. The pregnancy is measuring 33 weeks 2 days   gestation.  The estimated fetal weight is 2053 grams at the 81.1 % for   growth.  The HC was at the 79.1 % and the AC was at the 77.8 %.  The   amniotic fluid index was 18.28 cms.  The fetal heart rate was normal.     The infant was in the vertex presentation.  The placental location was   noted to be anterior.      The exam limitations noted:  none    See ultrasound report for measurements and structures identified.    Hanh Mccarty MD, [facility]  OB GYN [facility]  Records:  None    Assessment and Plan:  Problem List Items Addressed This Visit          Endocrine and Metabolic    Gestational diabetes mellitus, class A2  Scan today for growth is noted.  Patient to continue her current medication as previously given.   testing twice weekly.    Relevant Orders    US Fetal Biophysical Profile;Without Non-Stress Testing       Gravid and     AMA (advanced maternal age) multigravida 35+    Excessive fetal growth affecting management of mother in third trimester, antepartum  Scan today for growth is noted.  Patient informed regarding this findings.       Pulmonary and Pneumonias    Moderate persistent asthma without complication (Chronic)  To continue the use of her inhalers.  She is to go to the emergency room if worsening and/or changes in her symptoms.     Other Visit Diagnoses       Encounter for supervision of high risk pregnancy in third trimester, antepartum    -  Primary  Topics discussed:     GERD management  glucose management  iron supplementation  kick counts and fetal movement  PIH precautions   labor signs and symptoms  Scan today for growth.  Patient informed regarding those findings.    Congestion of nasal sinus      Patient instructed to continue the use of her Mucinex.  Prescription is given for Zithromax as well.  Respiratory panel as noted.    Relevant Medications    azithromycin (Zithromax Z-Allen) 250 MG tablet    Other Relevant Orders    Respiratory Panel PCR w/COVID-19(SARS-CoV-2) [facility]/ALAN/LILLY/PAD/COR/NEW In-House, NP Swab in UTM/VTM, 2 HR TAT - Swab, Nasopharynx (Completed)    Beta Strep Culture, Throat - Swab, Throat    Rapid Strep A Screen - Swab, Throat (Completed)    Acute cough      Patient has been instructed in the use of her Mucinex.  Respiratory panel today.    Relevant Medications    azithromycin (Zithromax Z-Allen) 250 MG tablet    Other Relevant Orders    Respiratory Panel PCR w/COVID-19(SARS-CoV-2) [facility]/ALAN/LILLY/PAD/COR/ENW  In-House, NP Swab in UTM/VTM, 2 HR TAT - Swab, Nasopharynx (Completed)    Beta Strep Culture, Throat - Swab, Throat    Rapid Strep A Screen - Swab, Throat (Completed)    Acute pharyngitis, unspecified etiology      Strep culture as noted.  Patient to call for results.  Prescription is given for Zithromax.    Relevant Medications    azithromycin (Zithromax Z-Allen) 250 MG tablet    Other Relevant Orders    Respiratory Panel PCR w/COVID-19(SARS-CoV-2) [facility]/ALAN/LILLY/PAD/COR/NEW In-House, NP Swab in UTM/VTM, 2 HR TAT - Swab, Nasopharynx (Completed)    Beta Strep Culture, Throat - Swab, Throat    Rapid Strep A Screen - Swab, Throat (Completed)    Seizure disorder      Patient to continue her current medications.          Follow Up/Instructions:  Follow up as scheduled.  Patient was given instructions and counseling regarding her condition or for health maintenance advice. Please see specific information pulled into the AVS if appropriate.     Note: Speech recognition transcription software may have been used to dictate portions of this document.  An attempt at proofreading has been made though minor errors in transcription may still be present.    This note was electronically signed.  Hanh Mccarty M.D.

## 2024-08-28 LAB
BACTERIA SPEC AEROBE CULT: NORMAL
BACTERIA SPEC AEROBE CULT: NORMAL

## 2024-09-03 ENCOUNTER — HOSPITAL ENCOUNTER (OUTPATIENT)
Facility: HOSPITAL | Age: 44
Discharge: HOME | End: 2024-09-03
Attending: MIDWIFE | Admitting: MIDWIFE
Payer: COMMERCIAL

## 2024-09-03 ENCOUNTER — LAB (OUTPATIENT)
Dept: LAB | Facility: HOSPITAL | Age: 44
End: 2024-09-03
Payer: COMMERCIAL

## 2024-09-03 ENCOUNTER — HOSPITAL ENCOUNTER (OUTPATIENT)
Dept: GENERAL RADIOLOGY | Facility: HOSPITAL | Age: 44
Discharge: HOME | End: 2024-09-03
Payer: COMMERCIAL

## 2024-09-03 ENCOUNTER — ROUTINE PRENATAL (OUTPATIENT)
Dept: OBSTETRICS AND GYNECOLOGY | Facility: CLINIC | Age: 44
End: 2024-09-03
Payer: COMMERCIAL

## 2024-09-03 VITALS
HEIGHT: 63 IN | WEIGHT: 186 LBS | DIASTOLIC BLOOD PRESSURE: 57 MMHG | BODY MASS INDEX: 32.96 KG/M2 | SYSTOLIC BLOOD PRESSURE: 123 MMHG | OXYGEN SATURATION: 98 % | TEMPERATURE: 97.5 F | HEART RATE: 74 BPM | RESPIRATION RATE: 16 BRPM

## 2024-09-03 VITALS — BODY MASS INDEX: 32.96 KG/M2 | SYSTOLIC BLOOD PRESSURE: 128 MMHG | DIASTOLIC BLOOD PRESSURE: 68 MMHG | WEIGHT: 186 LBS

## 2024-09-03 DIAGNOSIS — O24.419 GESTATIONAL DIABETES MELLITUS, CLASS A2: ICD-10-CM

## 2024-09-03 DIAGNOSIS — M79.672 LEFT FOOT PAIN: ICD-10-CM

## 2024-09-03 DIAGNOSIS — O36.63X0 EXCESSIVE FETAL GROWTH AFFECTING MANAGEMENT OF PREGNANCY IN THIRD TRIMESTER, SINGLE OR UNSPECIFIED FETUS: ICD-10-CM

## 2024-09-03 DIAGNOSIS — O09.93 ENCOUNTER FOR SUPERVISION OF HIGH RISK PREGNANCY IN THIRD TRIMESTER, ANTEPARTUM: Primary | ICD-10-CM

## 2024-09-03 DIAGNOSIS — L29.9 PRURITUS: ICD-10-CM

## 2024-09-03 DIAGNOSIS — J45.40 MODERATE PERSISTENT ASTHMA WITHOUT COMPLICATION: ICD-10-CM

## 2024-09-03 DIAGNOSIS — O09.523 MULTIGRAVIDA OF ADVANCED MATERNAL AGE IN THIRD TRIMESTER: ICD-10-CM

## 2024-09-03 DIAGNOSIS — G40.909 SEIZURE DISORDER: ICD-10-CM

## 2024-09-03 LAB
BILIRUB BLD-MCNC: NEGATIVE MG/DL
CLARITY, POC: CLEAR
COLOR UR: YELLOW
GLUCOSE UR STRIP-MCNC: NEGATIVE MG/DL
KETONES UR QL: NEGATIVE
LEUKOCYTE EST, POC: NEGATIVE
NITRITE UR-MCNC: NEGATIVE MG/ML
PH UR: 6 [PH] (ref 5–8)
PROT UR STRIP-MCNC: ABNORMAL MG/DL
RBC # UR STRIP: NEGATIVE /UL
SP GR UR: 1.01 (ref 1–1.03)
UROBILINOGEN UR QL: NORMAL

## 2024-09-03 PROCEDURE — 59025 FETAL NON-STRESS TEST: CPT

## 2024-09-03 PROCEDURE — 85027 COMPLETE CBC AUTOMATED: CPT

## 2024-09-03 PROCEDURE — G0463 HOSPITAL OUTPT CLINIC VISIT: HCPCS

## 2024-09-03 PROCEDURE — 82239 BILE ACIDS TOTAL: CPT

## 2024-09-03 PROCEDURE — 73630 X-RAY EXAM OF FOOT: CPT

## 2024-09-03 PROCEDURE — 0502F SUBSEQUENT PRENATAL CARE: CPT | Performed by: OBSTETRICS & GYNECOLOGY

## 2024-09-03 PROCEDURE — 73610 X-RAY EXAM OF ANKLE: CPT

## 2024-09-03 PROCEDURE — 80053 COMPREHEN METABOLIC PANEL: CPT

## 2024-09-03 PROCEDURE — 36415 COLL VENOUS BLD VENIPUNCTURE: CPT

## 2024-09-03 PROCEDURE — 59025 FETAL NON-STRESS TEST: CPT | Performed by: MIDWIFE

## 2024-09-03 PROCEDURE — 81002 URINALYSIS NONAUTO W/O SCOPE: CPT | Performed by: MIDWIFE

## 2024-09-03 RX ORDER — SODIUM CHLORIDE 9 MG/ML
40 INJECTION, SOLUTION INTRAVENOUS AS NEEDED
Status: DISCONTINUED | OUTPATIENT
Start: 2024-09-03 | End: 2024-09-03 | Stop reason: HOSPADM

## 2024-09-03 RX ORDER — LIDOCAINE HYDROCHLORIDE 10 MG/ML
0.5 INJECTION, SOLUTION INFILTRATION; PERINEURAL ONCE AS NEEDED
Status: DISCONTINUED | OUTPATIENT
Start: 2024-09-03 | End: 2024-09-03 | Stop reason: HOSPADM

## 2024-09-03 RX ORDER — SODIUM CHLORIDE 0.9 % (FLUSH) 0.9 %
10 SYRINGE (ML) INJECTION AS NEEDED
Status: DISCONTINUED | OUTPATIENT
Start: 2024-09-03 | End: 2024-09-03 | Stop reason: HOSPADM

## 2024-09-03 RX ORDER — SODIUM CHLORIDE 0.9 % (FLUSH) 0.9 %
10 SYRINGE (ML) INJECTION EVERY 12 HOURS SCHEDULED
Status: DISCONTINUED | OUTPATIENT
Start: 2024-09-03 | End: 2024-09-03 | Stop reason: HOSPADM

## 2024-09-03 NOTE — PROGRESS NOTES
Chief Complaint  Routine Prenatal Visit (Patient complains of pain in left foot, and rash on abdomen. )    History of Present Illness:  Marlin is a  currently at 32w4d who presents today with complaints of left foot pain.  Patient reports the pain has been present for several days.  She has been having swelling as well.  She is not aware of any injury to her foot.  She also has complaints of a rash on her abdomen which has been pruritic.  She denies any rash or itching otherwise.  She reports her fasting glucose levels have been 89 to 94.  For 1 hour post meals have been predominantly in the 90s.  She has been taking her prenatal vitamins and iron supplements.  She does report good fetal movement.  She denies any significant cramping or contractions.  Does report her cough and breathing has improved.    Exam:  Vitals:  See prenatal flowsheet as noted and reviewed  General: Alert, cooperative, and does not appear in any distress  Abdomen:   See prenatal flowsheet as noted and reviewed    Uterus gravid, non-tender; no palpable masses    No guarding or rebound tenderness  Pelvic:  See prenatal flowsheet as noted and reviewed  Ext:  See prenatal flowsheet as noted and reviewed    Moves extremities well, no cyanosis and no redness    Tenderness on the dorsum of the left foot.  Positive edema noted.  Positive tenderness with dorsi and plantarflexion.  Urine:  See prenatal flowsheet as noted and reviewed    Data Review:  The following data was reviewed by: Hanh Mccarty MD on 2024:  Prenatal Labs:  Lab Results   Component Value Date    HGB 10.4 (L) 2024    RUBELLAABIGG 4.63 2024    HEPBSAG Negative 2024    ABO O 2024    RH Positive 2024    ABSCRN Negative 2024    KZC2QAL1 Non Reactive 2024    HEPCVIRUSABY Non Reactive 2024    COB6MLQO 213 (H) 2024    URINECX Final report 2024       Admission on 2024, Discharged on 2024   Component Date  Value    Color 2024 Yellow     Clarity, UA 2024 Clear     Glucose, UA 2024 Negative     Bilirubin 2024 Negative     Ketones, UA 2024 Negative     Specific Gravity  2024 1.010     Blood, UA 2024 Negative     pH, Urine 2024 6.0     Protein, POC 2024 Trace (A)     Urobilinogen, UA 2024 Normal     Leukocytes 2024 Negative     Nitrite, UA 2024 Negative    Lab on 2024   Component Date Value    Strep A Ag 2024 Negative     ADENOVIRUS, PCR 2024 Not Detected     Coronavirus 229E 2024 Not Detected     Coronavirus HKU1 2024 Not Detected     Coronavirus  Not Detected     Coronavirus  Not Detected     COVID19 2024 Not Detected     Human Metapneumovirus 2024 Not Detected     Human Rhinovirus/Enterov* 2024 Not Detected     Influenza A PCR 2024 Not Detected     Influenza B PCR 2024 Not Detected     Parainfluenza Virus 1 2024 Not Detected     Parainfluenza Virus 2 2024 Not Detected     Parainfluenza Virus 3 2024 Not Detected     Parainfluenza Virus 4 2024 Not Detected     RSV, PCR 2024 Not Detected     Bordetella pertussis pcr 2024 Not Detected     Bordetella parapertussis* 2024 Not Detected     Chlamydophila pneumoniae* 2024 Not Detected     Mycoplasma pneumo by PCR 2024 Not Detected     Throat Culture, Beta Str* 2024 No Beta Hemolytic Streptococcus Isolated     Throat Culture, Beta Str* 2024 No Beta Hemolytic Streptococcus Isolated    Routine Prenatal on 2024   Component Date Value    Parvovirus B19 IgG 2024 2.4 (H)     Parvovirus B19 IgM 2024 0.2      Imaging:  US Ob Follow Up Transabdominal Approach  Marlin Woodson Pierre  : 1980  MRN: [id number]  Date: 2024    Reason for exam/History:  Growth    Ultrasound images are reviewed.  There is noted to be a viable   intrauterine  pregnancy. The pregnancy is measuring 33 weeks 2 days   gestation.  The estimated fetal weight is 2053 grams at the 81.1 % for   growth.  The HC was at the 79.1 % and the AC was at the 77.8 %.  The   amniotic fluid index was 18.28 cms.  The fetal heart rate was normal.     The infant was in the vertex presentation.  The placental location was   noted to be anterior.      The exam limitations noted:  none    See ultrasound report for measurements and structures identified.    Hanh Mccarty MD, [facility]  OB GYN [place]      Medical Records:  None    Assessment and Plan:  Problem List Items Addressed This Visit          Endocrine and Metabolic    Gestational diabetes mellitus, class A2  Patient to continue her current medication.  Continue  testing twice weekly.  NST as noted.  BPP next visit.       Gravid and     AMA (advanced maternal age) multigravida 35+    Excessive fetal growth affecting management of mother in third trimester, antepartum       Pulmonary and Pneumonias    Moderate persistent asthma without complication (Chronic)     Other Visit Diagnoses       Encounter for supervision of high risk pregnancy in third trimester, antepartum    -  Primary  Topics discussed:     GERD management  glucose management  iron supplementation  kick counts and fetal movement  PIH precautions   labor signs and symptoms      Left foot pain      X-ray of the foot and ankle.  Patient also informed and ice as well as Tylenol every 8 hours.  Recommend elevation of the foot and off foot.  Plan pending results.    Pruritus      Labs as noted.  Plan pending results.    Relevant Orders    Comprehensive Metabolic Panel    Bile Acids, Total    CBC (No Diff)    Comprehensive Metabolic Panel    Seizure disorder      Patient to continue her Lamictal.          Follow Up/Instructions:  Follow up as scheduled.  Patient was given instructions and counseling regarding her condition or for health  maintenance advice. Please see specific information pulled into the AVS if appropriate.     Note: Speech recognition transcription software may have been used to dictate portions of this document.  An attempt at proofreading has been made though minor errors in transcription may still be present.    This note was electronically signed.  Hanh Mccarty M.D.

## 2024-09-03 NOTE — NON STRESS TEST
Triage Note - Nursing Documentation  Labor and Delivery Admission Log    Marlin Silva  : 1980  MRN: [id number]  CSN: [id number]    Date in / Time in:  9/3/2024  Time in:     Date out / Time out:    Time out:     Nurse: Marley Casanova RN    Patient Info: She is a 44 y.o. year old  at 32w4d with an BILL of 10/25/2024, by Ultrasound who was seen on the [facility].    Chief Complaint:   Chief Complaint   Patient presents with    Non-stress Test     AMA, Gestational Diabetes       Provider Instructions / Disposition:     Patient educated on  labor, po hydration and fetal movement. Patient verbalized understanding to instructions and signed. Patient has scheduled appointment on 2024. Patient instructed if any change in condition; patient to return to [facility] for assessment/evaluation. - Marley Casanova RN.    Patient Active Problem List   Diagnosis    Non-recurrent unilateral inguinal hernia without obstruction or gangrene    Chest pain, atypical    Moderate persistent asthma without complication    Anxiety disorder    Fibromyalgia    AMA (advanced maternal age) multigravida 35+    Excessive fetal growth affecting management of mother in third trimester, antepartum    Gestational diabetes mellitus, class A2       NST Documentation (Only applicable > 32 weeks): Interpretation A  Nonstress Test Interpretation A: Reactive (24 : Marley Casanova, RN)

## 2024-09-04 LAB
ALBUMIN SERPL-MCNC: 3.5 G/DL (ref 3.5–5.2)
ALBUMIN/GLOB SERPL: 1.2 G/DL
ALP SERPL-CCNC: 124 U/L (ref 39–117)
ALT SERPL W P-5'-P-CCNC: 24 U/L (ref 1–33)
ANION GAP SERPL CALCULATED.3IONS-SCNC: 9.8 MMOL/L (ref 5–15)
AST SERPL-CCNC: 24 U/L (ref 1–32)
BILE AC SERPL-SCNC: 2 UMOL/L (ref 0–10)
BILIRUB SERPL-MCNC: 0.3 MG/DL (ref 0–1.2)
BUN SERPL-MCNC: 18 MG/DL (ref 6–20)
BUN/CREAT SERPL: 24.3 (ref 7–25)
CALCIUM SPEC-SCNC: 9.2 MG/DL (ref 8.6–10.5)
CHLORIDE SERPL-SCNC: 105 MMOL/L (ref 98–107)
CO2 SERPL-SCNC: 20.2 MMOL/L (ref 22–29)
CREAT SERPL-MCNC: 0.74 MG/DL (ref 0.57–1)
DEPRECATED RDW RBC AUTO: 41.3 FL (ref 37–54)
EGFRCR SERPLBLD CKD-EPI 2021: 102.5 ML/MIN/1.73
ERYTHROCYTE [DISTWIDTH] IN BLOOD BY AUTOMATED COUNT: 14.2 % (ref 12.3–15.4)
GLOBULIN UR ELPH-MCNC: 3 GM/DL
GLUCOSE SERPL-MCNC: 75 MG/DL (ref 65–99)
HCT VFR BLD AUTO: 32.8 % (ref 34–46.6)
HGB BLD-MCNC: 10.5 G/DL (ref 12–15.9)
MCH RBC QN AUTO: 25.9 PG (ref 26.6–33)
MCHC RBC AUTO-ENTMCNC: 32 G/DL (ref 31.5–35.7)
MCV RBC AUTO: 81 FL (ref 79–97)
PLATELET # BLD AUTO: 313 10*3/MM3 (ref 140–450)
PMV BLD AUTO: 9.9 FL (ref 6–12)
POTASSIUM SERPL-SCNC: 4.1 MMOL/L (ref 3.5–5.2)
PROT SERPL-MCNC: 6.5 G/DL (ref 6–8.5)
RBC # BLD AUTO: 4.05 10*6/MM3 (ref 3.77–5.28)
SODIUM SERPL-SCNC: 135 MMOL/L (ref 136–145)
WBC NRBC COR # BLD AUTO: 8.86 10*3/MM3 (ref 3.4–10.8)

## 2024-09-06 ENCOUNTER — ROUTINE PRENATAL (OUTPATIENT)
Dept: OBSTETRICS AND GYNECOLOGY | Facility: CLINIC | Age: 44
End: 2024-09-06
Payer: COMMERCIAL

## 2024-09-06 VITALS — SYSTOLIC BLOOD PRESSURE: 142 MMHG | WEIGHT: 187.4 LBS | DIASTOLIC BLOOD PRESSURE: 86 MMHG | BODY MASS INDEX: 33.2 KG/M2

## 2024-09-06 DIAGNOSIS — O24.419 GDM, CLASS A2: Primary | ICD-10-CM

## 2024-09-06 NOTE — PROGRESS NOTES
Chief Complaint   Patient presents with    Routine Prenatal Visit     Prenatal visit with BPP done today. No problems or concerns        HPI:   , 33w0d gestation reports doing well    ROS:  See Prenatal Episode/Flowsheet  /86   Wt 85 kg (187 lb 6.4 oz)   LMP 2023   BMI 33.20 kg/m²      EXAM:  EXTREMITIES:  No swelling-See Prenatal Episode/Flowsheet    ABDOMEN:  FHTs/Movement noted-See Prenatal Episode/Flowsheet    URINE GLUCOSE/PROTEIN:  See Prenatal Episode/Flowsheet    PELVIC EXAM:  See Prenatal Episode/Flowsheet  CV:  Lungs:  GYN:    MDM:    Lab Results   Component Value Date    HGB 10.5 (L) 2024    RUBELLAABIGG 4.63 2024    HEPBSAG Negative 2024    ABO O 2024    RH Positive 2024    ABSCRN Negative 2024    NRP5IBE2 Non Reactive 2024    HEPCVIRUSABY Non Reactive 2024    KMO6IMCI 213 (H) 2024    URINECX Final report 2024       U/S BPPs 8 out of 8.  JOSE is 16.3.  Vertex.  Active fetus    1. IUP 33w0d  2. Routine care   3. A2 GDM: Fingerstick blood sugars normal.  Fastings 80s to 90s.  Postprandials 120s.   Metformin 1000 mg p.o. nightly  4.  AMA:  5.  Modest BP elevation will continue to monitor  6.  Repeat growth at about 35 weeks  7.  Seizure disorder: Lamictal  8.  Anemia: Taking vitamins and iron

## 2024-09-10 ENCOUNTER — HOSPITAL ENCOUNTER (OUTPATIENT)
Facility: HOSPITAL | Age: 44
Discharge: HOME | End: 2024-09-10
Attending: MIDWIFE | Admitting: MIDWIFE
Payer: COMMERCIAL

## 2024-09-10 VITALS
OXYGEN SATURATION: 98 % | TEMPERATURE: 98 F | HEART RATE: 78 BPM | HEIGHT: 63 IN | SYSTOLIC BLOOD PRESSURE: 130 MMHG | WEIGHT: 190.8 LBS | DIASTOLIC BLOOD PRESSURE: 64 MMHG | BODY MASS INDEX: 33.81 KG/M2

## 2024-09-10 LAB
BILIRUB BLD-MCNC: NEGATIVE MG/DL
CLARITY, POC: CLEAR
COLOR UR: YELLOW
GLUCOSE UR STRIP-MCNC: NEGATIVE MG/DL
KETONES UR QL: NEGATIVE
LEUKOCYTE EST, POC: NEGATIVE
NITRITE UR-MCNC: NEGATIVE MG/ML
PH UR: 6 [PH] (ref 5–8)
PROT UR STRIP-MCNC: NEGATIVE MG/DL
RBC # UR STRIP: NEGATIVE /UL
SP GR UR: 1.01 (ref 1–1.03)
UROBILINOGEN UR QL: NORMAL

## 2024-09-10 PROCEDURE — 59025 FETAL NON-STRESS TEST: CPT

## 2024-09-10 PROCEDURE — G0463 HOSPITAL OUTPT CLINIC VISIT: HCPCS

## 2024-09-10 PROCEDURE — 81002 URINALYSIS NONAUTO W/O SCOPE: CPT | Performed by: MIDWIFE

## 2024-09-10 PROCEDURE — 59025 FETAL NON-STRESS TEST: CPT | Performed by: MIDWIFE

## 2024-09-10 RX ORDER — SODIUM CHLORIDE 0.9 % (FLUSH) 0.9 %
10 SYRINGE (ML) INJECTION AS NEEDED
Status: DISCONTINUED | OUTPATIENT
Start: 2024-09-10 | End: 2024-09-10 | Stop reason: HOSPADM

## 2024-09-10 RX ORDER — SODIUM CHLORIDE 0.9 % (FLUSH) 0.9 %
10 SYRINGE (ML) INJECTION EVERY 12 HOURS SCHEDULED
Status: DISCONTINUED | OUTPATIENT
Start: 2024-09-10 | End: 2024-09-10 | Stop reason: HOSPADM

## 2024-09-10 RX ORDER — LIDOCAINE HYDROCHLORIDE 10 MG/ML
0.5 INJECTION, SOLUTION INFILTRATION; PERINEURAL ONCE AS NEEDED
Status: DISCONTINUED | OUTPATIENT
Start: 2024-09-10 | End: 2024-09-10 | Stop reason: HOSPADM

## 2024-09-10 RX ORDER — SODIUM CHLORIDE 9 MG/ML
40 INJECTION, SOLUTION INTRAVENOUS AS NEEDED
Status: DISCONTINUED | OUTPATIENT
Start: 2024-09-10 | End: 2024-09-10 | Stop reason: HOSPADM

## 2024-09-10 NOTE — NON STRESS TEST
"Triage Note - Nursing Documentation  Labor and Delivery Admission Log    Marlin Silva  : 1980  MRN: [id number]  CSN: [id number]    Date in / Time in:  9/10/2024  Time in:     Date out / Time out:    Time out:     Nurse: Moni Lamar RN    Patient Info: She is a 44 y.o. year old  at 33w4d with an BILL of 10/25/2024, by Ultrasound who was seen on the [facility].    Chief Complaint:   Chief Complaint   Patient presents with    Non-stress Test     \"GDM and my blood pressure is high\"       Provider Instructions / Disposition: Pt presents for NST related to AMA/GDM/Elevated BP. NST reactive. Moderate variability present. Fasting glucose this morning was 93. Pt reports 1 hour postprandial glucose usually runs in the 120's. Currently on Metformin. Per provider, pt may be discharged home. Pt educated on signs of labor, importance of fetal kick counts, GDM, and hypertension in pregnancy. Pt verbalized understanding.     Patient Active Problem List   Diagnosis    Non-recurrent unilateral inguinal hernia without obstruction or gangrene    Chest pain, atypical    Moderate persistent asthma without complication    Anxiety disorder    Fibromyalgia    AMA (advanced maternal age) multigravida 35+    Excessive fetal growth affecting management of mother in third trimester, antepartum    Gestational diabetes mellitus, class A2       NST Documentation (Only applicable > 32 weeks): Interpretation A  Nonstress Test Interpretation A: Reactive (09/10/24 1830 : Moni Lamar, RN)    "

## 2024-09-13 ENCOUNTER — ROUTINE PRENATAL (OUTPATIENT)
Dept: OBSTETRICS AND GYNECOLOGY | Facility: CLINIC | Age: 44
End: 2024-09-13
Payer: COMMERCIAL

## 2024-09-13 VITALS — SYSTOLIC BLOOD PRESSURE: 136 MMHG | BODY MASS INDEX: 32.95 KG/M2 | DIASTOLIC BLOOD PRESSURE: 88 MMHG | WEIGHT: 186 LBS

## 2024-09-13 DIAGNOSIS — Z34.93 THIRD TRIMESTER PREGNANCY: Primary | ICD-10-CM

## 2024-09-17 ENCOUNTER — HOSPITAL ENCOUNTER (OUTPATIENT)
Facility: HOSPITAL | Age: 44
Discharge: HOME | End: 2024-09-17
Attending: MIDWIFE | Admitting: MIDWIFE
Payer: COMMERCIAL

## 2024-09-17 VITALS
OXYGEN SATURATION: 100 % | WEIGHT: 187.2 LBS | DIASTOLIC BLOOD PRESSURE: 51 MMHG | SYSTOLIC BLOOD PRESSURE: 120 MMHG | BODY MASS INDEX: 33.16 KG/M2 | HEART RATE: 73 BPM | TEMPERATURE: 98.4 F | RESPIRATION RATE: 16 BRPM

## 2024-09-17 LAB
BILIRUB BLD-MCNC: NEGATIVE MG/DL
CLARITY, POC: CLEAR
COLOR UR: YELLOW
GLUCOSE UR STRIP-MCNC: NEGATIVE MG/DL
KETONES UR QL: ABNORMAL
LEUKOCYTE EST, POC: NEGATIVE
NITRITE UR-MCNC: NEGATIVE MG/ML
PH UR: 6 [PH] (ref 5–8)
PROT UR STRIP-MCNC: ABNORMAL MG/DL
RBC # UR STRIP: NEGATIVE /UL
SP GR UR: 1.01 (ref 1–1.03)
UROBILINOGEN UR QL: NORMAL

## 2024-09-17 PROCEDURE — 81002 URINALYSIS NONAUTO W/O SCOPE: CPT | Performed by: MIDWIFE

## 2024-09-17 PROCEDURE — 59025 FETAL NON-STRESS TEST: CPT

## 2024-09-17 PROCEDURE — G0463 HOSPITAL OUTPT CLINIC VISIT: HCPCS

## 2024-09-17 RX ORDER — SODIUM CHLORIDE 0.9 % (FLUSH) 0.9 %
10 SYRINGE (ML) INJECTION AS NEEDED
Status: DISCONTINUED | OUTPATIENT
Start: 2024-09-17 | End: 2024-09-17 | Stop reason: HOSPADM

## 2024-09-17 RX ORDER — SODIUM CHLORIDE 0.9 % (FLUSH) 0.9 %
10 SYRINGE (ML) INJECTION EVERY 12 HOURS SCHEDULED
Status: DISCONTINUED | OUTPATIENT
Start: 2024-09-17 | End: 2024-09-17 | Stop reason: HOSPADM

## 2024-09-17 RX ORDER — LIDOCAINE HYDROCHLORIDE 10 MG/ML
0.5 INJECTION, SOLUTION INFILTRATION; PERINEURAL ONCE AS NEEDED
Status: DISCONTINUED | OUTPATIENT
Start: 2024-09-17 | End: 2024-09-17 | Stop reason: HOSPADM

## 2024-09-17 RX ORDER — SODIUM CHLORIDE 9 MG/ML
40 INJECTION, SOLUTION INTRAVENOUS AS NEEDED
Status: DISCONTINUED | OUTPATIENT
Start: 2024-09-17 | End: 2024-09-17 | Stop reason: HOSPADM

## 2024-09-18 ENCOUNTER — TELEPHONE (OUTPATIENT)
Dept: OBSTETRICS AND GYNECOLOGY | Facility: CLINIC | Age: 44
End: 2024-09-18
Payer: COMMERCIAL

## 2024-09-18 ENCOUNTER — HOSPITAL ENCOUNTER (OUTPATIENT)
Facility: HOSPITAL | Age: 44
Discharge: HOME | End: 2024-09-18
Attending: OBSTETRICS & GYNECOLOGY | Admitting: OBSTETRICS & GYNECOLOGY
Payer: COMMERCIAL

## 2024-09-18 VITALS
OXYGEN SATURATION: 97 % | DIASTOLIC BLOOD PRESSURE: 60 MMHG | TEMPERATURE: 98.2 F | HEIGHT: 63 IN | WEIGHT: 186.8 LBS | SYSTOLIC BLOOD PRESSURE: 135 MMHG | HEART RATE: 73 BPM | BODY MASS INDEX: 33.1 KG/M2

## 2024-09-18 LAB
ALBUMIN SERPL-MCNC: 3.3 G/DL (ref 3.5–5.2)
ALBUMIN/GLOB SERPL: 1.3 G/DL
ALP SERPL-CCNC: 141 U/L (ref 39–117)
ALT SERPL W P-5'-P-CCNC: 16 U/L (ref 1–33)
ANION GAP SERPL CALCULATED.3IONS-SCNC: 13.6 MMOL/L (ref 5–15)
AST SERPL-CCNC: 18 U/L (ref 1–32)
BASOPHILS # BLD AUTO: 0.04 10*3/MM3 (ref 0–0.2)
BASOPHILS NFR BLD AUTO: 0.5 % (ref 0–1.5)
BILIRUB BLD-MCNC: NEGATIVE MG/DL
BILIRUB SERPL-MCNC: 0.3 MG/DL (ref 0–1.2)
BILIRUB UR QL STRIP: NEGATIVE
BUN SERPL-MCNC: 19 MG/DL (ref 6–20)
BUN/CREAT SERPL: 22.9 (ref 7–25)
CALCIUM SPEC-SCNC: 8.5 MG/DL (ref 8.6–10.5)
CHLORIDE SERPL-SCNC: 104 MMOL/L (ref 98–107)
CLARITY UR: CLEAR
CLARITY, POC: CLEAR
CO2 SERPL-SCNC: 20.4 MMOL/L (ref 22–29)
COLOR UR: ABNORMAL
COLOR UR: YELLOW
CREAT SERPL-MCNC: 0.83 MG/DL (ref 0.57–1)
CREAT UR-MCNC: 82 MG/DL
DEPRECATED RDW RBC AUTO: 40 FL (ref 37–54)
EGFRCR SERPLBLD CKD-EPI 2021: 89.3 ML/MIN/1.73
EOSINOPHIL # BLD AUTO: 0.21 10*3/MM3 (ref 0–0.4)
EOSINOPHIL NFR BLD AUTO: 2.6 % (ref 0.3–6.2)
ERYTHROCYTE [DISTWIDTH] IN BLOOD BY AUTOMATED COUNT: 14.1 % (ref 12.3–15.4)
GLOBULIN UR ELPH-MCNC: 2.6 GM/DL
GLUCOSE SERPL-MCNC: 76 MG/DL (ref 65–99)
GLUCOSE UR STRIP-MCNC: NEGATIVE MG/DL
GLUCOSE UR STRIP-MCNC: NEGATIVE MG/DL
HCT VFR BLD AUTO: 29.2 % (ref 34–46.6)
HGB BLD-MCNC: 9.7 G/DL (ref 12–15.9)
HGB UR QL STRIP.AUTO: NEGATIVE
IMM GRANULOCYTES # BLD AUTO: 0.04 10*3/MM3 (ref 0–0.05)
IMM GRANULOCYTES NFR BLD AUTO: 0.5 % (ref 0–0.5)
KETONES UR QL STRIP: ABNORMAL
KETONES UR QL: ABNORMAL
LEUKOCYTE EST, POC: NEGATIVE
LEUKOCYTE ESTERASE UR QL STRIP.AUTO: NEGATIVE
LYMPHOCYTES # BLD AUTO: 1.4 10*3/MM3 (ref 0.7–3.1)
LYMPHOCYTES NFR BLD AUTO: 17.4 % (ref 19.6–45.3)
MCH RBC QN AUTO: 26.1 PG (ref 26.6–33)
MCHC RBC AUTO-ENTMCNC: 33.2 G/DL (ref 31.5–35.7)
MCV RBC AUTO: 78.5 FL (ref 79–97)
MONOCYTES # BLD AUTO: 0.53 10*3/MM3 (ref 0.1–0.9)
MONOCYTES NFR BLD AUTO: 6.6 % (ref 5–12)
NEUTROPHILS NFR BLD AUTO: 5.81 10*3/MM3 (ref 1.7–7)
NEUTROPHILS NFR BLD AUTO: 72.4 % (ref 42.7–76)
NITRITE UR QL STRIP: NEGATIVE
NITRITE UR-MCNC: NEGATIVE MG/ML
NRBC BLD AUTO-RTO: 0 /100 WBC (ref 0–0.2)
PH UR STRIP.AUTO: 6 [PH] (ref 5–8)
PH UR: 6 [PH] (ref 5–8)
PLATELET # BLD AUTO: 227 10*3/MM3 (ref 140–450)
PMV BLD AUTO: 9.7 FL (ref 6–12)
POTASSIUM SERPL-SCNC: 3.9 MMOL/L (ref 3.5–5.2)
PROT ?TM UR-MCNC: 24 MG/DL
PROT SERPL-MCNC: 5.9 G/DL (ref 6–8.5)
PROT UR QL STRIP: ABNORMAL
PROT UR STRIP-MCNC: ABNORMAL MG/DL
PROT/CREAT UR: 292.7 MG/G CREA (ref 0–200)
RBC # BLD AUTO: 3.72 10*6/MM3 (ref 3.77–5.28)
RBC # UR STRIP: NEGATIVE /UL
SODIUM SERPL-SCNC: 138 MMOL/L (ref 136–145)
SP GR UR STRIP: 1.02 (ref 1–1.03)
SP GR UR: 1.01 (ref 1–1.03)
UROBILINOGEN UR QL STRIP: ABNORMAL
UROBILINOGEN UR QL: NORMAL
WBC NRBC COR # BLD AUTO: 8.03 10*3/MM3 (ref 3.4–10.8)

## 2024-09-18 PROCEDURE — 81002 URINALYSIS NONAUTO W/O SCOPE: CPT | Performed by: OBSTETRICS & GYNECOLOGY

## 2024-09-18 PROCEDURE — 59025 FETAL NON-STRESS TEST: CPT

## 2024-09-18 PROCEDURE — 36415 COLL VENOUS BLD VENIPUNCTURE: CPT | Performed by: OBSTETRICS & GYNECOLOGY

## 2024-09-18 PROCEDURE — 82570 ASSAY OF URINE CREATININE: CPT | Performed by: OBSTETRICS & GYNECOLOGY

## 2024-09-18 PROCEDURE — G0463 HOSPITAL OUTPT CLINIC VISIT: HCPCS

## 2024-09-18 PROCEDURE — 81003 URINALYSIS AUTO W/O SCOPE: CPT | Performed by: OBSTETRICS & GYNECOLOGY

## 2024-09-18 PROCEDURE — 84156 ASSAY OF PROTEIN URINE: CPT | Performed by: OBSTETRICS & GYNECOLOGY

## 2024-09-18 PROCEDURE — 85025 COMPLETE CBC W/AUTO DIFF WBC: CPT | Performed by: OBSTETRICS & GYNECOLOGY

## 2024-09-18 PROCEDURE — 80053 COMPREHEN METABOLIC PANEL: CPT | Performed by: OBSTETRICS & GYNECOLOGY

## 2024-09-18 RX ORDER — SODIUM CHLORIDE 0.9 % (FLUSH) 0.9 %
10 SYRINGE (ML) INJECTION EVERY 12 HOURS SCHEDULED
Status: DISCONTINUED | OUTPATIENT
Start: 2024-09-18 | End: 2024-09-18 | Stop reason: HOSPADM

## 2024-09-18 RX ORDER — SODIUM CHLORIDE 0.9 % (FLUSH) 0.9 %
10 SYRINGE (ML) INJECTION AS NEEDED
Status: DISCONTINUED | OUTPATIENT
Start: 2024-09-18 | End: 2024-09-18 | Stop reason: HOSPADM

## 2024-09-18 RX ORDER — LIDOCAINE HYDROCHLORIDE 10 MG/ML
0.5 INJECTION, SOLUTION INFILTRATION; PERINEURAL ONCE AS NEEDED
Status: DISCONTINUED | OUTPATIENT
Start: 2024-09-18 | End: 2024-09-18 | Stop reason: HOSPADM

## 2024-09-18 RX ORDER — SODIUM CHLORIDE 9 MG/ML
40 INJECTION, SOLUTION INTRAVENOUS AS NEEDED
Status: DISCONTINUED | OUTPATIENT
Start: 2024-09-18 | End: 2024-09-18 | Stop reason: HOSPADM

## 2024-09-20 ENCOUNTER — ROUTINE PRENATAL (OUTPATIENT)
Dept: OBSTETRICS AND GYNECOLOGY | Facility: CLINIC | Age: 44
End: 2024-09-20
Payer: COMMERCIAL

## 2024-09-20 VITALS — WEIGHT: 185 LBS | SYSTOLIC BLOOD PRESSURE: 130 MMHG | DIASTOLIC BLOOD PRESSURE: 70 MMHG | BODY MASS INDEX: 32.77 KG/M2

## 2024-09-20 DIAGNOSIS — O24.419 GESTATIONAL DIABETES MELLITUS, CLASS A2: Primary | ICD-10-CM

## 2024-09-20 DIAGNOSIS — O36.63X0 EXCESSIVE FETAL GROWTH AFFECTING MANAGEMENT OF PREGNANCY IN THIRD TRIMESTER, SINGLE OR UNSPECIFIED FETUS: ICD-10-CM

## 2024-09-20 DIAGNOSIS — O09.523 MULTIGRAVIDA OF ADVANCED MATERNAL AGE IN THIRD TRIMESTER: ICD-10-CM

## 2024-09-23 ENCOUNTER — HOSPITAL ENCOUNTER (OUTPATIENT)
Facility: HOSPITAL | Age: 44
Discharge: HOME | End: 2024-09-23
Attending: OBSTETRICS & GYNECOLOGY | Admitting: OBSTETRICS & GYNECOLOGY
Payer: COMMERCIAL

## 2024-09-23 VITALS
RESPIRATION RATE: 18 BRPM | HEART RATE: 73 BPM | DIASTOLIC BLOOD PRESSURE: 65 MMHG | WEIGHT: 187 LBS | BODY MASS INDEX: 33.13 KG/M2 | TEMPERATURE: 98.2 F | HEIGHT: 63 IN | SYSTOLIC BLOOD PRESSURE: 134 MMHG | OXYGEN SATURATION: 95 %

## 2024-09-23 PROCEDURE — 59025 FETAL NON-STRESS TEST: CPT | Performed by: OBSTETRICS & GYNECOLOGY

## 2024-09-23 PROCEDURE — 59025 FETAL NON-STRESS TEST: CPT

## 2024-09-23 PROCEDURE — G0463 HOSPITAL OUTPT CLINIC VISIT: HCPCS

## 2024-09-23 RX ORDER — SODIUM CHLORIDE 0.9 % (FLUSH) 0.9 %
10 SYRINGE (ML) INJECTION EVERY 12 HOURS SCHEDULED
Status: DISCONTINUED | OUTPATIENT
Start: 2024-09-23 | End: 2024-09-23 | Stop reason: HOSPADM

## 2024-09-23 RX ORDER — SODIUM CHLORIDE 0.9 % (FLUSH) 0.9 %
10 SYRINGE (ML) INJECTION AS NEEDED
Status: DISCONTINUED | OUTPATIENT
Start: 2024-09-23 | End: 2024-09-23 | Stop reason: HOSPADM

## 2024-09-23 RX ORDER — SODIUM CHLORIDE 9 MG/ML
40 INJECTION, SOLUTION INTRAVENOUS AS NEEDED
Status: DISCONTINUED | OUTPATIENT
Start: 2024-09-23 | End: 2024-09-23 | Stop reason: HOSPADM

## 2024-09-23 RX ORDER — LIDOCAINE HYDROCHLORIDE 10 MG/ML
0.5 INJECTION, SOLUTION INFILTRATION; PERINEURAL ONCE AS NEEDED
Status: DISCONTINUED | OUTPATIENT
Start: 2024-09-23 | End: 2024-09-23 | Stop reason: HOSPADM

## 2024-09-27 ENCOUNTER — HOSPITAL ENCOUNTER (OUTPATIENT)
Facility: HOSPITAL | Age: 44
Discharge: HOME | End: 2024-09-27
Attending: MIDWIFE | Admitting: MIDWIFE
Payer: COMMERCIAL

## 2024-09-27 ENCOUNTER — ROUTINE PRENATAL (OUTPATIENT)
Dept: OBSTETRICS AND GYNECOLOGY | Facility: CLINIC | Age: 44
End: 2024-09-27
Payer: COMMERCIAL

## 2024-09-27 VITALS — BODY MASS INDEX: 32.7 KG/M2 | SYSTOLIC BLOOD PRESSURE: 170 MMHG | DIASTOLIC BLOOD PRESSURE: 90 MMHG | WEIGHT: 184.6 LBS

## 2024-09-27 VITALS
HEIGHT: 63 IN | SYSTOLIC BLOOD PRESSURE: 130 MMHG | BODY MASS INDEX: 32.96 KG/M2 | WEIGHT: 186 LBS | HEART RATE: 71 BPM | OXYGEN SATURATION: 98 % | DIASTOLIC BLOOD PRESSURE: 70 MMHG | TEMPERATURE: 98.3 F | RESPIRATION RATE: 16 BRPM

## 2024-09-27 DIAGNOSIS — O09.523 MULTIGRAVIDA OF ADVANCED MATERNAL AGE IN THIRD TRIMESTER: ICD-10-CM

## 2024-09-27 DIAGNOSIS — Z36.85 ENCOUNTER FOR ANTENATAL SCREENING FOR STREPTOCOCCUS B: Primary | ICD-10-CM

## 2024-09-27 DIAGNOSIS — O24.419 GESTATIONAL DIABETES MELLITUS, CLASS A2: ICD-10-CM

## 2024-09-27 LAB
ALBUMIN SERPL-MCNC: 3.2 G/DL (ref 3.5–5.2)
ALBUMIN/GLOB SERPL: 1 G/DL
ALP SERPL-CCNC: 183 U/L (ref 39–117)
ALT SERPL W P-5'-P-CCNC: 23 U/L (ref 1–33)
ANION GAP SERPL CALCULATED.3IONS-SCNC: 14.9 MMOL/L (ref 5–15)
AST SERPL-CCNC: 22 U/L (ref 1–32)
BASOPHILS # BLD AUTO: 0.04 10*3/MM3 (ref 0–0.2)
BASOPHILS NFR BLD AUTO: 0.6 % (ref 0–1.5)
BILIRUB SERPL-MCNC: 0.3 MG/DL (ref 0–1.2)
BUN SERPL-MCNC: 17 MG/DL (ref 6–20)
BUN/CREAT SERPL: 23.3 (ref 7–25)
CALCIUM SPEC-SCNC: 9.1 MG/DL (ref 8.6–10.5)
CHLORIDE SERPL-SCNC: 101 MMOL/L (ref 98–107)
CO2 SERPL-SCNC: 19.1 MMOL/L (ref 22–29)
CREAT SERPL-MCNC: 0.73 MG/DL (ref 0.57–1)
CREAT UR-MCNC: 64.2 MG/DL
DEPRECATED RDW RBC AUTO: 39.9 FL (ref 37–54)
EGFRCR SERPLBLD CKD-EPI 2021: 104.1 ML/MIN/1.73
EOSINOPHIL # BLD AUTO: 0.18 10*3/MM3 (ref 0–0.4)
EOSINOPHIL NFR BLD AUTO: 2.5 % (ref 0.3–6.2)
ERYTHROCYTE [DISTWIDTH] IN BLOOD BY AUTOMATED COUNT: 14.6 % (ref 12.3–15.4)
GLOBULIN UR ELPH-MCNC: 3.1 GM/DL
GLUCOSE SERPL-MCNC: 72 MG/DL (ref 65–99)
HCT VFR BLD AUTO: 31.9 % (ref 34–46.6)
HGB BLD-MCNC: 10.4 G/DL (ref 12–15.9)
IMM GRANULOCYTES # BLD AUTO: 0.06 10*3/MM3 (ref 0–0.05)
IMM GRANULOCYTES NFR BLD AUTO: 0.8 % (ref 0–0.5)
LYMPHOCYTES # BLD AUTO: 1.37 10*3/MM3 (ref 0.7–3.1)
LYMPHOCYTES NFR BLD AUTO: 19 % (ref 19.6–45.3)
MCH RBC QN AUTO: 25.3 PG (ref 26.6–33)
MCHC RBC AUTO-ENTMCNC: 32.6 G/DL (ref 31.5–35.7)
MCV RBC AUTO: 77.6 FL (ref 79–97)
MONOCYTES # BLD AUTO: 0.46 10*3/MM3 (ref 0.1–0.9)
MONOCYTES NFR BLD AUTO: 6.4 % (ref 5–12)
NEUTROPHILS NFR BLD AUTO: 5.11 10*3/MM3 (ref 1.7–7)
NEUTROPHILS NFR BLD AUTO: 70.7 % (ref 42.7–76)
NRBC BLD AUTO-RTO: 0.4 /100 WBC (ref 0–0.2)
PLATELET # BLD AUTO: 267 10*3/MM3 (ref 140–450)
PMV BLD AUTO: 9.6 FL (ref 6–12)
POTASSIUM SERPL-SCNC: 4 MMOL/L (ref 3.5–5.2)
PROT ?TM UR-MCNC: 17 MG/DL
PROT SERPL-MCNC: 6.3 G/DL (ref 6–8.5)
PROT/CREAT UR: 264.8 MG/G CREA (ref 0–200)
RBC # BLD AUTO: 4.11 10*6/MM3 (ref 3.77–5.28)
SODIUM SERPL-SCNC: 135 MMOL/L (ref 136–145)
WBC NRBC COR # BLD AUTO: 7.22 10*3/MM3 (ref 3.4–10.8)

## 2024-09-27 PROCEDURE — 85025 COMPLETE CBC W/AUTO DIFF WBC: CPT | Performed by: MIDWIFE

## 2024-09-27 PROCEDURE — 82570 ASSAY OF URINE CREATININE: CPT | Performed by: MIDWIFE

## 2024-09-27 PROCEDURE — G0463 HOSPITAL OUTPT CLINIC VISIT: HCPCS

## 2024-09-27 PROCEDURE — 80053 COMPREHEN METABOLIC PANEL: CPT | Performed by: MIDWIFE

## 2024-09-27 PROCEDURE — 36415 COLL VENOUS BLD VENIPUNCTURE: CPT | Performed by: MIDWIFE

## 2024-09-27 PROCEDURE — 59025 FETAL NON-STRESS TEST: CPT

## 2024-09-27 PROCEDURE — 84156 ASSAY OF PROTEIN URINE: CPT | Performed by: MIDWIFE

## 2024-09-27 RX ORDER — LIDOCAINE HYDROCHLORIDE 10 MG/ML
0.5 INJECTION, SOLUTION INFILTRATION; PERINEURAL ONCE AS NEEDED
Status: DISCONTINUED | OUTPATIENT
Start: 2024-09-27 | End: 2024-09-27 | Stop reason: HOSPADM

## 2024-09-27 RX ORDER — SODIUM CHLORIDE 0.9 % (FLUSH) 0.9 %
10 SYRINGE (ML) INJECTION AS NEEDED
Status: DISCONTINUED | OUTPATIENT
Start: 2024-09-27 | End: 2024-09-27 | Stop reason: HOSPADM

## 2024-09-27 RX ORDER — SODIUM CHLORIDE 9 MG/ML
40 INJECTION, SOLUTION INTRAVENOUS AS NEEDED
Status: DISCONTINUED | OUTPATIENT
Start: 2024-09-27 | End: 2024-09-27 | Stop reason: HOSPADM

## 2024-09-27 RX ORDER — SODIUM CHLORIDE 0.9 % (FLUSH) 0.9 %
10 SYRINGE (ML) INJECTION EVERY 12 HOURS SCHEDULED
Status: DISCONTINUED | OUTPATIENT
Start: 2024-09-27 | End: 2024-09-27 | Stop reason: HOSPADM

## 2024-09-27 NOTE — NON STRESS TEST
Triage Note - Nursing Documentation  Labor and Delivery Admission Log    Marlin Silva  : 1980  MRN: [id number]  CSN: [id number]    Date in / Time in:  2024  Time in: 1525    Date out / Time out:    Time out: 1702    Nurse: Missy Owusu RN    Patient Info: She is a 44 y.o. year old  at 36w0d with an BILL of 10/25/2024, by Ultrasound who was seen on the [facility].    Chief Complaint:   Chief Complaint   Patient presents with    Hypertension-Pregnant     Patient from office by Dr. Gideon David for NST, PIH labs and serial blood pressures.       Provider Instructions / Disposition: PO hydration, labs, VS and EFM reviewed. DC home, FU as scheduled.     Patient Active Problem List   Diagnosis    Non-recurrent unilateral inguinal hernia without obstruction or gangrene    Chest pain, atypical    Moderate persistent asthma without complication    Anxiety disorder    Fibromyalgia    AMA (advanced maternal age) multigravida 35+    Excessive fetal growth affecting management of mother in third trimester, antepartum    Gestational diabetes mellitus, class A2       NST Documentation (Only applicable > 32 weeks): Interpretation A  Nonstress Test Interpretation A: Reactive (24 1652 : Missy Owusu, RN)

## 2024-09-30 ENCOUNTER — HOSPITAL ENCOUNTER (OUTPATIENT)
Facility: HOSPITAL | Age: 44
Discharge: HOME | End: 2024-09-30
Attending: STUDENT IN AN ORGANIZED HEALTH CARE EDUCATION/TRAINING PROGRAM | Admitting: STUDENT IN AN ORGANIZED HEALTH CARE EDUCATION/TRAINING PROGRAM
Payer: COMMERCIAL

## 2024-09-30 VITALS
BODY MASS INDEX: 32.95 KG/M2 | SYSTOLIC BLOOD PRESSURE: 127 MMHG | OXYGEN SATURATION: 98 % | RESPIRATION RATE: 18 BRPM | HEART RATE: 65 BPM | DIASTOLIC BLOOD PRESSURE: 58 MMHG | TEMPERATURE: 98 F | WEIGHT: 186 LBS

## 2024-09-30 LAB
BILIRUB BLD-MCNC: NEGATIVE MG/DL
CLARITY, POC: CLEAR
COLOR UR: ABNORMAL
GLUCOSE UR STRIP-MCNC: NEGATIVE MG/DL
KETONES UR QL: ABNORMAL
LEUKOCYTE EST, POC: NEGATIVE
NITRITE UR-MCNC: NEGATIVE MG/ML
PH UR: 6 [PH] (ref 5–8)
PROT UR STRIP-MCNC: ABNORMAL MG/DL
RBC # UR STRIP: NEGATIVE /UL
SP GR UR: 1.02 (ref 1–1.03)
UROBILINOGEN UR QL: NORMAL

## 2024-09-30 PROCEDURE — 59025 FETAL NON-STRESS TEST: CPT

## 2024-09-30 PROCEDURE — 59025 FETAL NON-STRESS TEST: CPT | Performed by: STUDENT IN AN ORGANIZED HEALTH CARE EDUCATION/TRAINING PROGRAM

## 2024-09-30 PROCEDURE — 81002 URINALYSIS NONAUTO W/O SCOPE: CPT | Performed by: STUDENT IN AN ORGANIZED HEALTH CARE EDUCATION/TRAINING PROGRAM

## 2024-09-30 RX ORDER — SODIUM CHLORIDE 9 MG/ML
40 INJECTION, SOLUTION INTRAVENOUS AS NEEDED
Status: DISCONTINUED | OUTPATIENT
Start: 2024-09-30 | End: 2024-09-30 | Stop reason: HOSPADM

## 2024-09-30 RX ORDER — LIDOCAINE HYDROCHLORIDE 10 MG/ML
0.5 INJECTION, SOLUTION INFILTRATION; PERINEURAL ONCE AS NEEDED
Status: DISCONTINUED | OUTPATIENT
Start: 2024-09-30 | End: 2024-09-30 | Stop reason: HOSPADM

## 2024-09-30 RX ORDER — SODIUM CHLORIDE 0.9 % (FLUSH) 0.9 %
10 SYRINGE (ML) INJECTION AS NEEDED
Status: DISCONTINUED | OUTPATIENT
Start: 2024-09-30 | End: 2024-09-30 | Stop reason: HOSPADM

## 2024-09-30 RX ORDER — SODIUM CHLORIDE 0.9 % (FLUSH) 0.9 %
10 SYRINGE (ML) INJECTION EVERY 12 HOURS SCHEDULED
Status: DISCONTINUED | OUTPATIENT
Start: 2024-09-30 | End: 2024-09-30 | Stop reason: HOSPADM

## 2024-09-30 NOTE — NON STRESS TEST
Triage Note - Nursing Documentation  Labor and Delivery Admission Log    Marlin Silva  : 1980  MRN: [id number]  CSN: [id number]    Date in / Time in:  2024  Time in:     Date out / Time out:    Time out:     Nurse: Deborah Hodges RN    Patient Info: She is a 44 y.o. year old  at 36w3d with an BILL of 10/25/2024, by Ultrasound who was seen on the [facility].    Chief Complaint:   Chief Complaint   Patient presents with    Non-stress Test     A2DM  GHTN  AMA       Provider Instructions / Disposition: Pt arrived for scheduled NST for GHTN, A2DM and AMA. Pt reports good fetal movement without cramping, ronny, leaking or bleeding. Vitals appropriate. Fasting FSBS 91 this AM. MAYUR Amos MD reviewed findings and gave RN verbal orders for pt to be discharged home.    Patient Active Problem List   Diagnosis    Non-recurrent unilateral inguinal hernia without obstruction or gangrene    Chest pain, atypical    Moderate persistent asthma without complication    Anxiety disorder    Fibromyalgia    AMA (advanced maternal age) multigravida 35+    Excessive fetal growth affecting management of mother in third trimester, antepartum    Gestational diabetes mellitus, class A2       NST Documentation (Only applicable > 32 weeks): Interpretation A  Nonstress Test Interpretation A: Reactive (24 1800 : Deborah Hodges, RN)  Comments A: Pt reports good fetal movement without cramping, ronny or leaking or bleeding. (24 1800 : Deborah Hodges, RN)

## 2024-10-02 LAB — GP B STREP DNA SPEC QL NAA+PROBE: NEGATIVE

## 2024-10-03 ENCOUNTER — HOSPITAL ENCOUNTER (INPATIENT)
Facility: HOSPITAL | Age: 44
LOS: 4 days | Discharge: HOME | End: 2024-10-07
Attending: MIDWIFE | Admitting: MIDWIFE
Payer: COMMERCIAL

## 2024-10-03 ENCOUNTER — HOSPITAL ENCOUNTER (OUTPATIENT)
Dept: LABOR AND DELIVERY | Facility: HOSPITAL | Age: 44
Discharge: HOME | End: 2024-10-03
Payer: COMMERCIAL

## 2024-10-03 ENCOUNTER — ANESTHESIA EVENT (OUTPATIENT)
Dept: PERIOP | Facility: HOSPITAL | Age: 44
End: 2024-10-03
Payer: COMMERCIAL

## 2024-10-03 ENCOUNTER — ROUTINE PRENATAL (OUTPATIENT)
Dept: OBSTETRICS AND GYNECOLOGY | Facility: CLINIC | Age: 44
End: 2024-10-03
Payer: COMMERCIAL

## 2024-10-03 ENCOUNTER — ANESTHESIA (OUTPATIENT)
Dept: PERIOP | Facility: HOSPITAL | Age: 44
End: 2024-10-03
Payer: COMMERCIAL

## 2024-10-03 VITALS — DIASTOLIC BLOOD PRESSURE: 90 MMHG | SYSTOLIC BLOOD PRESSURE: 140 MMHG | WEIGHT: 182.2 LBS | BODY MASS INDEX: 32.28 KG/M2

## 2024-10-03 DIAGNOSIS — O24.419 GESTATIONAL DIABETES MELLITUS, CLASS A2: ICD-10-CM

## 2024-10-03 DIAGNOSIS — O32.8XX1 MATERNAL CARE FOR OTHER MALPRESENTATION OF FETUS, FETUS 1: Primary | ICD-10-CM

## 2024-10-03 DIAGNOSIS — O09.523 MULTIGRAVIDA OF ADVANCED MATERNAL AGE IN THIRD TRIMESTER: Primary | ICD-10-CM

## 2024-10-03 PROBLEM — Z34.90 PREGNANCY: Status: ACTIVE | Noted: 2024-10-03

## 2024-10-03 LAB
ABO GROUP BLD: NORMAL
AMPHET+METHAMPHET UR QL: NEGATIVE
AMPHETAMINES UR QL: NEGATIVE
BARBITURATES UR QL SCN: NEGATIVE
BASOPHILS # BLD AUTO: 0.02 10*3/MM3 (ref 0–0.2)
BASOPHILS NFR BLD AUTO: 0.3 % (ref 0–1.5)
BENZODIAZ UR QL SCN: NEGATIVE
BILIRUB UR QL STRIP: NEGATIVE
BLD GP AB SCN SERPL QL: NEGATIVE
BUPRENORPHINE SERPL-MCNC: NEGATIVE NG/ML
CANNABINOIDS SERPL QL: NEGATIVE
CLARITY UR: CLEAR
COCAINE UR QL: NEGATIVE
COLOR UR: YELLOW
DEPRECATED RDW RBC AUTO: 41.7 FL (ref 37–54)
EOSINOPHIL # BLD AUTO: 0.12 10*3/MM3 (ref 0–0.4)
EOSINOPHIL NFR BLD AUTO: 1.7 % (ref 0.3–6.2)
ERYTHROCYTE [DISTWIDTH] IN BLOOD BY AUTOMATED COUNT: 15.2 % (ref 12.3–15.4)
FENTANYL UR-MCNC: NEGATIVE NG/ML
GLUCOSE BLDC GLUCOMTR-MCNC: 111 MG/DL (ref 70–130)
GLUCOSE BLDC GLUCOMTR-MCNC: 117 MG/DL (ref 70–130)
GLUCOSE UR STRIP-MCNC: NEGATIVE MG/DL
HCT VFR BLD AUTO: 31.2 % (ref 34–46.6)
HGB BLD-MCNC: 10.1 G/DL (ref 12–15.9)
HGB UR QL STRIP.AUTO: NEGATIVE
IMM GRANULOCYTES # BLD AUTO: 0.03 10*3/MM3 (ref 0–0.05)
IMM GRANULOCYTES NFR BLD AUTO: 0.4 % (ref 0–0.5)
KETONES UR QL STRIP: NEGATIVE
LEUKOCYTE ESTERASE UR QL STRIP.AUTO: NEGATIVE
LYMPHOCYTES # BLD AUTO: 1.43 10*3/MM3 (ref 0.7–3.1)
LYMPHOCYTES NFR BLD AUTO: 20.8 % (ref 19.6–45.3)
MCH RBC QN AUTO: 25.2 PG (ref 26.6–33)
MCHC RBC AUTO-ENTMCNC: 32.4 G/DL (ref 31.5–35.7)
MCV RBC AUTO: 77.8 FL (ref 79–97)
METHADONE UR QL SCN: NEGATIVE
MONOCYTES # BLD AUTO: 0.43 10*3/MM3 (ref 0.1–0.9)
MONOCYTES NFR BLD AUTO: 6.3 % (ref 5–12)
NEUTROPHILS NFR BLD AUTO: 4.83 10*3/MM3 (ref 1.7–7)
NEUTROPHILS NFR BLD AUTO: 70.5 % (ref 42.7–76)
NITRITE UR QL STRIP: NEGATIVE
NRBC BLD AUTO-RTO: 0.4 /100 WBC (ref 0–0.2)
OPIATES UR QL: NEGATIVE
OXYCODONE UR QL SCN: NEGATIVE
PCP UR QL SCN: NEGATIVE
PH UR STRIP.AUTO: 6 [PH] (ref 5–8)
PLATELET # BLD AUTO: 255 10*3/MM3 (ref 140–450)
PMV BLD AUTO: 9.8 FL (ref 6–12)
PROT UR QL STRIP: ABNORMAL
RBC # BLD AUTO: 4.01 10*6/MM3 (ref 3.77–5.28)
RH BLD: POSITIVE
SP GR UR STRIP: 1.02 (ref 1–1.03)
T&S EXPIRATION DATE: NORMAL
TREPONEMA PALLIDUM IGG+IGM AB [PRESENCE] IN SERUM OR PLASMA BY IMMUNOASSAY: NORMAL
TRICYCLICS UR QL SCN: NEGATIVE
UROBILINOGEN UR QL STRIP: ABNORMAL
WBC NRBC COR # BLD AUTO: 6.86 10*3/MM3 (ref 3.4–10.8)

## 2024-10-03 PROCEDURE — 86900 BLOOD TYPING SEROLOGIC ABO: CPT | Performed by: MIDWIFE

## 2024-10-03 PROCEDURE — 3E033VJ INTRODUCTION OF OTHER HORMONE INTO PERIPHERAL VEIN, PERCUTANEOUS APPROACH: ICD-10-PCS | Performed by: MIDWIFE

## 2024-10-03 PROCEDURE — 85025 COMPLETE CBC W/AUTO DIFF WBC: CPT | Performed by: MIDWIFE

## 2024-10-03 PROCEDURE — 86850 RBC ANTIBODY SCREEN: CPT | Performed by: MIDWIFE

## 2024-10-03 PROCEDURE — 87086 URINE CULTURE/COLONY COUNT: CPT | Performed by: MIDWIFE

## 2024-10-03 PROCEDURE — 86780 TREPONEMA PALLIDUM: CPT | Performed by: MIDWIFE

## 2024-10-03 PROCEDURE — 82948 REAGENT STRIP/BLOOD GLUCOSE: CPT

## 2024-10-03 PROCEDURE — 80307 DRUG TEST PRSMV CHEM ANLYZR: CPT | Performed by: MIDWIFE

## 2024-10-03 PROCEDURE — 86901 BLOOD TYPING SEROLOGIC RH(D): CPT | Performed by: MIDWIFE

## 2024-10-03 PROCEDURE — 81003 URINALYSIS AUTO W/O SCOPE: CPT | Performed by: MIDWIFE

## 2024-10-03 PROCEDURE — 99222 1ST HOSP IP/OBS MODERATE 55: CPT | Performed by: MIDWIFE

## 2024-10-03 PROCEDURE — 59025 FETAL NON-STRESS TEST: CPT

## 2024-10-03 RX ORDER — ONDANSETRON 4 MG/1
4 TABLET, ORALLY DISINTEGRATING ORAL EVERY 6 HOURS PRN
Status: DISCONTINUED | OUTPATIENT
Start: 2024-10-03 | End: 2024-10-04 | Stop reason: HOSPADM

## 2024-10-03 RX ORDER — SODIUM CHLORIDE 0.9 % (FLUSH) 0.9 %
10 SYRINGE (ML) INJECTION EVERY 12 HOURS SCHEDULED
Status: DISCONTINUED | OUTPATIENT
Start: 2024-10-03 | End: 2024-10-04 | Stop reason: HOSPADM

## 2024-10-03 RX ORDER — LAMOTRIGINE 100 MG/1
200 TABLET ORAL NIGHTLY
Status: DISCONTINUED | OUTPATIENT
Start: 2024-10-03 | End: 2024-10-04

## 2024-10-03 RX ORDER — MAGNESIUM CARB/ALUMINUM HYDROX 105-160MG
30 TABLET,CHEWABLE ORAL ONCE
Status: DISCONTINUED | OUTPATIENT
Start: 2024-10-03 | End: 2024-10-04

## 2024-10-03 RX ORDER — LIDOCAINE HYDROCHLORIDE 10 MG/ML
0.5 INJECTION, SOLUTION INFILTRATION; PERINEURAL ONCE AS NEEDED
Status: DISCONTINUED | OUTPATIENT
Start: 2024-10-03 | End: 2024-10-04 | Stop reason: HOSPADM

## 2024-10-03 RX ORDER — SODIUM CHLORIDE, SODIUM LACTATE, POTASSIUM CHLORIDE, CALCIUM CHLORIDE 600; 310; 30; 20 MG/100ML; MG/100ML; MG/100ML; MG/100ML
30 INJECTION, SOLUTION INTRAVENOUS CONTINUOUS
Status: DISCONTINUED | OUTPATIENT
Start: 2024-10-03 | End: 2024-10-04

## 2024-10-03 RX ORDER — SODIUM CHLORIDE 9 MG/ML
40 INJECTION, SOLUTION INTRAVENOUS AS NEEDED
Status: DISCONTINUED | OUTPATIENT
Start: 2024-10-03 | End: 2024-10-04 | Stop reason: HOSPADM

## 2024-10-03 RX ORDER — ACETAMINOPHEN 325 MG/1
650 TABLET ORAL EVERY 4 HOURS PRN
Status: DISCONTINUED | OUTPATIENT
Start: 2024-10-03 | End: 2024-10-04 | Stop reason: HOSPADM

## 2024-10-03 RX ORDER — PROMETHAZINE HYDROCHLORIDE 12.5 MG/1
12.5 SUPPOSITORY RECTAL EVERY 6 HOURS PRN
Status: DISCONTINUED | OUTPATIENT
Start: 2024-10-03 | End: 2024-10-04 | Stop reason: HOSPADM

## 2024-10-03 RX ORDER — HYDROXYZINE HYDROCHLORIDE 25 MG/1
50 TABLET, FILM COATED ORAL NIGHTLY PRN
Status: DISCONTINUED | OUTPATIENT
Start: 2024-10-03 | End: 2024-10-04 | Stop reason: HOSPADM

## 2024-10-03 RX ORDER — ONDANSETRON 2 MG/ML
4 INJECTION INTRAMUSCULAR; INTRAVENOUS ONCE AS NEEDED
Status: DISCONTINUED | OUTPATIENT
Start: 2024-10-03 | End: 2024-10-04 | Stop reason: HOSPADM

## 2024-10-03 RX ORDER — EPHEDRINE SULFATE 5 MG/ML
5 INJECTION INTRAVENOUS
Status: DISCONTINUED | OUTPATIENT
Start: 2024-10-03 | End: 2024-10-04 | Stop reason: HOSPADM

## 2024-10-03 RX ORDER — LAMOTRIGINE 100 MG/1
300 TABLET ORAL EVERY MORNING
Status: DISCONTINUED | OUTPATIENT
Start: 2024-10-04 | End: 2024-10-04

## 2024-10-03 RX ORDER — ONDANSETRON 2 MG/ML
4 INJECTION INTRAMUSCULAR; INTRAVENOUS EVERY 6 HOURS PRN
Status: DISCONTINUED | OUTPATIENT
Start: 2024-10-03 | End: 2024-10-04 | Stop reason: HOSPADM

## 2024-10-03 RX ORDER — OXYTOCIN/0.9 % SODIUM CHLORIDE 30/500 ML
2-20 PLASTIC BAG, INJECTION (ML) INTRAVENOUS
Status: DISCONTINUED | OUTPATIENT
Start: 2024-10-03 | End: 2024-10-04 | Stop reason: HOSPADM

## 2024-10-03 RX ORDER — PROMETHAZINE HYDROCHLORIDE 12.5 MG/1
12.5 TABLET ORAL EVERY 6 HOURS PRN
Status: DISCONTINUED | OUTPATIENT
Start: 2024-10-03 | End: 2024-10-04 | Stop reason: HOSPADM

## 2024-10-03 RX ORDER — MONTELUKAST SODIUM 10 MG/1
10 TABLET ORAL NIGHTLY
Status: DISCONTINUED | OUTPATIENT
Start: 2024-10-03 | End: 2024-10-04

## 2024-10-03 RX ORDER — SODIUM CHLORIDE 0.9 % (FLUSH) 0.9 %
10 SYRINGE (ML) INJECTION AS NEEDED
Status: DISCONTINUED | OUTPATIENT
Start: 2024-10-03 | End: 2024-10-04 | Stop reason: HOSPADM

## 2024-10-03 RX ADMIN — Medication 1 MILLI-UNITS/MIN: at 22:39

## 2024-10-03 RX ADMIN — LAMOTRIGINE 200 MG: 100 TABLET ORAL at 21:57

## 2024-10-03 RX ADMIN — HYDROXYZINE HYDROCHLORIDE 50 MG: 25 TABLET, FILM COATED ORAL at 21:57

## 2024-10-03 RX ADMIN — MONTELUKAST 10 MG: 10 TABLET, FILM COATED ORAL at 21:57

## 2024-10-03 NOTE — PROGRESS NOTES
Chief Complaint   Patient presents with    Routine Prenatal Visit     Prenatal visit with BPP done today. No problems or concerns        HPI:   , 36w6d gestation reports doing well    ROS:  See Prenatal Episode/Flowsheet  /90   Wt 82.6 kg (182 lb 3.2 oz)   LMP 2023   BMI 32.28 kg/m²      EXAM:  EXTREMITIES:  No swelling-See Prenatal Episode/Flowsheet    ABDOMEN:  FHTs/Movement noted-See Prenatal Episode/Flowsheet    URINE GLUCOSE/PROTEIN:  See Prenatal Episode/Flowsheet    PELVIC EXAM:  See Prenatal Episode/Flowsheet  CV:  Lungs:  GYN:    MDM:    Lab Results   Component Value Date    HGB 10.4 (L) 2024    RUBELLAABIGG 4.63 2024    HEPBSAG Negative 2024    ABO O 2024    RH Positive 2024    ABSCRN Negative 2024    LSS9WGP1 Non Reactive 2024    HEPCVIRUSABY Non Reactive 2024    SYE6YAVG 213 (H) 2024    STREPGPB Negative 2024    URINECX Final report 2024       U/S: BPP 8/8, JOSE 14, Vertex    1. IUP 36w6d  2. Routine care   3. Cervix 1+/50%/ soft    Indcue for CHTN and GDM and AMA

## 2024-10-03 NOTE — NON STRESS TEST
Marlin Silva, a  at 36w6d with an BILL of 10/25/2024, by Ultrasound, was seen at [facility] for a nonstress test.    Chief Complaint   Patient presents with    Scheduled Induction       Patient Active Problem List   Diagnosis    Non-recurrent unilateral inguinal hernia without obstruction or gangrene    Chest pain, atypical    Moderate persistent asthma without complication    Anxiety disorder    Fibromyalgia    AMA (advanced maternal age) multigravida 35+    Excessive fetal growth affecting management of mother in third trimester, antepartum    Gestational diabetes mellitus, class A2    Pregnancy       Start Time: 1730  Stop Time: 1800

## 2024-10-03 NOTE — ANESTHESIA PREPROCEDURE EVALUATION
Anesthesia Evaluation     Patient summary reviewed and Nursing notes reviewed   no history of anesthetic complications:   NPO Solid Status: > 8 hours  NPO Liquid Status: > 8 hours           Airway   Mallampati: II  TM distance: >3 FB  Neck ROM: full  Possible difficult intubation  Dental - normal exam     Pulmonary - normal exam   (+) asthma,  Cardiovascular - negative cardio ROS and normal exam        Neuro/Psych  (+) seizures well controlled, TIA, psychiatric history Anxiety and Depression  GI/Hepatic/Renal/Endo    (+) GERD, diabetes mellitus gestational    Musculoskeletal (-) negative ROS    Abdominal    Substance History - negative use     OB/GYN    (+) Pregnant        Other - negative ROS                     Anesthesia Plan    ASA 3 - emergent     epidural     intravenous induction     Anesthetic plan, risks, benefits, and alternatives have been provided, discussed and informed consent has been obtained with: patient.  Pre-procedure education provided  Plan discussed with CRNA.

## 2024-10-03 NOTE — H&P
"[facility]  Marlin Silva  : 1980  MRN: [id number]  CSN: [id number]    History and Physical    Chief Complaint   Patient presents with    Scheduled Induction      Subjective   Marlin Silva is a 44 y.o. year old  with an Estimated Date of Delivery: 10/25/24 currently 36w6d presenting for induction of labor for gestational hypertension, gestational diabetes, and AMA.     Risks of labor induction including prolongation of labor, increased risks for both  section and operative vaginal birth have been discussed at length.     Prenatal care has been with [facility].  It has been complicated by GDM - A2, gestational hypertension, anemia, and AMA (genetic screening was normal), also preexisting seizure disorder controlled. First PNV on 2024 @ 13 weeks with 13 visits. Weight gain = 14# lbs.     OB History    Para Term  AB Living   5 3 3 0 1 3   SAB IAB Ectopic Molar Multiple Live Births   0 0 0 0 0 3      # Outcome Date GA Lbr Mele/2nd Weight Sex Type Anes PTL Lv   5 Current            4 Term 11 39w0d  3232 g (7 lb 2 oz) M Vag-Spont  N MAXX   3 Term 05 38w0d  3714 g (8 lb 3 oz) M Vag-Spont   MAXX   2 Term 03 40w0d  3430 g (7 lb 9 oz) F Vag-Spont  N MAXX   1 AB              Past Medical History:   Diagnosis Date    Arthritis     Asthma     Depression     Dysphagia     REPORTS \"IT'S LIKE THINGS WON'T GO DOWN SOMETIMES\"    Fibromyalgia     GERD (gastroesophageal reflux disease)     PATIENT REPORTS HAD ER VISIT IN 2016 FOR RIGHT SIDED PAIN.  PATIENT THOUGHT MAY BE HEART RELATED BUT REPORTS WAS TOLD ONLY REFLUX.    History of bronchitis     History of kidney stones     REPORTS PASSED WITHOUT SURGICAL INTERVENTION IN SUMMER OF     History of panic attacks     History of pneumonia     Insomnia     MRSA (methicillin resistant Staphylococcus aureus)     REPORTS IN SINUS CAVITY AND THAT SHE WAS TREATED    Neck pain     Seizures     TIA " (transient ischemic attack)     REPORTS HX OF TIA 8 YEARS AGO AND THAT SHE WAS TAKEN OFF OF BCP.  NO RESIDUAL NOTED    Wears glasses     FOR READING     Past Surgical History:   Procedure Laterality Date    BREAST AUGMENTATION      COLONOSCOPY      DILATION AND CURETTAGE, DIAGNOSTIC / THERAPEUTIC      ENDOSCOPY      INGUINAL HERNIA REPAIR Right 11/20/2017    Procedure: REPAIR OF INCARCERATED RIGHT FEMORAL HERNIA, PRIMARY REPAIR OF BLADDER;  Surgeon: Jennifer Torrez MD;  Location: [facility];  Service:     SINUS SURGERY      WISDOM TOOTH EXTRACTION         Current Facility-Administered Medications:     acetaminophen (TYLENOL) tablet 650 mg, 650 mg, Oral, Q4H PRN, Velia Echeverria A, CNM    hydrOXYzine (ATARAX) tablet 50 mg, 50 mg, Oral, Nightly PRN, Mira Echeverriah A, CNM    lactated ringers bolus 1,000 mL, 1,000 mL, Intravenous, Once, Velia Echeverria A, CNM    lactated ringers infusion, 125 mL/hr, Intravenous, Continuous, Velia Echeverria A, ALLISON    lidocaine (XYLOCAINE) 1 % injection 0.5 mL, 0.5 mL, Intradermal, Once PRN, Velia Echeverria, JASVIRM    morphine injection 4 mg, 4 mg, Intravenous, Q4H PRN, Velia Echeverria A, CNM    morphine injection 6 mg, 6 mg, Intravenous, Q4H PRN, Velia Echeverria A, CNM    ondansetron ODT (ZOFRAN-ODT) disintegrating tablet 4 mg, 4 mg, Oral, Q6H PRN **OR** ondansetron (ZOFRAN) injection 4 mg, 4 mg, Intravenous, Q6H PRN, Velia Echeverria, JASVIRM    oxytocin (PITOCIN) 30 units in 0.9% sodium chloride 500 mL (premix), 2-20 reddy-units/min, Intravenous, Titrated, Velia Echeverria A, CNM    promethazine (PHENERGAN) suppository 12.5 mg, 12.5 mg, Rectal, Q6H PRN **OR** promethazine (PHENERGAN) tablet 12.5 mg, 12.5 mg, Oral, Q6H PRN, Velia Echeverria A, JASVIRM    sodium chloride 0.9 % flush 10 mL, 10 mL, Intravenous, Q12H, Velia Echeverria, CNM    sodium chloride 0.9 % flush 10 mL, 10 mL, Intravenous, PRN, Velia Echeverria CNM    sodium chloride 0.9 % infusion 40 mL, 40 mL, Intravenous, PRN, Foster,  "Velia GARRISON CNM    Allergies   Allergen Reactions    Bactrim [Sulfamethoxazole-Trimethoprim] Dizziness     Severe shaking      Sulfa Antibiotics Other (See Comments)     REPORTS CAUSED HAND TREMORS AND HEART PALPITATIONS       Social History    Tobacco Use      Smoking status: Never        Passive exposure: Never      Smokeless tobacco: Never    Review of Systems   Constitutional: Negative.    Respiratory: Negative.     Cardiovascular: Negative.    Gastrointestinal: Negative.    Genitourinary: Negative.    Musculoskeletal: Negative.    Neurological: Negative.    Psychiatric/Behavioral: Negative.     All other systems reviewed and are negative.        Objective   Pulse 69   Temp 98.1 °F (36.7 °C) (Oral)   Resp 16   Ht 160 cm (63\")   Wt 83.9 kg (185 lb)   LMP 07/19/2023   SpO2 98%   BMI 32.77 kg/m²                                           General:  well developed; well nourished  no acute distress  mentation appropriate   Neck:    Heart:   supple and no masses  normal apical impulse  regular rate and rhythm   Lungs: breathing is unlabored   Abdomen: Gravid and nontender  EFW: 7# anterior placenta US Ob Follow Up Transabdominal Approach (09/20/2024 11:51)    FHT's: reassuring, reactive, and category 1   Cervix: was checked (by Dr David): 1-2 cm / 50-60 % / -2 posterior   Presentation: cephalic   Contractions: none   Extremities:   normal appearance with no cyanosis or edema and no calf tenderness   Skin:  Skin color, texture, turgor normal. No rashes or lesions or Skin warm and dry   Psych:  Normal. and Alert and oriented, appropriate affect.       Prenatal Labs  Lab Results   Component Value Date    HGB 10.4 (L) 09/27/2024    HEPBSAG Negative 04/24/2024    ABSCRN Negative 04/24/2024    PIL4JAB0 Non Reactive 04/24/2024    HEPCVIRUSABY Non Reactive 04/24/2024    STREPGPB Negative 09/30/2024    URINECX Final report 07/24/2024       Current Labs Reviewed   Lab Results (last 24 hours)       ** No results found for " the last 24 hours. **               ASSESSMENT  IUP at 36w6d  Induction of labor because of gestational hypertension, gestational diabetes, and AMA  Group B strep status: negative  Reactive NST    PLAN  Admit to   Powell bulb inserted under sterile technique, bulb inflated with 40 ml sterile water and pulled snugly against cervix. Catheter secured to thigh. Tolerated well by mom and baby. Low dose Pitocin induction and increase per protocol @ 0500  All procedures explained to patient. Questions answered and verbalized understanding.  Anticipate     Velia Echeverria, APRN  10/3/2024  16:32 EDT

## 2024-10-04 PROBLEM — O32.8XX1 MATERNAL CARE FOR OTHER MALPRESENTATION OF FETUS, FETUS 1: Status: ACTIVE | Noted: 2024-09-30

## 2024-10-04 LAB
DEPRECATED RDW RBC AUTO: 42.1 FL (ref 37–54)
ERYTHROCYTE [DISTWIDTH] IN BLOOD BY AUTOMATED COUNT: 15.4 % (ref 12.3–15.4)
GLUCOSE BLDC GLUCOMTR-MCNC: 109 MG/DL (ref 70–130)
GLUCOSE BLDC GLUCOMTR-MCNC: 90 MG/DL (ref 70–130)
HCT VFR BLD AUTO: 27.4 % (ref 34–46.6)
HGB BLD-MCNC: 8.9 G/DL (ref 12–15.9)
MCH RBC QN AUTO: 25.2 PG (ref 26.6–33)
MCHC RBC AUTO-ENTMCNC: 32.5 G/DL (ref 31.5–35.7)
MCV RBC AUTO: 77.6 FL (ref 79–97)
PLATELET # BLD AUTO: 242 10*3/MM3 (ref 140–450)
PMV BLD AUTO: 9.9 FL (ref 6–12)
RBC # BLD AUTO: 3.53 10*6/MM3 (ref 3.77–5.28)
WBC NRBC COR # BLD AUTO: 9.56 10*3/MM3 (ref 3.4–10.8)

## 2024-10-04 PROCEDURE — C1755 CATHETER, INTRASPINAL: HCPCS | Performed by: NURSE ANESTHETIST, CERTIFIED REGISTERED

## 2024-10-04 PROCEDURE — 82948 REAGENT STRIP/BLOOD GLUCOSE: CPT | Performed by: MIDWIFE

## 2024-10-04 PROCEDURE — 25010000002 OXYTOCIN PER 10 UNITS: Performed by: NURSE ANESTHETIST, CERTIFIED REGISTERED

## 2024-10-04 PROCEDURE — 59510 CESAREAN DELIVERY: CPT | Performed by: OBSTETRICS & GYNECOLOGY

## 2024-10-04 PROCEDURE — 25010000002 LIDOCAINE PF 2% 2 % SOLUTION: Performed by: NURSE ANESTHETIST, CERTIFIED REGISTERED

## 2024-10-04 PROCEDURE — 25810000003 LACTATED RINGERS PER 1000 ML: Performed by: MIDWIFE

## 2024-10-04 PROCEDURE — 25010000002 CEFAZOLIN PER 500 MG: Performed by: MIDWIFE

## 2024-10-04 PROCEDURE — C1765 ADHESION BARRIER: HCPCS | Performed by: OBSTETRICS & GYNECOLOGY

## 2024-10-04 PROCEDURE — 25010000002 FENTANYL CITRATE (PF) 250 MCG/5ML SOLUTION 5 ML VIAL: Performed by: NURSE ANESTHETIST, CERTIFIED REGISTERED

## 2024-10-04 PROCEDURE — 85027 COMPLETE CBC AUTOMATED: CPT | Performed by: OBSTETRICS & GYNECOLOGY

## 2024-10-04 PROCEDURE — 25010000002 MIDAZOLAM PER 1MG: Performed by: NURSE ANESTHETIST, CERTIFIED REGISTERED

## 2024-10-04 PROCEDURE — 25010000002 KETOROLAC TROMETHAMINE PER 15 MG: Performed by: MIDWIFE

## 2024-10-04 PROCEDURE — 25010000002 ROPIVACAINE PER 1 MG: Performed by: NURSE ANESTHETIST, CERTIFIED REGISTERED

## 2024-10-04 PROCEDURE — 25010000002 MORPHINE PER 10 MG: Performed by: NURSE ANESTHETIST, CERTIFIED REGISTERED

## 2024-10-04 DEVICE — SEAL HEMO SURG ARISTA/AH ABS/PWDR 5GM: Type: IMPLANTABLE DEVICE | Site: ABDOMEN | Status: FUNCTIONAL

## 2024-10-04 DEVICE — HEMOST ABS SURGIFOAM SZ100 8X12 10MM: Type: IMPLANTABLE DEVICE | Site: ABDOMEN | Status: FUNCTIONAL

## 2024-10-04 DEVICE — ABSORBABLE ADHESION BARRIER
Type: IMPLANTABLE DEVICE | Site: ABDOMEN | Status: FUNCTIONAL
Brand: GYNECARE INTERCEED

## 2024-10-04 DEVICE — DEV CONTRL TISS STRATAFIX SPIRAL MNCRYL UD 3/0 PLS 60CM: Type: IMPLANTABLE DEVICE | Site: ABDOMEN | Status: FUNCTIONAL

## 2024-10-04 RX ORDER — HYDROCODONE BITARTRATE AND ACETAMINOPHEN 5; 325 MG/1; MG/1
1 TABLET ORAL EVERY 4 HOURS PRN
Status: DISCONTINUED | OUTPATIENT
Start: 2024-10-04 | End: 2024-10-07 | Stop reason: HOSPADM

## 2024-10-04 RX ORDER — PROMETHAZINE HYDROCHLORIDE 12.5 MG/1
12.5 TABLET ORAL EVERY 6 HOURS PRN
Status: DISCONTINUED | OUTPATIENT
Start: 2024-10-04 | End: 2024-10-07 | Stop reason: HOSPADM

## 2024-10-04 RX ORDER — ONDANSETRON 2 MG/ML
4 INJECTION INTRAMUSCULAR; INTRAVENOUS ONCE AS NEEDED
Status: DISCONTINUED | OUTPATIENT
Start: 2024-10-04 | End: 2024-10-07 | Stop reason: HOSPADM

## 2024-10-04 RX ORDER — DIPHENHYDRAMINE HCL 25 MG
25 CAPSULE ORAL EVERY 4 HOURS PRN
Status: DISCONTINUED | OUTPATIENT
Start: 2024-10-04 | End: 2024-10-07 | Stop reason: HOSPADM

## 2024-10-04 RX ORDER — KETOROLAC TROMETHAMINE 30 MG/ML
30 INJECTION, SOLUTION INTRAMUSCULAR; INTRAVENOUS ONCE
Status: COMPLETED | OUTPATIENT
Start: 2024-10-04 | End: 2024-10-04

## 2024-10-04 RX ORDER — SODIUM CHLORIDE, SODIUM LACTATE, POTASSIUM CHLORIDE, CALCIUM CHLORIDE 600; 310; 30; 20 MG/100ML; MG/100ML; MG/100ML; MG/100ML
125 INJECTION, SOLUTION INTRAVENOUS CONTINUOUS
Status: DISCONTINUED | OUTPATIENT
Start: 2024-10-04 | End: 2024-10-04

## 2024-10-04 RX ORDER — ALUMINA, MAGNESIA, AND SIMETHICONE 2400; 2400; 240 MG/30ML; MG/30ML; MG/30ML
15 SUSPENSION ORAL EVERY 4 HOURS PRN
Status: DISCONTINUED | OUTPATIENT
Start: 2024-10-04 | End: 2024-10-07 | Stop reason: HOSPADM

## 2024-10-04 RX ORDER — MORPHINE SULFATE 2 MG/ML
2 INJECTION, SOLUTION INTRAMUSCULAR; INTRAVENOUS ONCE AS NEEDED
Status: DISCONTINUED | OUTPATIENT
Start: 2024-10-04 | End: 2024-10-07 | Stop reason: HOSPADM

## 2024-10-04 RX ORDER — ONDANSETRON 4 MG/1
4 TABLET, ORALLY DISINTEGRATING ORAL EVERY 8 HOURS PRN
Status: DISCONTINUED | OUTPATIENT
Start: 2024-10-04 | End: 2024-10-07 | Stop reason: HOSPADM

## 2024-10-04 RX ORDER — FENTANYL/ROPIVACAINE/NS/PF 2MCG/ML-.2
PLASTIC BAG, INJECTION (ML) INJECTION
Status: DISPENSED
Start: 2024-10-04 | End: 2024-10-04

## 2024-10-04 RX ORDER — SIMETHICONE 80 MG
80 TABLET,CHEWABLE ORAL 4 TIMES DAILY PRN
Status: DISCONTINUED | OUTPATIENT
Start: 2024-10-04 | End: 2024-10-07 | Stop reason: HOSPADM

## 2024-10-04 RX ORDER — ACETAMINOPHEN 325 MG/1
650 TABLET ORAL EVERY 6 HOURS
Status: DISCONTINUED | OUTPATIENT
Start: 2024-10-05 | End: 2024-10-05

## 2024-10-04 RX ORDER — MIDAZOLAM HYDROCHLORIDE 2 MG/2ML
INJECTION, SOLUTION INTRAMUSCULAR; INTRAVENOUS AS NEEDED
Status: DISCONTINUED | OUTPATIENT
Start: 2024-10-04 | End: 2024-10-04 | Stop reason: SURG

## 2024-10-04 RX ORDER — ONDANSETRON 2 MG/ML
4 INJECTION INTRAMUSCULAR; INTRAVENOUS EVERY 6 HOURS PRN
Status: DISCONTINUED | OUTPATIENT
Start: 2024-10-04 | End: 2024-10-07 | Stop reason: HOSPADM

## 2024-10-04 RX ORDER — PROMETHAZINE HYDROCHLORIDE 12.5 MG/1
12.5 SUPPOSITORY RECTAL EVERY 6 HOURS PRN
Status: DISCONTINUED | OUTPATIENT
Start: 2024-10-04 | End: 2024-10-07 | Stop reason: HOSPADM

## 2024-10-04 RX ORDER — MISOPROSTOL 200 UG/1
800 TABLET ORAL AS NEEDED
Status: DISCONTINUED | OUTPATIENT
Start: 2024-10-04 | End: 2024-10-07 | Stop reason: HOSPADM

## 2024-10-04 RX ORDER — OXYCODONE HYDROCHLORIDE 5 MG/1
10 TABLET ORAL EVERY 4 HOURS PRN
Status: DISCONTINUED | OUTPATIENT
Start: 2024-10-04 | End: 2024-10-07 | Stop reason: HOSPADM

## 2024-10-04 RX ORDER — METHYLERGONOVINE MALEATE 0.2 MG/ML
200 INJECTION INTRAVENOUS ONCE AS NEEDED
Status: DISCONTINUED | OUTPATIENT
Start: 2024-10-04 | End: 2024-10-07 | Stop reason: HOSPADM

## 2024-10-04 RX ORDER — HYDROXYZINE HYDROCHLORIDE 25 MG/1
50 TABLET, FILM COATED ORAL NIGHTLY PRN
Status: DISCONTINUED | OUTPATIENT
Start: 2024-10-04 | End: 2024-10-07 | Stop reason: HOSPADM

## 2024-10-04 RX ORDER — CALCIUM CARBONATE 500 MG/1
1 TABLET, CHEWABLE ORAL EVERY 4 HOURS PRN
Status: DISCONTINUED | OUTPATIENT
Start: 2024-10-04 | End: 2024-10-07 | Stop reason: HOSPADM

## 2024-10-04 RX ORDER — OXYTOCIN/0.9 % SODIUM CHLORIDE 30/500 ML
125 PLASTIC BAG, INJECTION (ML) INTRAVENOUS ONCE AS NEEDED
Status: DISCONTINUED | OUTPATIENT
Start: 2024-10-04 | End: 2024-10-07 | Stop reason: HOSPADM

## 2024-10-04 RX ORDER — MORPHINE SULFATE 1 MG/ML
INJECTION, SOLUTION EPIDURAL; INTRATHECAL; INTRAVENOUS AS NEEDED
Status: DISCONTINUED | OUTPATIENT
Start: 2024-10-04 | End: 2024-10-04 | Stop reason: SURG

## 2024-10-04 RX ORDER — OXYCODONE HYDROCHLORIDE 5 MG/1
5 TABLET ORAL EVERY 4 HOURS PRN
Status: DISCONTINUED | OUTPATIENT
Start: 2024-10-04 | End: 2024-10-07 | Stop reason: HOSPADM

## 2024-10-04 RX ORDER — CARBOPROST TROMETHAMINE 250 UG/ML
250 INJECTION, SOLUTION INTRAMUSCULAR AS NEEDED
Status: DISCONTINUED | OUTPATIENT
Start: 2024-10-04 | End: 2024-10-07 | Stop reason: HOSPADM

## 2024-10-04 RX ORDER — ACETAMINOPHEN 325 MG/1
650 TABLET ORAL ONCE AS NEEDED
Status: COMPLETED | OUTPATIENT
Start: 2024-10-04 | End: 2024-10-05

## 2024-10-04 RX ORDER — OXYTOCIN/0.9 % SODIUM CHLORIDE 30/500 ML
250 PLASTIC BAG, INJECTION (ML) INTRAVENOUS CONTINUOUS
Status: ACTIVE | OUTPATIENT
Start: 2024-10-04 | End: 2024-10-04

## 2024-10-04 RX ORDER — CITRIC ACID/SODIUM CITRATE 334-500MG
30 SOLUTION, ORAL ORAL ONCE
Status: COMPLETED | OUTPATIENT
Start: 2024-10-04 | End: 2024-10-04

## 2024-10-04 RX ORDER — IBUPROFEN 600 MG/1
600 TABLET, FILM COATED ORAL EVERY 6 HOURS PRN
Status: DISCONTINUED | OUTPATIENT
Start: 2024-10-04 | End: 2024-10-05

## 2024-10-04 RX ORDER — METHYLERGONOVINE MALEATE 0.2 MG/ML
200 INJECTION INTRAVENOUS AS NEEDED
Status: DISCONTINUED | OUTPATIENT
Start: 2024-10-04 | End: 2024-10-07 | Stop reason: HOSPADM

## 2024-10-04 RX ORDER — METHYLERGONOVINE MALEATE 0.2 MG/ML
200 INJECTION INTRAVENOUS ONCE AS NEEDED
Status: DISCONTINUED | OUTPATIENT
Start: 2024-10-04 | End: 2024-10-04 | Stop reason: HOSPADM

## 2024-10-04 RX ORDER — ACETAMINOPHEN 500 MG
1000 TABLET ORAL ONCE
Status: COMPLETED | OUTPATIENT
Start: 2024-10-04 | End: 2024-10-04

## 2024-10-04 RX ORDER — FAMOTIDINE 10 MG/ML
20 INJECTION, SOLUTION INTRAVENOUS ONCE
Status: COMPLETED | OUTPATIENT
Start: 2024-10-04 | End: 2024-10-04

## 2024-10-04 RX ORDER — PRENATAL VIT/IRON FUM/FOLIC AC 27MG-0.8MG
1 TABLET ORAL DAILY
Status: DISCONTINUED | OUTPATIENT
Start: 2024-10-04 | End: 2024-10-07 | Stop reason: HOSPADM

## 2024-10-04 RX ORDER — ACETAMINOPHEN 500 MG
1000 TABLET ORAL EVERY 6 HOURS
Status: COMPLETED | OUTPATIENT
Start: 2024-10-04 | End: 2024-10-05

## 2024-10-04 RX ORDER — DOCUSATE SODIUM 100 MG/1
100 CAPSULE, LIQUID FILLED ORAL 2 TIMES DAILY PRN
Status: DISCONTINUED | OUTPATIENT
Start: 2024-10-04 | End: 2024-10-07 | Stop reason: HOSPADM

## 2024-10-04 RX ORDER — PROMETHAZINE HYDROCHLORIDE 25 MG/1
25 TABLET ORAL EVERY 6 HOURS PRN
Status: DISCONTINUED | OUTPATIENT
Start: 2024-10-04 | End: 2024-10-07 | Stop reason: HOSPADM

## 2024-10-04 RX ORDER — HYDROCORTISONE 25 MG/G
CREAM TOPICAL 3 TIMES DAILY PRN
Status: DISCONTINUED | OUTPATIENT
Start: 2024-10-04 | End: 2024-10-07 | Stop reason: HOSPADM

## 2024-10-04 RX ORDER — OXYTOCIN/0.9 % SODIUM CHLORIDE 30/500 ML
333 PLASTIC BAG, INJECTION (ML) INTRAVENOUS ONCE
Status: DISCONTINUED | OUTPATIENT
Start: 2024-10-04 | End: 2024-10-04 | Stop reason: HOSPADM

## 2024-10-04 RX ORDER — MISOPROSTOL 200 UG/1
800 TABLET ORAL AS NEEDED
Status: DISCONTINUED | OUTPATIENT
Start: 2024-10-04 | End: 2024-10-04 | Stop reason: HOSPADM

## 2024-10-04 RX ORDER — CARBOPROST TROMETHAMINE 250 UG/ML
250 INJECTION, SOLUTION INTRAMUSCULAR AS NEEDED
Status: DISCONTINUED | OUTPATIENT
Start: 2024-10-04 | End: 2024-10-04 | Stop reason: HOSPADM

## 2024-10-04 RX ORDER — ONDANSETRON 4 MG/1
4 TABLET, ORALLY DISINTEGRATING ORAL ONCE AS NEEDED
Status: DISCONTINUED | OUTPATIENT
Start: 2024-10-04 | End: 2024-10-07 | Stop reason: HOSPADM

## 2024-10-04 RX ORDER — KETAMINE HCL IN NACL, ISO-OSM 100MG/10ML
SYRINGE (ML) INJECTION AS NEEDED
Status: DISCONTINUED | OUTPATIENT
Start: 2024-10-04 | End: 2024-10-04 | Stop reason: SURG

## 2024-10-04 RX ORDER — OXYTOCIN 10 [USP'U]/ML
INJECTION, SOLUTION INTRAMUSCULAR; INTRAVENOUS AS NEEDED
Status: DISCONTINUED | OUTPATIENT
Start: 2024-10-04 | End: 2024-10-04 | Stop reason: SURG

## 2024-10-04 RX ORDER — OXYTOCIN/0.9 % SODIUM CHLORIDE 30/500 ML
999 PLASTIC BAG, INJECTION (ML) INTRAVENOUS ONCE
Status: DISCONTINUED | OUTPATIENT
Start: 2024-10-04 | End: 2024-10-07 | Stop reason: HOSPADM

## 2024-10-04 RX ORDER — OXYTOCIN/0.9 % SODIUM CHLORIDE 30/500 ML
42 PLASTIC BAG, INJECTION (ML) INTRAVENOUS AS NEEDED
Status: DISCONTINUED | OUTPATIENT
Start: 2024-10-04 | End: 2024-10-04 | Stop reason: HOSPADM

## 2024-10-04 RX ORDER — LIDOCAINE HYDROCHLORIDE 20 MG/ML
INJECTION, SOLUTION EPIDURAL; INFILTRATION; INTRACAUDAL; PERINEURAL AS NEEDED
Status: DISCONTINUED | OUTPATIENT
Start: 2024-10-04 | End: 2024-10-04 | Stop reason: SURG

## 2024-10-04 RX ADMIN — MORPHINE SULFATE 3 MG: 1 INJECTION, SOLUTION EPIDURAL; INTRATHECAL; INTRAVENOUS at 10:21

## 2024-10-04 RX ADMIN — KETOROLAC TROMETHAMINE 30 MG: 30 INJECTION, SOLUTION INTRAMUSCULAR; INTRAVENOUS at 12:00

## 2024-10-04 RX ADMIN — FAMOTIDINE 20 MG: 10 INJECTION INTRAVENOUS at 08:55

## 2024-10-04 RX ADMIN — OXYCODONE 10 MG: 5 TABLET ORAL at 23:08

## 2024-10-04 RX ADMIN — ACETAMINOPHEN 1000 MG: 500 TABLET, FILM COATED ORAL at 20:18

## 2024-10-04 RX ADMIN — LIDOCAINE HYDROCHLORIDE 5 ML: 20 INJECTION, SOLUTION EPIDURAL; INFILTRATION; INTRACAUDAL; PERINEURAL at 10:09

## 2024-10-04 RX ADMIN — SODIUM CHLORIDE, POTASSIUM CHLORIDE, SODIUM LACTATE AND CALCIUM CHLORIDE: 600; 310; 30; 20 INJECTION, SOLUTION INTRAVENOUS at 09:52

## 2024-10-04 RX ADMIN — SODIUM CHLORIDE 2 G: 9 INJECTION, SOLUTION INTRAVENOUS at 09:45

## 2024-10-04 RX ADMIN — ACETAMINOPHEN 1000 MG: 500 TABLET, FILM COATED ORAL at 15:02

## 2024-10-04 RX ADMIN — IBUPROFEN 600 MG: 600 TABLET ORAL at 18:09

## 2024-10-04 RX ADMIN — LIDOCAINE HYDROCHLORIDE 5 ML: 20 INJECTION, SOLUTION EPIDURAL; INFILTRATION; INTRACAUDAL; PERINEURAL at 10:05

## 2024-10-04 RX ADMIN — OXYCODONE 10 MG: 5 TABLET ORAL at 18:10

## 2024-10-04 RX ADMIN — Medication 25 MG: at 11:07

## 2024-10-04 RX ADMIN — MIDAZOLAM HYDROCHLORIDE 2 MG: 1 INJECTION, SOLUTION INTRAMUSCULAR; INTRAVENOUS at 10:58

## 2024-10-04 RX ADMIN — OXYTOCIN 20 UNITS: 10 INJECTION INTRAVENOUS at 10:21

## 2024-10-04 RX ADMIN — LIDOCAINE HYDROCHLORIDE 5 ML: 20 INJECTION, SOLUTION EPIDURAL; INFILTRATION; INTRACAUDAL; PERINEURAL at 10:07

## 2024-10-04 RX ADMIN — LAMOTRIGINE 300 MG: 100 TABLET ORAL at 09:15

## 2024-10-04 RX ADMIN — ACETAMINOPHEN 1000 MG: 500 TABLET, FILM COATED ORAL at 08:54

## 2024-10-04 RX ADMIN — Medication 25 MG: at 10:58

## 2024-10-04 RX ADMIN — SODIUM CHLORIDE, POTASSIUM CHLORIDE, SODIUM LACTATE AND CALCIUM CHLORIDE: 600; 310; 30; 20 INJECTION, SOLUTION INTRAVENOUS at 10:57

## 2024-10-04 RX ADMIN — SODIUM CITRATE AND CITRIC ACID MONOHYDRATE 30 ML: 500; 334 SOLUTION ORAL at 08:52

## 2024-10-04 RX ADMIN — HYDROXYZINE HYDROCHLORIDE 50 MG: 25 TABLET, FILM COATED ORAL at 23:12

## 2024-10-04 RX ADMIN — SODIUM CHLORIDE, POTASSIUM CHLORIDE, SODIUM LACTATE AND CALCIUM CHLORIDE: 600; 310; 30; 20 INJECTION, SOLUTION INTRAVENOUS at 09:45

## 2024-10-04 RX ADMIN — OXYCODONE 10 MG: 5 TABLET ORAL at 13:55

## 2024-10-04 RX ADMIN — OXYTOCIN 20 UNITS: 10 INJECTION INTRAVENOUS at 10:57

## 2024-10-04 RX ADMIN — ROPIVACAINE HYDROCHLORIDE 10 ML/HR: 2 INJECTION, SOLUTION EPIDURAL; INFILTRATION at 04:44

## 2024-10-04 NOTE — INTERVAL H&P NOTE
H&P updated. The patient was examined and there are no changes other than infant now in breech presentation.

## 2024-10-04 NOTE — PAYOR COMM NOTE
"TO:BC  FROM:ELI BRYANT, RN PHONE [phone]  CLINICALS REF# [id number]    Amanda Gandhi (44 y.o. Female)       Date of Birth   1980    Social Security Number       Address   [address] [facility] [address]    Home Phone   [phone]    MRN   [id number]       Denominational   None    Marital Status                               Admission Date   10/3/24    Admission Type   Elective    Admitting Provider   Velia Echeverria CNM    Attending Provider   Velia Echeverria CNM    Department, Room/Bed   [facility], L5/1       Discharge Date       Discharge Disposition       Discharge Destination                                 Attending Provider: Velia Echeverria CNM    Allergies: Bactrim [Sulfamethoxazole-trimethoprim], Sulfa Antibiotics    Isolation: None   Infection: None   Code Status: CPR    Ht: 160 cm (63\")   Wt: 83.9 kg (185 lb)    Admission Cmt: None   Principal Problem: Pregnancy [Z34.90]                   Active Insurance as of 10/3/2024       Primary Coverage       Payor Plan Insurance Group Employer/Plan Group    [facility] [employer] [id number]       Payor Plan Address Payor Plan Phone Number Payor Plan Fax Number Effective Dates    [address] [phone]  1/1/2018 - None Entered    [place] [address]         Subscriber Name Subscriber Birth Date Member ID       AMANDA GANDHI 1980 [id number]                     Emergency Contacts        (Rel.) Home Phone Work Phone Mobile Phone    MIRANDA HERNÁNDEZ (Father) [phone] -- --    RABIA RABAGOEW (Spouse) [phone] -- [phone]                 History & Physical        Hanh Mccarty MD at 10/04/24 0956          H&P updated. The patient was examined and there are no changes other than infant now in breech presentation.    Electronically signed by Hanh Mccarty MD at 10/04/24 0992   Source Note: H&P       Attestation signed by David, " "Gideon Arevalo MD at 10/03/24 1713    I have reviewed this documentation and agree.                  ORTIZ Rob Silva  : 1980  MRN: [id number]  CSN: [id number]    History and Physical    Chief Complaint   Patient presents with    Scheduled Induction      Subjective  Marlin Silva is a 44 y.o. year old  with an Estimated Date of Delivery: 10/25/24 currently 36w6d presenting for induction of labor for gestational hypertension, gestational diabetes, and AMA.     Risks of labor induction including prolongation of labor, increased risks for both  section and operative vaginal birth have been discussed at length.     Prenatal care has been with [facility].  It has been complicated by GDM - A2, gestational hypertension, anemia, and AMA (genetic screening was normal), also preexisting seizure disorder controlled. First PNV on 2024 @ 13 weeks with 13 visits. Weight gain = 14# lbs.     OB History    Para Term  AB Living   5 3 3 0 1 3   SAB IAB Ectopic Molar Multiple Live Births   0 0 0 0 0 3      # Outcome Date GA Lbr Mele/2nd Weight Sex Type Anes PTL Lv   5 Current            4 Term 11 39w0d  3232 g (7 lb 2 oz) M Vag-Spont  N MAXX   3 Term 05 38w0d  3714 g (8 lb 3 oz) M Vag-Spont   MAXX   2 Term 03 40w0d  3430 g (7 lb 9 oz) F Vag-Spont  N MAXX   1 AB              Past Medical History:   Diagnosis Date    Arthritis     Asthma     Depression     Dysphagia     REPORTS \"IT'S LIKE THINGS WON'T GO DOWN SOMETIMES\"    Fibromyalgia     GERD (gastroesophageal reflux disease)     PATIENT REPORTS HAD ER VISIT IN 2016 FOR RIGHT SIDED PAIN.  PATIENT THOUGHT MAY BE HEART RELATED BUT REPORTS WAS TOLD ONLY REFLUX.    History of bronchitis     History of kidney stones     REPORTS PASSED WITHOUT SURGICAL INTERVENTION IN SUMMER OF     History of panic attacks     History of pneumonia     Insomnia     MRSA (methicillin resistant " Staphylococcus aureus) 2008    REPORTS IN SINUS CAVITY AND THAT SHE WAS TREATED    Neck pain     Seizures     TIA (transient ischemic attack)     REPORTS HX OF TIA 8 YEARS AGO AND THAT SHE WAS TAKEN OFF OF BCP.  NO RESIDUAL NOTED    Wears glasses     FOR READING     Past Surgical History:   Procedure Laterality Date    BREAST AUGMENTATION      COLONOSCOPY      DILATION AND CURETTAGE, DIAGNOSTIC / THERAPEUTIC      ENDOSCOPY      INGUINAL HERNIA REPAIR Right 11/20/2017    Procedure: REPAIR OF INCARCERATED RIGHT FEMORAL HERNIA, PRIMARY REPAIR OF BLADDER;  Surgeon: Jennifer Torrez MD;  Location: [facility];  Service:     SINUS SURGERY      WISDOM TOOTH EXTRACTION         Current Facility-Administered Medications:     acetaminophen (TYLENOL) tablet 650 mg, 650 mg, Oral, Q4H PRN, Velia Echeverria, ALLISON    hydrOXYzine (ATARAX) tablet 50 mg, 50 mg, Oral, Nightly PRN, Velia Echeverria, ALLISON    lactated ringers bolus 1,000 mL, 1,000 mL, Intravenous, Once, Velia Echeverria CNM    lactated ringers infusion, 125 mL/hr, Intravenous, Continuous, Velia Echeverria CNM    lidocaine (XYLOCAINE) 1 % injection 0.5 mL, 0.5 mL, Intradermal, Once PRN, Velia Echeverria, ALLISON    morphine injection 4 mg, 4 mg, Intravenous, Q4H PRN, Velia Echeverria, ALLISON    morphine injection 6 mg, 6 mg, Intravenous, Q4H PRN, Velia Echeverria, ALLISON    ondansetron ODT (ZOFRAN-ODT) disintegrating tablet 4 mg, 4 mg, Oral, Q6H PRN **OR** ondansetron (ZOFRAN) injection 4 mg, 4 mg, Intravenous, Q6H PRN, Velia Echeverria CNM    oxytocin (PITOCIN) 30 units in 0.9% sodium chloride 500 mL (premix), 2-20 reddy-units/min, Intravenous, Titrated, Velia cEheverria CNM    promethazine (PHENERGAN) suppository 12.5 mg, 12.5 mg, Rectal, Q6H PRN **OR** promethazine (PHENERGAN) tablet 12.5 mg, 12.5 mg, Oral, Q6H PRN, Velia Echeverria, ALLISON    sodium chloride 0.9 % flush 10 mL, 10 mL, Intravenous, Q12H, Velia Echeverria CNM    sodium chloride 0.9 % flush 10 mL, 10 mL,  "Intravenous, PRN, Mira Echeverriah A, CNM    sodium chloride 0.9 % infusion 40 mL, 40 mL, Intravenous, PRN, Foster, Velia A, CNM    Allergies   Allergen Reactions    Bactrim [Sulfamethoxazole-Trimethoprim] Dizziness     Severe shaking      Sulfa Antibiotics Other (See Comments)     REPORTS CAUSED HAND TREMORS AND HEART PALPITATIONS       Social History    Tobacco Use      Smoking status: Never        Passive exposure: Never      Smokeless tobacco: Never    Review of Systems   Constitutional: Negative.    Respiratory: Negative.     Cardiovascular: Negative.    Gastrointestinal: Negative.    Genitourinary: Negative.    Musculoskeletal: Negative.    Neurological: Negative.    Psychiatric/Behavioral: Negative.     All other systems reviewed and are negative.        Objective  Pulse 69   Temp 98.1 °F (36.7 °C) (Oral)   Resp 16   Ht 160 cm (63\")   Wt 83.9 kg (185 lb)   LMP 07/19/2023   SpO2 98%   BMI 32.77 kg/m²                                           General:  well developed; well nourished  no acute distress  mentation appropriate   Neck:    Heart:   supple and no masses  normal apical impulse  regular rate and rhythm   Lungs: breathing is unlabored   Abdomen: Gravid and nontender  EFW: 7# anterior placenta US Ob Follow Up Transabdominal Approach (09/20/2024 11:51)    FHT's: reassuring, reactive, and category 1   Cervix: was checked (by Dr David): 1-2 cm / 50-60 % / -2 posterior   Presentation: cephalic   Contractions: none   Extremities:   normal appearance with no cyanosis or edema and no calf tenderness   Skin:  Skin color, texture, turgor normal. No rashes or lesions or Skin warm and dry   Psych:  Normal. and Alert and oriented, appropriate affect.       Prenatal Labs  Lab Results   Component Value Date    HGB 10.4 (L) 09/27/2024    HEPBSAG Negative 04/24/2024    ABSCRN Negative 04/24/2024    [id number] Non Reactive 04/24/2024    HEPCVIRUSABY Non Reactive 04/24/2024    STREPGPB Negative 09/30/2024    " URINECX Final report 2024       Current Labs Reviewed   Lab Results (last 24 hours)       ** No results found for the last 24 hours. **               ASSESSMENT  IUP at 36w6d  Induction of labor because of gestational hypertension, gestational diabetes, and AMA  Group B strep status: negative  Reactive NST    PLAN  Admit to   Powell bulb inserted under sterile technique, bulb inflated with 40 ml sterile water and pulled snugly against cervix. Catheter secured to thigh. Tolerated well by mom and baby. Low dose Pitocin induction and increase per protocol @ 0500  All procedures explained to patient. Questions answered and verbalized understanding.  Anticipate     LUISA Koenig  10/3/2024  16:32 EDT         Electronically signed by Gideon David MD at 10/03/24 1713                 Velia Echeverria CNM at 10/03/24 1632       Attestation signed by Gideon David MD at 10/03/24 1713    I have reviewed this documentation and agree.                  [facility]  Marlin Silva  : 1980  MRN: [id number]  CSN: [id number]    History and Physical    Chief Complaint   Patient presents with    Scheduled Induction      Subjective  Marlin Silva is a 44 y.o. year old  with an Estimated Date of Delivery: 10/25/24 currently 36w6d presenting for induction of labor for gestational hypertension, gestational diabetes, and AMA.     Risks of labor induction including prolongation of labor, increased risks for both  section and operative vaginal birth have been discussed at length.     Prenatal care has been with [facility].  It has been complicated by GDM - A2, gestational hypertension, anemia, and AMA (genetic screening was normal), also preexisting seizure disorder controlled. First PNV on 2024 @ 13 weeks with 13 visits. Weight gain = 14# lbs.     OB History    Para Term  AB Living   5 3 3 0 1 3   SAB IAB Ectopic Molar Multiple Live  "Births   0 0 0 0 0 3      # Outcome Date GA Lbr Mele/2nd Weight Sex Type Anes PTL Lv   5 Current            4 Term 12/29/11 39w0d  3232 g (7 lb 2 oz) M Vag-Spont  N MAXX   3 Term 12/13/05 38w0d  3714 g (8 lb 3 oz) M Vag-Spont   MAXX   2 Term 09/21/03 40w0d  3430 g (7 lb 9 oz) F Vag-Spont  N MAXX   1 AB              Past Medical History:   Diagnosis Date    Arthritis     Asthma     Depression     Dysphagia     REPORTS \"IT'S LIKE THINGS WON'T GO DOWN SOMETIMES\"    Fibromyalgia     GERD (gastroesophageal reflux disease)     PATIENT REPORTS HAD ER VISIT IN DECEMBER OF 2016 FOR RIGHT SIDED PAIN.  PATIENT THOUGHT MAY BE HEART RELATED BUT REPORTS WAS TOLD ONLY REFLUX.    History of bronchitis     History of kidney stones     REPORTS PASSED WITHOUT SURGICAL INTERVENTION IN SUMMER OF 2017    History of panic attacks     History of pneumonia     Insomnia     MRSA (methicillin resistant Staphylococcus aureus) 2008    REPORTS IN SINUS CAVITY AND THAT SHE WAS TREATED    Neck pain     Seizures     TIA (transient ischemic attack)     REPORTS HX OF TIA 8 YEARS AGO AND THAT SHE WAS TAKEN OFF OF BCP.  NO RESIDUAL NOTED    Wears glasses     FOR READING     Past Surgical History:   Procedure Laterality Date    BREAST AUGMENTATION      COLONOSCOPY      DILATION AND CURETTAGE, DIAGNOSTIC / THERAPEUTIC      ENDOSCOPY      INGUINAL HERNIA REPAIR Right 11/20/2017    Procedure: REPAIR OF INCARCERATED RIGHT FEMORAL HERNIA, PRIMARY REPAIR OF BLADDER;  Surgeon: Jennifer Torrez MD;  Location: [facility];  Service:     SINUS SURGERY      WISDOM TOOTH EXTRACTION         Current Facility-Administered Medications:     acetaminophen (TYLENOL) tablet 650 mg, 650 mg, Oral, Q4H PRN, Juwan Velia A, CNM    hydrOXYzine (ATARAX) tablet 50 mg, 50 mg, Oral, Nightly PRN, Juwan Velia A, CNM    lactated ringers bolus 1,000 mL, 1,000 mL, Intravenous, Once, Velia Echeverria, CNKRYSTA    lactated ringers infusion, 125 mL/hr, Intravenous, Continuous, Velia Echeverria " "A, CNM    lidocaine (XYLOCAINE) 1 % injection 0.5 mL, 0.5 mL, Intradermal, Once PRN, Velia Echeverria A, CNM    morphine injection 4 mg, 4 mg, Intravenous, Q4H PRN, Juwan, Velia A, CNM    morphine injection 6 mg, 6 mg, Intravenous, Q4H PRN, Foster, Velia A, CNM    ondansetron ODT (ZOFRAN-ODT) disintegrating tablet 4 mg, 4 mg, Oral, Q6H PRN **OR** ondansetron (ZOFRAN) injection 4 mg, 4 mg, Intravenous, Q6H PRN, Rama Echeverriaorah A, CNM    oxytocin (PITOCIN) 30 units in 0.9% sodium chloride 500 mL (premix), 2-20 reddy-units/min, Intravenous, Titrated, Juwan, Velia A, CNM    promethazine (PHENERGAN) suppository 12.5 mg, 12.5 mg, Rectal, Q6H PRN **OR** promethazine (PHENERGAN) tablet 12.5 mg, 12.5 mg, Oral, Q6H PRN, Juwan, Velia A, CNM    sodium chloride 0.9 % flush 10 mL, 10 mL, Intravenous, Q12H, Juwan, Velia A, CNM    sodium chloride 0.9 % flush 10 mL, 10 mL, Intravenous, PRN, Juwan, Velia A, CNM    sodium chloride 0.9 % infusion 40 mL, 40 mL, Intravenous, PRN, Juwan, Velia A, CNM    Allergies   Allergen Reactions    Bactrim [Sulfamethoxazole-Trimethoprim] Dizziness     Severe shaking      Sulfa Antibiotics Other (See Comments)     REPORTS CAUSED HAND TREMORS AND HEART PALPITATIONS       Social History    Tobacco Use      Smoking status: Never        Passive exposure: Never      Smokeless tobacco: Never    Review of Systems   Constitutional: Negative.    Respiratory: Negative.     Cardiovascular: Negative.    Gastrointestinal: Negative.    Genitourinary: Negative.    Musculoskeletal: Negative.    Neurological: Negative.    Psychiatric/Behavioral: Negative.     All other systems reviewed and are negative.        Objective  Pulse 69   Temp 98.1 °F (36.7 °C) (Oral)   Resp 16   Ht 160 cm (63\")   Wt 83.9 kg (185 lb)   LMP 07/19/2023   SpO2 98%   BMI 32.77 kg/m²                                           General:  well developed; well nourished  no acute distress  mentation appropriate   Neck:  "   Heart:   supple and no masses  normal apical impulse  regular rate and rhythm   Lungs: breathing is unlabored   Abdomen: Gravid and nontender  EFW: 7# anterior placenta US Ob Follow Up Transabdominal Approach (2024 11:51)    FHT's: reassuring, reactive, and category 1   Cervix: was checked (by Dr David): 1-2 cm / 50-60 % / -2 posterior   Presentation: cephalic   Contractions: none   Extremities:   normal appearance with no cyanosis or edema and no calf tenderness   Skin:  Skin color, texture, turgor normal. No rashes or lesions or Skin warm and dry   Psych:  Normal. and Alert and oriented, appropriate affect.       Prenatal Labs  Lab Results   Component Value Date    HGB 10.4 (L) 2024    HEPBSAG Negative 2024    ABSCRN Negative 2024    TST3ABA9 Non Reactive 2024    HEPCVIRUSABY Non Reactive 2024    STREPGPB Negative 2024    URINECX Final report 2024       Current Labs Reviewed   Lab Results (last 24 hours)       ** No results found for the last 24 hours. **               ASSESSMENT  IUP at 36w6d  Induction of labor because of gestational hypertension, gestational diabetes, and AMA  Group B strep status: negative  Reactive NST    PLAN  Admit to   Powell bulb inserted under sterile technique, bulb inflated with 40 ml sterile water and pulled snugly against cervix. Catheter secured to thigh. Tolerated well by mom and baby. Low dose Pitocin induction and increase per protocol @ 0500  All procedures explained to patient. Questions answered and verbalized understanding.  Anticipate     Velia Echeverria, APRN  10/3/2024  16:32 EDT         Electronically signed by Gideon David MD at 10/03/24 4150       Vital Signs (last day)       Date/Time Temp Temp src Pulse Resp BP Patient Position SpO2    10/04/24 0926 -- -- 79 -- -- -- 95    10/04/24 0921 -- -- 71 -- -- -- 95    10/04/24 0917 -- -- 73 -- 142/72 -- --    10/04/24 0916 -- -- 73 -- -- -- 94    10/04/24  0911 -- -- 75 -- -- -- 94    10/04/24 0906 -- -- 74 -- -- -- 95    10/04/24 0902 -- -- 78 -- 156/83 -- --    10/04/24 0901 -- -- 86 -- -- -- 95    10/04/24 0856 -- -- 80 -- -- -- 94    10/04/24 0855 -- -- 80 -- -- -- 94    10/04/24 0851 -- -- 75 -- -- -- 95    10/04/24 0847 -- -- 80 -- 141/67 -- --    10/04/24 0846 -- -- 82 -- -- -- 95    10/04/24 0841 -- -- 82 -- -- -- 94    10/04/24 0836 -- -- 81 -- -- -- 94    10/04/24 0832 -- -- 77 -- 163/79 -- --    10/04/24 0831 -- -- 72 -- -- -- 95    10/04/24 0826 -- -- 81 -- -- -- 94    10/04/24 0825 -- -- 81 -- -- -- 94    10/04/24 0821 -- -- 84 -- -- -- 95    10/04/24 0817 -- -- 77 -- 127/57 -- --    10/04/24 0816 -- -- 76 -- -- -- 94    10/04/24 0811 -- -- 79 -- -- -- 95    10/04/24 0806 -- -- 73 -- -- -- 94    10/04/24 0802 -- -- 67 -- 151/78 -- --    10/04/24 0801 -- -- 68 -- -- -- 94    10/04/24 0800 -- -- 68 -- -- -- 94    10/04/24 0756 -- -- 77 -- -- -- 94    10/04/24 0755 98.2 (36.8) Oral -- 16 -- -- --    10/04/24 0751 -- -- 71 -- -- -- 94    10/04/24 0747 -- -- 84 -- 154/75 -- --    10/04/24 0746 -- -- 80 -- -- -- 95    10/04/24 0741 -- -- 67 -- -- -- 95    10/04/24 0736 -- -- 70 -- -- -- 95    10/04/24 0735 -- -- 70 -- -- -- 95    10/04/24 0732 -- -- 70 -- 132/81 -- --    10/04/24 0731 -- -- 73 -- -- -- 98    10/04/24 0726 -- -- 73 -- -- -- 94    10/04/24 0721 -- -- 71 -- -- -- 95    10/04/24 0717 -- -- 69 -- -- -- --    10/04/24 0715 -- -- 69 -- 149/79 -- 94    10/04/24 0711 -- -- 68 -- -- -- 94    10/04/24 0706 -- -- 67 -- -- -- 94    10/04/24 0705 -- -- 67 -- -- -- 94    10/04/24 0702 -- -- 67 -- -- -- --    10/04/24 0700 -- -- 75 -- 152/76 -- 94    10/04/24 0645 -- -- 73 -- 143/70 -- 94    10/04/24 0630 -- -- 66 -- 135/67 -- 94    10/04/24 0615 -- -- 68 -- 129/62 -- 95    10/04/24 0600 -- -- 62 -- 148/72 -- 94    10/04/24 0545 -- -- 63 -- 132/71 -- 95    10/04/24 0530 -- -- 83 -- 145/65 -- 94    10/04/24 0515 -- -- 72 -- 145/67 -- 94    10/04/24 0500 -- --  66 -- 126/67 -- 95    10/04/24 0445 -- -- 61 -- 137/60 -- 94    10/04/24 0432 -- -- 64 -- 111/53 Lying 95    10/04/24 0422 98.1 (36.7) Oral 64 15 121/52 Sitting 96    10/04/24 0420 -- -- 70 -- 115/57 Lying --    10/04/24 0417 -- -- 66 -- 130/52 Lying --    10/04/24 0414 -- -- 67 -- 133/48 Lying --    10/04/24 0411 -- -- 74 -- 145/70 Lying 95    10/04/24 0408 -- -- 65 -- 161/70 -- --    10/04/24 0405 -- -- 77 -- 161/83 Lying --    10/04/24 0400 -- -- 72 -- 166/73 Lying 96    10/04/24 0347 -- -- 67 -- 169/88 Lying --    10/04/24 0330 -- -- 68 -- 101/81 -- 95    10/04/24 0300 -- -- 69 -- 131/64 -- 95    10/04/24 0230 -- -- 72 -- 138/73 Lying 95    10/04/24 0200 -- -- 69 -- 134/77 Lying 95    10/04/24 0130 -- -- 70 -- 139/71 Lying 95    10/04/24 0100 -- -- 67 -- 151/73 -- 95    10/04/24 0030 -- -- 61 -- 140/62 -- 95    10/04/24 0000 98 (36.7) Oral 63 16 138/71 Lying 94    10/03/24 2330 -- -- 67 -- 134/56 Lying 95    10/03/24 2300 -- -- 75 -- 150/78 Lying 93    10/03/24 2230 -- -- 73 -- 149/80 Lying 95    10/03/24 2200 -- -- 74 -- 131/66 Lying 96    10/03/24 2130 -- -- 69 -- 137/63 Lying 96    10/03/24 2030 -- -- 69 -- 144/66 Lying 93    10/03/24 2000 -- -- 67 -- 144/72 Lying 94    10/03/24 1930 98.1 (36.7) Oral 71 -- 143/71 Lying 95    10/03/24 1900 -- -- 77 -- 168/83 Lying 97    10/03/24 1830 -- -- 67 -- 125/62 -- 95    10/03/24 1800 -- -- 67 -- 125/61 -- 97    10/03/24 1730 -- -- 71 -- 130/57 -- 96    10/03/24 1703 -- -- 69 -- 131/65 -- --    10/03/24 1645 -- -- 75 -- 140/59 -- 98    10/03/24 1630 -- -- 77 -- 145/77 -- 98    10/03/24 1618 98.1 (36.7) Oral 69 16 140/72 Lying 98             Operative/Procedure Notes (last 24 hours)        Hanh Mccarty MD at 10/04/24 1013           [facility] 1980  MRN: [id number]  CSN: [id number]    Operative Report    Pre-op Diagnosis:  IUP at 37w0d weeks  Malpresentation, breech  Gestational hypertension  Gestational diabetes  AMA     Post-op  Diagnosis:  Same as above     Procedure: Procedure(s):   SECTION PRIMARY     Surgeon: Hanh Mccarty M.D.     Assist: Staff was responsible for performing the following activities: Retraction, Suction, Irrigation, and Placing Dressing and their skilled assistance was necessary for the success of this case.     OR Staff: Circulator: Shanon Max RN; Aura Hermosillo RN  Scrub Person: Marilynn Abernathy; Areli Gunn; Silva Cortez  Other: Candice Krishna RN     Anesthesia: Spinal     Estimated Blood Loss: 1700 mls     Drains: Nguyễn      ABx: cefazolin 2 gms     Specimens:  none     Findings: Viable male infant with left footling presentation and right hip sravani breech.  Arcuate shape uterus with ?cornual fibroid vs  prominent horn.  Adnexa grossly normal in appearance.     Complications: none       Procedure Details:   After the appropriate time out, the patient was prepped and draped in the usual sterile fashion.  She had been placed in the dorsal supine left lateral tilt position.  A nguyễn catheter had been placed in the bladder for drainage during the procedure. A pfannenstiel skin incision was made with a knife and carried down to the fascia.  The fascia was nicked with a scalpel and the incision was extended bilaterally with curved Parson scissors. The superior aspect of the fascia was grasped with Kocher clamps x 2 and bluntly as well as sharply dissected away from the underlying rectus muscles.  A similar process was repeated inferiorly. The rectus muscles were  and the peritoneal cavity was entered sharply without complications. The bladder flap was created with Metzenbaum scissors and pickups with teeth.  The  retractor was placed and after checking for no entrapment, was retracted down.  A transverse uterine incision was made with the knife and extended bilaterally.  The infant was delivered from the breech presentation with flexion of the head upon delivery.  The mouth and nose were  bulb suctioned.  The cord was doubly clamped and cut and the infant handed to the pediatric nurse in attendance.  Cord blood was obtained.  The placenta was manually extracted from the uterus.  The uterus was then elevated from the abdomen and wiped free of remaining membranes.  There was noted to be the prominent left cornu versus left cornual fibroid.  The uterine incision was closed with #1 chromic in a running locking fashion with a second imbricating stitch.  There was noted to be a small subserosal tear over the left cornu.  Multiple figure-of-eight sutures using 3-0 and 2-0 chromic and Vicryl 4 placed in a figure-of-eight fashion for adequate hemostasis.  The uterus had been returned to the abdomen.  The lateral gutters were wiped free of remaining clots.  The site of surgical incision was inspected and noted to be hemostatic.  The  retractor was removed.  Skyler have been placed over the subserosal area followed by Interceed.  Adequate hemostasis was noted.  Gelfoam was placed over the hysterotomy site as well to ensure additional hemostasis.  The subfascial tissue was inspected and noted to be hemostatic.  The fascia was closed with 0 PDS in a running non-locking fashion.  Subcutaneous tissue was inspected and noted to be hemostatic with minimal bovie electrocautery.  Nargis's fascia was closed with 3-0 chromic in a running non-locking fashion.  The skin was approximated with 4-0 STRATAFIX in a subcuticular fashion.  Skin glue was applied as well as Steri-Strips.  Dressings were placed.  All instrument and sponge counts were correct at the end of the procedure. The patient tolerated the procedure well.  There were no complications.  She was taken to postoperative recovery room in stable condition.      Complications:   None      Disposition:   Mother to Mother Baby/Postpartum  in stable condition currently.   Baby to NBN  in stable condition currently.     Hanh Mccarty M.D.  10/4/2024  11:50  EDT    Electronically signed by Hanh Mccarty MD at 10/04/24 1156       Physician Progress Notes (last 48 hours)  Notes from 10/02/24 1215 through 10/04/24 1215   No notes of this type exist for this encounter.

## 2024-10-04 NOTE — PROGRESS NOTES
Rob Harris [facility]  : 1980  MRN: [id number]  CSN: [id number]    Labor progress note    Subjective   Powell bulb came out last pm. She reports comfortable with epidural     Objective   Min/max vitals past 24 hours:  Temp  Min: 98 °F (36.7 °C)  Max: 98.1 °F (36.7 °C)   BP  Min: 101/81  Max: 169/88   Pulse  Min: 61  Max: 83   Resp  Min: 15  Max: 16        FHT's: reassuring, reactive, and category 1   Cervix: was checked (by me): 3-4 cm / 70 % / Floating  Bedside US performed - baby breech   Contractions: regular every 2-3 minutes - external monitors used Pitocin @ 2 mu      Assessment   IUP at 37w0d  Fetal status reassuring  Breech presentation     Plan   Consulted with Dr Mccarty, she will confirm position  Prepare for CSection    Velia Echeverria, APRN  10/4/2024  07:59 EDT

## 2024-10-04 NOTE — ANESTHESIA POSTPROCEDURE EVALUATION
Patient: Marlin Silva    Procedure Summary       Date: 10/04/24 Room / Location: [facility] OR  /  NEW OR    Anesthesia Start: 443 Anesthesia Stop:     Procedure:  SECTION PRIMARY (Abdomen) Diagnosis:       Maternal care for other malpresentation of fetus, fetus 1      (Maternal care for other malpresentation of fetus, fetus 1 [O32.8XX1])    Surgeons: Hanh Mccarty MD Provider: Mickey Corado CRNA    Anesthesia Type: epidural ASA Status: 3 - Emergent            Anesthesia Type: epidural    Vitals  Vitals Value Taken Time   /71 10/04/24 1219   Temp 98.2 °F (36.8 °C) 10/04/24 0755   Pulse 79 10/04/24 1219   Resp 16 10/04/24 0755   SpO2 93 % 10/04/24 1216   Vitals shown include unfiled device data.        Post Anesthesia Care and Evaluation    Patient location during evaluation: bedside  Patient participation: complete - patient participated  Level of consciousness: awake and alert  Pain score: 0  Pain management: adequate    Airway patency: patent  Anesthetic complications: No anesthetic complications  PONV Status: none  Cardiovascular status: acceptable  Respiratory status: acceptable  Hydration status: acceptable

## 2024-10-04 NOTE — PLAN OF CARE
Goal Outcome Evaluation:         Primary  section viable male infant. Provider reviewed vitals, fundal and lochia wnl. Stable to transfer to postpartum. Report given to GRZEGORZ Yeung.                                     Problem: Bleeding (Labor)  Goal: Hemostasis  Outcome: Unable to Meet     Problem: Change in Fetal Wellbeing (Labor)  Goal: Stable Fetal Wellbeing  Outcome: Unable to Meet     Problem: Delayed Labor Progression (Labor)  Goal: Effective Progression to Delivery  Outcome: Unable to Meet     Problem: Infection (Labor)  Goal: Absence of Infection Signs and Symptoms  Outcome: Unable to Meet     Problem: Labor Pain (Labor)  Goal: Acceptable Pain Control  Outcome: Unable to Meet     Problem: Uterine Tachysystole (Labor)  Goal: Normal Uterine Contraction Pattern  Outcome: Unable to Meet

## 2024-10-04 NOTE — ANESTHESIA PROCEDURE NOTES
Labor Epidural    Pre-sedation assessment completed: 10/4/2024 4:43 AM    Patient reassessed immediately prior to procedure    Patient location during procedure: OB  Start Time: 10/4/2024 4:43 AM  Stop Time: 10/4/2024 4:43 AM  Indication:at surgeon's request  Performed By  CRNA/CORIN: Mickey Corado CRNA  Preanesthetic Checklist  Completed: patient identified, IV checked, site marked, risks and benefits discussed, surgical consent, monitors and equipment checked, pre-op evaluation and timeout performed  Additional Notes  Risks of epidural analgesia discussed , including but not limited to:  Headache, itching, nausea and vomiting, infection, failure, decreased BP, decreased fetal heart rate, possible . Permanent chronic back pain, nerve damage, paralysis, etc    Skin localized with lidocaine skin wheal.  Test dose 4 ml of 1.5% lidocaine with 1:200,000 Epinephrine - Negative    Bolus dose of 10 ml Fentanyl/Ropivacaine solution given and epidural infusion started.  Prep:  Pt Position:sitting  Sterile Tech:cap, gloves, mask and sterile barrier  Prep:chlorhexidine gluconate and isopropyl alcohol  Monitoring:blood pressure monitoring and continuous pulse oximetry  Epidural Block Procedure:  Approach:midline  Guidance:landmark technique and palpation technique  Location:L3-L4  Needle Type:Tuohy  Needle Gauge:18 G  Loss of Resistance Medium: saline  Loss of Resistance: 5cm  Cath Depth at skin:11 cm  Paresthesia: none  Aspiration:negative  Test Dose:negative  Number of Attempts: 1  Post Assessment:  Dressing:occlusive dressing applied and secured with tape  Pt Tolerance:patient tolerated the procedure well with no apparent complications  Complications:no

## 2024-10-04 NOTE — OP NOTE
Rob Harris [facility]  : 1980  MRN: [id number]  CSN: [id number]    Operative Report    Pre-op Diagnosis:  IUP at 37w0d weeks  Malpresentation, breech  Gestational hypertension  Gestational diabetes  AMA     Post-op Diagnosis:  Same as above     Procedure: Procedure(s):   SECTION PRIMARY     Surgeon: Hanh Mccarty M.D.     Assist: Staff was responsible for performing the following activities: Retraction, Suction, Irrigation, and Placing Dressing and their skilled assistance was necessary for the success of this case.     OR Staff: Circulator: Shanon Max RN; Aura Hermosillo RN  Scrub Person: Marilynn Abernathy; Areli Gunn; Silva Cortez  Other: Candice Krishna RN     Anesthesia: Spinal     Estimated Blood Loss: 1700 mls     Drains: Nguyễn      ABx: cefazolin 2 gms     Specimens:  none     Findings: Viable male infant with left footling presentation and right hip sravani breech.  Arcuate shape uterus with ?cornual fibroid vs  prominent horn.  Adnexa grossly normal in appearance.     Complications: none       Procedure Details:   After the appropriate time out, the patient was prepped and draped in the usual sterile fashion.  She had been placed in the dorsal supine left lateral tilt position.  A nguyễn catheter had been placed in the bladder for drainage during the procedure. A pfannenstiel skin incision was made with a knife and carried down to the fascia.  The fascia was nicked with a scalpel and the incision was extended bilaterally with curved Parson scissors. The superior aspect of the fascia was grasped with Kocher clamps x 2 and bluntly as well as sharply dissected away from the underlying rectus muscles.  A similar process was repeated inferiorly. The rectus muscles were  and the peritoneal cavity was entered sharply without complications. The bladder flap was created with Metzenbaum scissors and pickups with teeth.  The  retractor was placed and after checking for no  entrapment, was retracted down.  A transverse uterine incision was made with the knife and extended bilaterally.  The infant was delivered from the breech presentation with flexion of the head upon delivery.  The mouth and nose were bulb suctioned.  The cord was doubly clamped and cut and the infant handed to the pediatric nurse in attendance.  Cord blood was obtained.  The placenta was manually extracted from the uterus.  The uterus was then elevated from the abdomen and wiped free of remaining membranes.  There was noted to be the prominent left cornu versus left cornual fibroid.  The uterine incision was closed with #1 chromic in a running locking fashion with a second imbricating stitch.  There was noted to be a small subserosal tear over the left cornu.  Multiple figure-of-eight sutures using 3-0 and 2-0 chromic and Vicryl 4 placed in a figure-of-eight fashion for adequate hemostasis.  The uterus had been returned to the abdomen.  The lateral gutters were wiped free of remaining clots.  The site of surgical incision was inspected and noted to be hemostatic.  The  retractor was removed.  Skyler have been placed over the subserosal area followed by Interceed.  Adequate hemostasis was noted.  Gelfoam was placed over the hysterotomy site as well to ensure additional hemostasis.  The subfascial tissue was inspected and noted to be hemostatic.  The fascia was closed with 0 PDS in a running non-locking fashion.  Subcutaneous tissue was inspected and noted to be hemostatic with minimal bovie electrocautery.  Nargis's fascia was closed with 3-0 chromic in a running non-locking fashion.  The skin was approximated with 4-0 STRATAFIX in a subcuticular fashion.  Skin glue was applied as well as Steri-Strips.  Dressings were placed.  All instrument and sponge counts were correct at the end of the procedure. The patient tolerated the procedure well.  There were no complications.  She was taken to postoperative  recovery room in stable condition.      Complications:   None      Disposition:   Mother to Mother Baby/Postpartum  in stable condition currently.   Baby to NBN  in stable condition currently.     Hanh Mccarty M.D.  10/4/2024  11:50 EDT

## 2024-10-05 LAB
BACTERIA SPEC AEROBE CULT: NO GROWTH
BASOPHILS # BLD AUTO: 0.03 10*3/MM3 (ref 0–0.2)
BASOPHILS NFR BLD AUTO: 0.3 % (ref 0–1.5)
DEPRECATED RDW RBC AUTO: 43 FL (ref 37–54)
EOSINOPHIL # BLD AUTO: 0.17 10*3/MM3 (ref 0–0.4)
EOSINOPHIL NFR BLD AUTO: 1.9 % (ref 0.3–6.2)
ERYTHROCYTE [DISTWIDTH] IN BLOOD BY AUTOMATED COUNT: 15.4 % (ref 12.3–15.4)
HCT VFR BLD AUTO: 24.1 % (ref 34–46.6)
HGB BLD-MCNC: 7.8 G/DL (ref 12–15.9)
IMM GRANULOCYTES # BLD AUTO: 0.05 10*3/MM3 (ref 0–0.05)
IMM GRANULOCYTES NFR BLD AUTO: 0.6 % (ref 0–0.5)
LYMPHOCYTES # BLD AUTO: 1.07 10*3/MM3 (ref 0.7–3.1)
LYMPHOCYTES NFR BLD AUTO: 11.9 % (ref 19.6–45.3)
MCH RBC QN AUTO: 25.2 PG (ref 26.6–33)
MCHC RBC AUTO-ENTMCNC: 32.4 G/DL (ref 31.5–35.7)
MCV RBC AUTO: 78 FL (ref 79–97)
MONOCYTES # BLD AUTO: 0.47 10*3/MM3 (ref 0.1–0.9)
MONOCYTES NFR BLD AUTO: 5.2 % (ref 5–12)
NEUTROPHILS NFR BLD AUTO: 7.21 10*3/MM3 (ref 1.7–7)
NEUTROPHILS NFR BLD AUTO: 80.1 % (ref 42.7–76)
NRBC BLD AUTO-RTO: 0.2 /100 WBC (ref 0–0.2)
PLATELET # BLD AUTO: 180 10*3/MM3 (ref 140–450)
PMV BLD AUTO: 9.9 FL (ref 6–12)
RBC # BLD AUTO: 3.09 10*6/MM3 (ref 3.77–5.28)
WBC NRBC COR # BLD AUTO: 9 10*3/MM3 (ref 3.4–10.8)

## 2024-10-05 PROCEDURE — 0503F POSTPARTUM CARE VISIT: CPT | Performed by: OBSTETRICS & GYNECOLOGY

## 2024-10-05 PROCEDURE — 85025 COMPLETE CBC W/AUTO DIFF WBC: CPT | Performed by: OBSTETRICS & GYNECOLOGY

## 2024-10-05 RX ORDER — ACETAMINOPHEN 325 MG/1
650 TABLET ORAL EVERY 6 HOURS
Status: DISCONTINUED | OUTPATIENT
Start: 2024-10-05 | End: 2024-10-07 | Stop reason: HOSPADM

## 2024-10-05 RX ORDER — IBUPROFEN 600 MG/1
600 TABLET, FILM COATED ORAL EVERY 6 HOURS SCHEDULED
Status: DISCONTINUED | OUTPATIENT
Start: 2024-10-05 | End: 2024-10-07 | Stop reason: HOSPADM

## 2024-10-05 RX ORDER — FERROUS SULFATE 324(65)MG
324 TABLET, DELAYED RELEASE (ENTERIC COATED) ORAL 2 TIMES DAILY WITH MEALS
Status: DISCONTINUED | OUTPATIENT
Start: 2024-10-05 | End: 2024-10-07 | Stop reason: HOSPADM

## 2024-10-05 RX ADMIN — OXYCODONE 10 MG: 5 TABLET ORAL at 05:08

## 2024-10-05 RX ADMIN — OXYCODONE 10 MG: 5 TABLET ORAL at 17:32

## 2024-10-05 RX ADMIN — ACETAMINOPHEN 650 MG: 325 TABLET, FILM COATED ORAL at 17:31

## 2024-10-05 RX ADMIN — OXYCODONE 10 MG: 5 TABLET ORAL at 21:21

## 2024-10-05 RX ADMIN — PRENATAL VITAMINS-IRON FUMARATE 27 MG IRON-FOLIC ACID 0.8 MG TABLET 1 TABLET: at 09:29

## 2024-10-05 RX ADMIN — IBUPROFEN 600 MG: 600 TABLET ORAL at 06:00

## 2024-10-05 RX ADMIN — IBUPROFEN 600 MG: 600 TABLET ORAL at 20:25

## 2024-10-05 RX ADMIN — FERROUS SULFATE TAB EC 324 MG (65 MG FE EQUIVALENT) 324 MG: 324 (65 FE) TABLET DELAYED RESPONSE at 17:39

## 2024-10-05 RX ADMIN — OXYCODONE 10 MG: 5 TABLET ORAL at 13:53

## 2024-10-05 RX ADMIN — OXYCODONE 10 MG: 5 TABLET ORAL at 09:30

## 2024-10-05 RX ADMIN — HYDROXYZINE HYDROCHLORIDE 50 MG: 25 TABLET, FILM COATED ORAL at 21:37

## 2024-10-05 RX ADMIN — SIMETHICONE 80 MG: 80 TABLET, CHEWABLE ORAL at 17:39

## 2024-10-05 RX ADMIN — CALCIUM CARBONATE (ANTACID) CHEW TAB 500 MG 1 TABLET: 500 CHEW TAB at 21:32

## 2024-10-05 RX ADMIN — ACETAMINOPHEN 650 MG: 325 TABLET, FILM COATED ORAL at 23:34

## 2024-10-05 RX ADMIN — IBUPROFEN 600 MG: 600 TABLET ORAL at 13:41

## 2024-10-05 RX ADMIN — ACETAMINOPHEN 1000 MG: 500 TABLET, FILM COATED ORAL at 03:05

## 2024-10-05 RX ADMIN — ACETAMINOPHEN 1000 MG: 500 TABLET, FILM COATED ORAL at 09:29

## 2024-10-05 RX ADMIN — FERROUS SULFATE TAB EC 324 MG (65 MG FE EQUIVALENT) 324 MG: 324 (65 FE) TABLET DELAYED RESPONSE at 13:41

## 2024-10-05 RX ADMIN — DOCUSATE SODIUM 100 MG: 100 CAPSULE, LIQUID FILLED ORAL at 17:39

## 2024-10-05 NOTE — PLAN OF CARE
Goal Outcome Evaluation:         VSS, Pt rates pain 5/10 and is being managed with tylenol, ibuprofen and roxicodone. Pt states she is feeling better with pain regimen. Pt is bonding well with infant and bottle feeding. Pt has been walking in halls.

## 2024-10-05 NOTE — PLAN OF CARE
Goal Outcome Evaluation:  Plan of Care Reviewed With: patient        Progress: improving  Outcome Evaluation: VSS, adequate pain relief, bottlefeeding infant ,lochia WNL, drsg clean dry and intact, no nausea, continue with current plan of care                               Problem: Breastfeeding  Goal: Effective Breastfeeding  Outcome: Unable to Meet

## 2024-10-05 NOTE — PROGRESS NOTES
Rob Silva  : 1980  MRN: [id number]  CSN: [id number]    Post-operative Day #1  History of Present Illness:  Her pain is not well controlled.  Vaginal bleeding is appropriate amount.  She is not passing gas and has not had a bowel movement.  Upon review of her respiratory system, she has no respiratory symptoms and and denies SOB, cough, wheezing, chest pain.  Patient has voided x 2 with catheter out.  She denies being dizzy or lightheaded.    Vitals:  Min/max vitals past 24 hours:   Temp  Min: 97.2 °F (36.2 °C)  Max: 98.2 °F (36.8 °C)  BP  Min: 94/73  Max: 168/79  Pulse  Min: 76  Max: 117  Resp  Min: 12  Max: 16    Fluid balance past 24 hours:  I/O last 3 completed shifts:  In: 1000 [I.V.:1000]  Out: 5311 [Urine:3450; Blood:1861]         Physical Exam:  General Appearance: well developed, well nourished, not in any acute distress   Respiratory: breathing is unlabored, clear to auscultation bilaterally   CV: regular rate and rhythm, S1, S2 normal, no murmur, click, rub or gallop   Abdomen: soft, non-tender, no palpable masses; fundus firm and non-tender  incision is covered by bandage   Pelvic: not performed   Extremities: moves extremities well; normal appearance with no cyanosis or edema and no calf tenderness     Results Review:  I reviewed the patient's new clinical results.    Lab Results (last 24 hours)       Procedure Component Value Units Date/Time    CBC & Differential [[id number]]  (Abnormal) Collected: 10/05/24 0635    Specimen: Blood Updated: 10/05/24 0726    Narrative:      The following orders were created for panel order CBC & Differential.  Procedure                               Abnormality         Status                     ---------                               -----------         ------                     CBC Auto Differential[[id number]]        Abnormal            Final result                 Please view results for these tests on the individual orders.    CBC Auto  Differential [[id number]]  (Abnormal) Collected: 10/05/24 0635    Specimen: Blood Updated: 10/05/24 0726     WBC 9.00 10*3/mm3      RBC 3.09 10*6/mm3      Hemoglobin 7.8 g/dL      Hematocrit 24.1 %      MCV 78.0 fL      MCH 25.2 pg      MCHC 32.4 g/dL      RDW 15.4 %      RDW-SD 43.0 fl      MPV 9.9 fL      Platelets 180 10*3/mm3      Neutrophil % 80.1 %      Lymphocyte % 11.9 %      Monocyte % 5.2 %      Eosinophil % 1.9 %      Basophil % 0.3 %      Immature Grans % 0.6 %      Neutrophils, Absolute 7.21 10*3/mm3      Lymphocytes, Absolute 1.07 10*3/mm3      Monocytes, Absolute 0.47 10*3/mm3      Eosinophils, Absolute 0.17 10*3/mm3      Basophils, Absolute 0.03 10*3/mm3      Immature Grans, Absolute 0.05 10*3/mm3      nRBC 0.2 /100 WBC     Urine Culture - Urine, Urine, Clean Catch [[id number]]  (Normal) Collected: 10/03/24 1637    Specimen: Urine, Clean Catch Updated: 10/04/24 1324     Urine Culture No growth    CBC (No Diff) [[id number]]  (Abnormal) Collected: 10/04/24 1210    Specimen: Blood Updated: 10/04/24 1232     WBC 9.56 10*3/mm3      RBC 3.53 10*6/mm3      Hemoglobin 8.9 g/dL      Hematocrit 27.4 %      MCV 77.6 fL      MCH 25.2 pg      MCHC 32.5 g/dL      RDW 15.4 %      RDW-SD 42.1 fl      MPV 9.9 fL      Platelets 242 10*3/mm3           Imaging Results (Last 24 Hours)       ** No results found for the last 24 hours. **          Prenatal Labs:  Lab Results   Component Value Date    RUBELLAABIGG 4.63 2024    RH Positive 10/03/2024    HEPBSAG Negative 2024     Assessment   POD #1 S/P Primary  (LTCS)  Anemia     Plan   Continue routine post-operative care  Ambulate  Advance diet  Start iron supplements  Adjust p.o. pain medication    Hanh Mccarty M.D.  10/5/2024  09:51 EDT

## 2024-10-06 LAB
ALBUMIN SERPL-MCNC: 2.7 G/DL (ref 3.5–5.2)
ALBUMIN/GLOB SERPL: 1 G/DL
ALP SERPL-CCNC: 128 U/L (ref 39–117)
ALT SERPL W P-5'-P-CCNC: 14 U/L (ref 1–33)
ANION GAP SERPL CALCULATED.3IONS-SCNC: 10.1 MMOL/L (ref 5–15)
AST SERPL-CCNC: 19 U/L (ref 1–32)
BASOPHILS # BLD AUTO: 0.03 10*3/MM3 (ref 0–0.2)
BASOPHILS NFR BLD AUTO: 0.3 % (ref 0–1.5)
BILIRUB SERPL-MCNC: 0.3 MG/DL (ref 0–1.2)
BUN SERPL-MCNC: 8 MG/DL (ref 6–20)
BUN/CREAT SERPL: 11.8 (ref 7–25)
CALCIUM SPEC-SCNC: 8.5 MG/DL (ref 8.6–10.5)
CHLORIDE SERPL-SCNC: 104 MMOL/L (ref 98–107)
CO2 SERPL-SCNC: 24.9 MMOL/L (ref 22–29)
CREAT SERPL-MCNC: 0.68 MG/DL (ref 0.57–1)
DEPRECATED RDW RBC AUTO: 42.2 FL (ref 37–54)
EGFRCR SERPLBLD CKD-EPI 2021: 110.3 ML/MIN/1.73
EOSINOPHIL # BLD AUTO: 0.33 10*3/MM3 (ref 0–0.4)
EOSINOPHIL NFR BLD AUTO: 2.9 % (ref 0.3–6.2)
ERYTHROCYTE [DISTWIDTH] IN BLOOD BY AUTOMATED COUNT: 15.5 % (ref 12.3–15.4)
GLOBULIN UR ELPH-MCNC: 2.7 GM/DL
GLUCOSE SERPL-MCNC: 92 MG/DL (ref 65–99)
HCT VFR BLD AUTO: 23.7 % (ref 34–46.6)
HGB BLD-MCNC: 7.7 G/DL (ref 12–15.9)
IMM GRANULOCYTES # BLD AUTO: 0.14 10*3/MM3 (ref 0–0.05)
IMM GRANULOCYTES NFR BLD AUTO: 1.2 % (ref 0–0.5)
LYMPHOCYTES # BLD AUTO: 1.38 10*3/MM3 (ref 0.7–3.1)
LYMPHOCYTES NFR BLD AUTO: 12 % (ref 19.6–45.3)
MCH RBC QN AUTO: 25 PG (ref 26.6–33)
MCHC RBC AUTO-ENTMCNC: 32.5 G/DL (ref 31.5–35.7)
MCV RBC AUTO: 76.9 FL (ref 79–97)
MONOCYTES # BLD AUTO: 0.44 10*3/MM3 (ref 0.1–0.9)
MONOCYTES NFR BLD AUTO: 3.8 % (ref 5–12)
NEUTROPHILS NFR BLD AUTO: 79.8 % (ref 42.7–76)
NEUTROPHILS NFR BLD AUTO: 9.2 10*3/MM3 (ref 1.7–7)
NRBC BLD AUTO-RTO: 0.2 /100 WBC (ref 0–0.2)
PLATELET # BLD AUTO: 206 10*3/MM3 (ref 140–450)
PMV BLD AUTO: 9.6 FL (ref 6–12)
POTASSIUM SERPL-SCNC: 3.8 MMOL/L (ref 3.5–5.2)
PROT SERPL-MCNC: 5.4 G/DL (ref 6–8.5)
RBC # BLD AUTO: 3.08 10*6/MM3 (ref 3.77–5.28)
SODIUM SERPL-SCNC: 139 MMOL/L (ref 136–145)
WBC NRBC COR # BLD AUTO: 11.52 10*3/MM3 (ref 3.4–10.8)

## 2024-10-06 PROCEDURE — 80053 COMPREHEN METABOLIC PANEL: CPT | Performed by: OBSTETRICS & GYNECOLOGY

## 2024-10-06 PROCEDURE — 0503F POSTPARTUM CARE VISIT: CPT | Performed by: OBSTETRICS & GYNECOLOGY

## 2024-10-06 PROCEDURE — 85025 COMPLETE CBC W/AUTO DIFF WBC: CPT | Performed by: OBSTETRICS & GYNECOLOGY

## 2024-10-06 RX ORDER — NIFEDIPINE 30 MG/1
30 TABLET, EXTENDED RELEASE ORAL
Status: DISCONTINUED | OUTPATIENT
Start: 2024-10-06 | End: 2024-10-07 | Stop reason: HOSPADM

## 2024-10-06 RX ADMIN — ACETAMINOPHEN 650 MG: 325 TABLET, FILM COATED ORAL at 17:49

## 2024-10-06 RX ADMIN — PRENATAL VITAMINS-IRON FUMARATE 27 MG IRON-FOLIC ACID 0.8 MG TABLET 1 TABLET: at 08:24

## 2024-10-06 RX ADMIN — NIFEDIPINE 30 MG: 30 TABLET, FILM COATED, EXTENDED RELEASE ORAL at 10:31

## 2024-10-06 RX ADMIN — ACETAMINOPHEN 650 MG: 325 TABLET, FILM COATED ORAL at 13:09

## 2024-10-06 RX ADMIN — HYDROCODONE BITARTRATE AND ACETAMINOPHEN 1 TABLET: 5; 325 TABLET ORAL at 23:26

## 2024-10-06 RX ADMIN — ACETAMINOPHEN 650 MG: 325 TABLET, FILM COATED ORAL at 05:52

## 2024-10-06 RX ADMIN — DOCUSATE SODIUM 100 MG: 100 CAPSULE, LIQUID FILLED ORAL at 08:24

## 2024-10-06 RX ADMIN — SIMETHICONE 80 MG: 80 TABLET, CHEWABLE ORAL at 08:24

## 2024-10-06 RX ADMIN — HYDROCODONE BITARTRATE AND ACETAMINOPHEN 1 TABLET: 5; 325 TABLET ORAL at 18:46

## 2024-10-06 RX ADMIN — SIMETHICONE 80 MG: 80 TABLET, CHEWABLE ORAL at 17:49

## 2024-10-06 RX ADMIN — FERROUS SULFATE TAB EC 324 MG (65 MG FE EQUIVALENT) 324 MG: 324 (65 FE) TABLET DELAYED RESPONSE at 08:24

## 2024-10-06 RX ADMIN — SIMETHICONE 80 MG: 80 TABLET, CHEWABLE ORAL at 15:33

## 2024-10-06 RX ADMIN — FERROUS SULFATE TAB EC 324 MG (65 MG FE EQUIVALENT) 324 MG: 324 (65 FE) TABLET DELAYED RESPONSE at 17:49

## 2024-10-06 RX ADMIN — OXYCODONE 10 MG: 5 TABLET ORAL at 05:52

## 2024-10-06 RX ADMIN — ACETAMINOPHEN 650 MG: 325 TABLET, FILM COATED ORAL at 23:26

## 2024-10-06 RX ADMIN — IBUPROFEN 600 MG: 600 TABLET ORAL at 08:24

## 2024-10-06 RX ADMIN — IBUPROFEN 600 MG: 600 TABLET ORAL at 14:37

## 2024-10-06 RX ADMIN — IBUPROFEN 600 MG: 600 TABLET ORAL at 02:20

## 2024-10-06 RX ADMIN — IBUPROFEN 600 MG: 600 TABLET ORAL at 20:07

## 2024-10-06 RX ADMIN — OXYCODONE 10 MG: 5 TABLET ORAL at 01:25

## 2024-10-06 RX ADMIN — OXYCODONE 10 MG: 5 TABLET ORAL at 14:37

## 2024-10-06 RX ADMIN — OXYCODONE 5 MG: 5 TABLET ORAL at 10:31

## 2024-10-06 NOTE — PROGRESS NOTES
Rob Silva  : 1980  MRN: [id number]  CSN: [id number]    Post-operative Day #2  History of Present Illness:  Her pain is well controlled.  Vaginal bleeding is appropriate amount.  She is passing gas and has not had a bowel movement.  Upon review of her respiratory system, she has no respiratory symptoms and and denies SOB, cough, wheezing, chest pain.  Patient is tearful today.  She is uncertain regarding discharge.  Her blood pressures have been intermittently elevated.  She denies any headaches or visual disturbances.    Vitals:  Min/max vitals past 24 hours:   Temp  Min: 97.6 °F (36.4 °C)  Max: 98.6 °F (37 °C)  BP  Min: 131/75  Max: 161/78  Pulse  Min: 79  Max: 85  Resp  Min: 16  Max: 16    Fluid balance past 24 hours:  I/O last 3 completed shifts:  In: -   Out: 1900 [Urine:1900]         Physical Exam:  General Appearance: well developed, well nourished, not in any acute distress   Respiratory: breathing is unlabored, clear to auscultation bilaterally   CV: regular rate and rhythm, S1, S2 normal, no murmur, click, rub or gallop   Abdomen: soft, non-tender, no palpable masses; fundus firm and non-tender  incision is clean, dry, intact, and without drainage   Pelvic: not performed   Extremities: moves extremities well; normal appearance with no cyanosis or edema and no calf tenderness     Results Review:  I reviewed the patient's new clinical results.    Lab Results (last 24 hours)       Procedure Component Value Units Date/Time    Urine Culture - Urine, Urine, Clean Catch [[id number]]  (Normal) Collected: 10/03/24 1637    Specimen: Urine, Clean Catch Updated: 10/05/24 1457     Urine Culture No growth          Imaging Results (Last 24 Hours)       ** No results found for the last 24 hours. **          Prenatal Labs:  Lab Results   Component Value Date    RUBELLAABIGG 4.63 2024    RH Positive 10/03/2024    HEPBSAG Negative 2024     Assessment    POD #2 S/P Primary   (LTCS)  Elevated blood pressures  Postpartum anemia     Plan   Continue routine post-operative care  Ambulate  Repeat labs as ordered  Procardia 30 mg XL    Hanh Mccarty M.D.  10/6/2024  08:50 EDT

## 2024-10-06 NOTE — PLAN OF CARE
Goal Outcome Evaluation:  Plan of Care Reviewed With: patient, spouse           Outcome Evaluation: VSS with slighlty elevated BP's. Pt started on Nifedipine XL. Pain controlled with around the clock dosing with Narcotics and nonnarcotic dosing.  Pt up walking around several times today and she took a shower.  Bonding well with baby.  Incision clean, dry and steri strips are intact.  Bean FRIEND.

## 2024-10-06 NOTE — PLAN OF CARE
Goal Outcome Evaluation:            VSS, bonding well with infant. BP monitored closely throughout shift. Lochia wnl. C/O increased pain throughout shift, pain controlled with rx meds. Ice pack given to help with pain. Encouraged pt to ambulate in [place] often.

## 2024-10-07 VITALS
WEIGHT: 185 LBS | OXYGEN SATURATION: 98 % | SYSTOLIC BLOOD PRESSURE: 150 MMHG | RESPIRATION RATE: 16 BRPM | HEIGHT: 63 IN | HEART RATE: 82 BPM | TEMPERATURE: 97.9 F | BODY MASS INDEX: 32.78 KG/M2 | DIASTOLIC BLOOD PRESSURE: 80 MMHG

## 2024-10-07 PROCEDURE — 0503F POSTPARTUM CARE VISIT: CPT | Performed by: PHYSICIAN ASSISTANT

## 2024-10-07 RX ORDER — ACETAMINOPHEN 325 MG/1
650 TABLET ORAL EVERY 6 HOURS
Qty: 60 TABLET | Refills: 0 | Status: SHIPPED | OUTPATIENT
Start: 2024-10-07

## 2024-10-07 RX ORDER — NIFEDIPINE 30 MG
30 TABLET, EXTENDED RELEASE ORAL
Qty: 30 TABLET | Refills: 0 | Status: SHIPPED | OUTPATIENT
Start: 2024-10-08

## 2024-10-07 RX ORDER — IBUPROFEN 600 MG/1
600 TABLET, FILM COATED ORAL EVERY 6 HOURS SCHEDULED
Qty: 30 TABLET | Refills: 0 | Status: SHIPPED | OUTPATIENT
Start: 2024-10-07

## 2024-10-07 RX ORDER — FERROUS SULFATE 324(65)MG
324 TABLET, DELAYED RELEASE (ENTERIC COATED) ORAL 2 TIMES DAILY WITH MEALS
Qty: 60 TABLET | Refills: 2 | Status: SHIPPED | OUTPATIENT
Start: 2024-10-07

## 2024-10-07 RX ORDER — PSEUDOEPHEDRINE HCL 30 MG
100 TABLET ORAL 2 TIMES DAILY PRN
Qty: 60 CAPSULE | Refills: 0 | Status: SHIPPED | OUTPATIENT
Start: 2024-10-07

## 2024-10-07 RX ORDER — SIMETHICONE 80 MG
80 TABLET,CHEWABLE ORAL 4 TIMES DAILY PRN
Qty: 30 TABLET | Refills: 0 | Status: SHIPPED | OUTPATIENT
Start: 2024-10-07

## 2024-10-07 RX ORDER — OXYCODONE HYDROCHLORIDE 5 MG/1
5 TABLET ORAL EVERY 6 HOURS PRN
Qty: 8 TABLET | Refills: 0 | Status: SHIPPED | OUTPATIENT
Start: 2024-10-07 | End: 2024-10-14

## 2024-10-07 RX ADMIN — FERROUS SULFATE TAB EC 324 MG (65 MG FE EQUIVALENT) 324 MG: 324 (65 FE) TABLET DELAYED RESPONSE at 08:00

## 2024-10-07 RX ADMIN — NIFEDIPINE 30 MG: 30 TABLET, FILM COATED, EXTENDED RELEASE ORAL at 08:00

## 2024-10-07 RX ADMIN — PRENATAL VITAMINS-IRON FUMARATE 27 MG IRON-FOLIC ACID 0.8 MG TABLET 1 TABLET: at 08:00

## 2024-10-07 RX ADMIN — DOCUSATE SODIUM 100 MG: 100 CAPSULE, LIQUID FILLED ORAL at 08:10

## 2024-10-07 RX ADMIN — IBUPROFEN 600 MG: 600 TABLET ORAL at 03:07

## 2024-10-07 RX ADMIN — ACETAMINOPHEN 650 MG: 325 TABLET, FILM COATED ORAL at 06:03

## 2024-10-07 RX ADMIN — HYDROCODONE BITARTRATE AND ACETAMINOPHEN 1 TABLET: 5; 325 TABLET ORAL at 06:02

## 2024-10-07 RX ADMIN — IBUPROFEN 600 MG: 600 TABLET ORAL at 08:07

## 2024-10-07 NOTE — PLAN OF CARE
Goal Outcome Evaluation:  Plan of Care Reviewed With: patient        Progress: improving  Outcome Evaluation: VSS, blood pressure stable,adequate pain relief,ambulating,incision clean dry and intact with steri strips open to air,+ bonding, anticipating discharge

## 2024-10-07 NOTE — DISCHARGE SUMMARY
Discharge Summary      Date of Admission: 10/3/2024   Date of Discharge:    Admitting Diagnosis: Pregnancy [Z34.90]   Discharge Diagnosis: Primary  section 37 weeks, breech, gestational hypertension, gestational diabetes, AMA   Procedures Performed: Procedure(s):   SECTION PRIMARY      Consults: Consults       No orders found from 2024 to 10/4/2024.           Brief History: Patient is a 44 y.o. female underwent primary  section at 37 weeks.  Patient with unremarkable postpartum course. On day 3 postpartum she feels ready for discharge home. Pain well controlled, ambulating well, passing gas, voiding without difficulty. Blood pressures controlled well with Procardia XL 30 mg      Hospital Course:         Pending Studies:      Condition at discharge:    Discharge Medications:    Discharge Medications        New Medications        Instructions Start Date   acetaminophen 325 MG tablet  Commonly known as: TYLENOL   650 mg, Oral, Every 6 Hours      docusate sodium 100 MG capsule   100 mg, Oral, 2 Times Daily PRN      ferrous sulfate 324 (65 Fe) MG tablet delayed-release EC tablet   324 mg, Oral, 2 Times Daily With Meals      ibuprofen 600 MG tablet  Commonly known as: ADVIL,MOTRIN   600 mg, Oral, Every 6 Hours Scheduled      NIFEdipine CC 30 MG 24 hr tablet  Commonly known as: ADALAT CC   30 mg, Oral, Every 24 Hours Scheduled   Start Date: 2024     simethicone 80 MG chewable tablet  Commonly known as: MYLICON   80 mg, Oral, 4 Times Daily PRN             Continue These Medications        Instructions Start Date   albuterol sulfate  (90 Base) MCG/ACT inhaler  Commonly known as: PROVENTIL HFA;VENTOLIN HFA;PROAIR HFA   2 puffs, Inhalation, Every 4 Hours PRN      albuterol (5 MG/ML) 0.5% nebulizer solution  Commonly known as: PROVENTIL   2.5 mg, Nebulization, Every 6 Hours PRN      ARIPiprazole 5 MG tablet  Commonly known as: ABILIFY   2 mg, Oral, Daily      azelastine 0.1  % nasal spray  Commonly known as: ASTELIN   1 spray, Nasal, 2 Times Daily PRN, Use in each nostril as directed      budesonide 90 MCG/ACT inhaler  Commonly known as: PULMICORT   1 puff, Inhalation, 2 Times Daily - RT      glucose monitor monitoring kit   1 each, Does not apply, Daily      LaMICtal  MG tablet dispersible disintegrating tablet  Generic drug: lamoTRIgine   200 mg, Translingual, Daily      Lancet Device misc   1 each, Does not apply, 4 Times Daily      montelukast 10 MG tablet  Commonly known as: SINGULAIR   10 mg, Oral, Nightly      prenatal vitamin 28-0.8 28-0.8 MG tablet tablet   1 tablet, Oral, Daily             Stop These Medications      clonazePAM 1 MG tablet  Commonly known as: KlonoPIN     diphenhydrAMINE 25 mg capsule  Commonly known as: BENADRYL     folic acid 1 MG tablet  Commonly known as: FOLVITE     glucose blood test strip     metFORMIN 1000 MG tablet  Commonly known as: GLUCOPHAGE     temazepam 15 MG capsule  Commonly known as: RESTORIL             Discharge Disposition: Home or Self Care   Follow-up: Future Appointments   Date Time Provider Department Center   12/5/2024  3:15 PM Carlton Tobias MD [facility]          Time: Discharge 15 min    Follow up:   Future Appointments   Date Time Provider Department Center   12/5/2024  3:15 PM Carlton Tobias MD [facility]       This note has been electronically signed.    Sara Ziegler PA-C   October 7, 2024

## 2024-10-07 NOTE — PLAN OF CARE
Problem: Adult Inpatient Plan of Care  Goal: Plan of Care Review  Outcome: Met  Flowsheets (Taken 10/7/2024 2282)  Outcome Evaluation: VSS, adequate pain relief with current pain medication regimen, pt ambulating independently without help, lochia WDL, incision site WDL, positive bonding observed with infant, discharge order placed by PA.  Goal: Patient-Specific Goal (Individualized)  Outcome: Met  Goal: Absence of Hospital-Acquired Illness or Injury  Outcome: Met  Goal: Optimal Comfort and Wellbeing  Outcome: Met  Goal: Readiness for Transition of Care  Outcome: Met     Problem: Adjustment to Role Transition (Postpartum  Delivery)  Goal: Successful Maternal Role Transition  Outcome: Met     Problem: Bleeding (Postpartum  Delivery)  Goal: Hemostasis  Outcome: Met     Problem: Infection (Postpartum  Delivery)  Goal: Absence of Infection Signs and Symptoms  Outcome: Met     Problem: Pain (Postpartum  Delivery)  Goal: Acceptable Pain Control  Outcome: Met     Problem: Postoperative Nausea and Vomiting (Postpartum  Delivery)  Goal: Nausea and Vomiting Relief  Outcome: Met     Problem: Postoperative Urinary Retention (Postpartum  Delivery)  Goal: Effective Urinary Elimination  Outcome: Met     Problem: Hypertensive Disorders in Pregnancy  Goal: Maternal-Fetal Stabilization  Outcome: Met   Goal Outcome Evaluation:              Outcome Evaluation: VSS, adequate pain relief with current pain medication regimen, pt ambulating independently without help, lochia WDL, incision site WDL, positive bonding observed with infant, discharge order placed by PA.

## 2024-10-08 NOTE — PAYOR COMM NOTE
"To:  [facility]  From: Matilde Max RN  Phone: [phone]  Fax: [phone]  NPI: [id number]  TIN: [id number]  Member ID: [id number]   MRN: [id number]    Amanda Gandhi (44 y.o. Female)       Date of Birth   1980    Social Security Number       Address   [address] [place] [id number]    Home Phone   [phone]    MRN   [id number]       Latter day   None    Marital Status                               Admission Date   10/3/24    Admission Type   Elective    Admitting Provider   Velia Echeverria CNM    Attending Provider       Department, Room/Bed   [facility] OB [facility]YN, W205/1       Discharge Date   10/7/2024    Discharge Disposition   Home or Self Care    Discharge Destination                                 Attending Provider: (none)   Allergies: Bactrim [Sulfamethoxazole-trimethoprim], Sulfa Antibiotics    Isolation: None   Infection: None   Code Status: Prior    Ht: 160 cm (63\")   Wt: 83.9 kg (185 lb)    Admission Cmt: None   Principal Problem: Pregnancy [Z34.90]                   Active Insurance as of 10/3/2024       Primary Coverage       Payor Plan Insurance Group Employer/Plan Group    [employer] [facility] [employer] [id number]       Payor Plan Address Payor Plan Phone Number Payor Plan Fax Number Effective Dates    [address] [phone]  1/1/2018 - None Entered    [place] [address]         Subscriber Name Subscriber Birth Date Member ID       AMANDA GANDHI 1980 [id number]                     Emergency Contacts        (Rel.) Home Phone Work Phone Mobile Phone    MIRANDA HERNÁNDEZ (Father) [phone] -- --    DEJA RABAGO (Spouse) [phone] -- [phone]                 Discharge Summary        Sara Ziegler PA-C at 10/07/24 0824       Attestation signed by Gideon David MD at 10/07/24 0849    I have reviewed this documentation and agree.                  Discharge Summary      Date of Admission: " 10/3/2024   Date of Discharge:    Admitting Diagnosis: Pregnancy [Z34.90]   Discharge Diagnosis: Primary  section 37 weeks, breech, gestational hypertension, gestational diabetes, AMA   Procedures Performed: Procedure(s):   SECTION PRIMARY      Consults: Consults       No orders found from 2024 to 10/4/2024.           Brief History: Patient is a 44 y.o. female underwent primary  section at 37 weeks.  Patient with unremarkable postpartum course. On day 3 postpartum she feels ready for discharge home. Pain well controlled, ambulating well, passing gas, voiding without difficulty. Blood pressures controlled well with Procardia XL 30 mg      Hospital Course:         Pending Studies:      Condition at discharge:    Discharge Medications:    Discharge Medications        New Medications        Instructions Start Date   acetaminophen 325 MG tablet  Commonly known as: TYLENOL   650 mg, Oral, Every 6 Hours      docusate sodium 100 MG capsule   100 mg, Oral, 2 Times Daily PRN      ferrous sulfate 324 (65 Fe) MG tablet delayed-release EC tablet   324 mg, Oral, 2 Times Daily With Meals      ibuprofen 600 MG tablet  Commonly known as: ADVIL,MOTRIN   600 mg, Oral, Every 6 Hours Scheduled      NIFEdipine CC 30 MG 24 hr tablet  Commonly known as: ADALAT CC   30 mg, Oral, Every 24 Hours Scheduled   Start Date: 2024     simethicone 80 MG chewable tablet  Commonly known as: MYLICON   80 mg, Oral, 4 Times Daily PRN             Continue These Medications        Instructions Start Date   albuterol sulfate  (90 Base) MCG/ACT inhaler  Commonly known as: PROVENTIL HFA;VENTOLIN HFA;PROAIR HFA   2 puffs, Inhalation, Every 4 Hours PRN      albuterol (5 MG/ML) 0.5% nebulizer solution  Commonly known as: PROVENTIL   2.5 mg, Nebulization, Every 6 Hours PRN      ARIPiprazole 5 MG tablet  Commonly known as: ABILIFY   2 mg, Oral, Daily      azelastine 0.1 % nasal spray  Commonly known as:  ASTELIN   1 spray, Nasal, 2 Times Daily PRN, Use in each nostril as directed      budesonide 90 MCG/ACT inhaler  Commonly known as: PULMICORT   1 puff, Inhalation, 2 Times Daily - RT      glucose monitor monitoring kit   1 each, Does not apply, Daily      LaMICtal  MG tablet dispersible disintegrating tablet  Generic drug: lamoTRIgine   200 mg, Translingual, Daily      Lancet Device misc   1 each, Does not apply, 4 Times Daily      montelukast 10 MG tablet  Commonly known as: SINGULAIR   10 mg, Oral, Nightly      prenatal vitamin 28-0.8 28-0.8 MG tablet tablet   1 tablet, Oral, Daily             Stop These Medications      clonazePAM 1 MG tablet  Commonly known as: KlonoPIN     diphenhydrAMINE 25 mg capsule  Commonly known as: BENADRYL     folic acid 1 MG tablet  Commonly known as: FOLVITE     glucose blood test strip     metFORMIN 1000 MG tablet  Commonly known as: GLUCOPHAGE     temazepam 15 MG capsule  Commonly known as: RESTORIL             Discharge Disposition: Home or Self Care   Follow-up: Future Appointments   Date Time Provider Department Center   12/5/2024  3:15 PM Carlton Tobias MD [facility]          Time: Discharge 15 min    Follow up:   Future Appointments   Date Time Provider Department Center   12/5/2024  3:15 PM Carlton Tobias MD [facility]       This note has been electronically signed.    Sara Ziegler PA-C   October 7, 2024    Electronically signed by Gideon David MD at 10/07/24 0849

## 2024-10-11 ENCOUNTER — TELEPHONE (OUTPATIENT)
Dept: OBSTETRICS AND GYNECOLOGY | Facility: CLINIC | Age: 44
End: 2024-10-11
Payer: COMMERCIAL

## 2024-10-11 NOTE — TELEPHONE ENCOUNTER
Advise not to stop it. Try changing time of day that she takes it (before bed) Keep F/U appt for Monday.

## 2024-10-11 NOTE — TELEPHONE ENCOUNTER
Caller: Marlin Silva    Relationship to patient: Self    Best call back number: [phone] (home)       Patient is needing: PT CALLING IN STATING THAT THE BLOOD PRESSURE MEDICATION NIFEDIPINE 30MG IS MAKING HER HAVE SIDE EFFECTS INCLUDING: SEVERE HEADACHES, DIZZINESS AND WEAKNESS AND OCCASIONALLY SHE SEES SPOTS IN HER EYES. BP IS STILL GOING UP AND DOWN(ABOUT THE SAME AS WHEN SHE WAS IN THE HOSPITAL) 158/76 IS LATEST BP READING FROM THIS MORNING, LOWEST READING /77 THAT WAS TAKEN 10/8/24. NO SWELLING PT WOULD LIKE TO KNOW IF SHE CAN STOP TAKING IT. PLEASE ADVISE.

## 2024-10-14 ENCOUNTER — OFFICE VISIT (OUTPATIENT)
Dept: OBSTETRICS AND GYNECOLOGY | Facility: CLINIC | Age: 44
End: 2024-10-14
Payer: COMMERCIAL

## 2024-10-14 VITALS
SYSTOLIC BLOOD PRESSURE: 162 MMHG | HEIGHT: 63 IN | BODY MASS INDEX: 29.77 KG/M2 | DIASTOLIC BLOOD PRESSURE: 94 MMHG | WEIGHT: 168 LBS

## 2024-10-14 DIAGNOSIS — Z09 POSTOP CHECK: Primary | ICD-10-CM

## 2024-10-14 PROCEDURE — 99024 POSTOP FOLLOW-UP VISIT: CPT | Performed by: MIDWIFE

## 2024-10-14 NOTE — PROGRESS NOTES
"    Postpartum Progress Note    Patient name: Marlin Silva  Date of Service: 10/14/2024    ID: 44 y.o.     Diagnosis:   Chief Complaint   Patient presents with    Post-op Follow-up     1 week postop c section BP check       1 week post op  section  She is taking Procardia 30 XL at HS. She is tolerating it better.   She has been taking BP at home 4 x daily, systolics have been elevated, diastolics normal.    Patient Active Problem List   Diagnosis    Non-recurrent unilateral inguinal hernia without obstruction or gangrene    Chest pain, atypical    Moderate persistent asthma without complication    Anxiety disorder    Fibromyalgia    AMA (advanced maternal age) multigravida 35+    Excessive fetal growth affecting management of mother in third trimester, antepartum    Gestational diabetes mellitus, class A2    Pregnancy    Maternal care for other malpresentation of fetus, fetus 1       Subjective:      No complaints, Lochia light .  Ambulating, B&B habits wnl, tolerating regular diet  Pain well controlled. She is taking Tylenol and Ibuprofen  The patient is not currently breastfeeding.       Objective:      Vital signs:  /94   Ht 160 cm (63\")   Wt 76.2 kg (168 lb)   LMP 2023   Breastfeeding No   BMI 29.76 kg/m²    General: Alert & oriented x4, in no apparent distress  Abdomen: soft, nontender  Uterus: firm, nontender  Incision: healing well  Extremities: nontender; no edema      Labs:  Lab Results   Component Value Date    WBC 11.52 (H) 10/06/2024    HGB 7.7 (L) 10/06/2024    HCT 23.7 (L) 10/06/2024    MCV 76.9 (L) 10/06/2024     10/06/2024             Assessment/Plan:        1. S/p  delivery: Doing well.  2. Infant feeding: Supportive care.  The patient is not currently breastfeeding.  3. Contraception: Discussed options for contraception, vasectomy  4. Elevated blood pressure: consulted with Dr Mccarty- Continue Procardia daily  5. RTO 2 weeks for BP check. Take BP " twice daily    No orders of the defined types were placed in this encounter.      Velia Echeverria CNM  10/14/2024

## 2024-10-16 ENCOUNTER — MATERNAL SCREENING (OUTPATIENT)
Dept: CALL CENTER | Facility: HOSPITAL | Age: 44
End: 2024-10-16
Payer: COMMERCIAL

## 2024-10-16 NOTE — OUTREACH NOTE
Maternal Screening Survey      Flowsheet Row Responses   Facility patient discharged from? [facility]   Attempt successful? No   Unsuccessful attempts Attempt 2              Marley GRIFFIN - Registered Nurse

## 2024-10-16 NOTE — OUTREACH NOTE
Maternal Screening Survey      Flowsheet Row Responses   Facility patient discharged from? [facility]   Attempt successful? No   Unsuccessful attempts Attempt 1              Marley GRIFFIN - Registered Nurse

## 2024-10-17 ENCOUNTER — MATERNAL SCREENING (OUTPATIENT)
Dept: CALL CENTER | Facility: HOSPITAL | Age: 44
End: 2024-10-17
Payer: COMMERCIAL

## 2024-10-17 NOTE — OUTREACH NOTE
Maternal Screening Survey      Flowsheet Row Responses   Facility patient discharged from? [facility]   Attempt successful? No   Unsuccessful attempts Attempt 3   Revoke Decline to participate              Kenya STEPHENS - Registered Nurse

## 2024-10-28 ENCOUNTER — OFFICE VISIT (OUTPATIENT)
Dept: OBSTETRICS AND GYNECOLOGY | Facility: CLINIC | Age: 44
End: 2024-10-28
Payer: COMMERCIAL

## 2024-10-28 VITALS
SYSTOLIC BLOOD PRESSURE: 144 MMHG | WEIGHT: 169.4 LBS | BODY MASS INDEX: 30.02 KG/M2 | HEIGHT: 63 IN | DIASTOLIC BLOOD PRESSURE: 84 MMHG

## 2024-10-28 DIAGNOSIS — Z09 POSTOP CHECK: Primary | ICD-10-CM

## 2024-10-28 PROCEDURE — 99024 POSTOP FOLLOW-UP VISIT: CPT | Performed by: MIDWIFE

## 2024-10-28 NOTE — PROGRESS NOTES
"    Postpartum Progress Note    Patient name: Marlin Silva  Date of Service: 10/28/2024    ID: 44 y.o.     Diagnosis:   Chief Complaint   Patient presents with    Post-op     3 Weeks post op C/Section here for blood pressure check      3 week post op  section here for repeat BP check  She is taking procardia XL q hs  BPs at home have seen some systolic elevations    Patient Active Problem List   Diagnosis    Non-recurrent unilateral inguinal hernia without obstruction or gangrene    Chest pain, atypical    Moderate persistent asthma without complication    Anxiety disorder    Fibromyalgia    AMA (advanced maternal age) multigravida 35+    Excessive fetal growth affecting management of mother in third trimester, antepartum    Gestational diabetes mellitus, class A2    Pregnancy    Maternal care for other malpresentation of fetus, fetus 1       Subjective:      No complaints, Lochia light .  Ambulating, B&B habits wnl, tolerating regular diet  Pain well controlled. She is not taking pain meds  The patient is not currently breastfeeding.       Objective:      Vital signs:  /84   Ht 160 cm (63\")   Wt 76.8 kg (169 lb 6.4 oz)   Breastfeeding No   BMI 30.01 kg/m²    General: Alert & oriented x4, in no apparent distress  Abdomen: soft, nontender  Uterus: firm, nontender  Incision: healing well, steri strips removed  Extremities: nontender; no edema      Labs:  Lab Results   Component Value Date    WBC 11.52 (H) 10/06/2024    HGB 7.7 (L) 10/06/2024    HCT 23.7 (L) 10/06/2024    MCV 76.9 (L) 10/06/2024     10/06/2024             Assessment/Plan:        1. S/p  delivery: Doing well.  2. Infant feeding: Supportive care.  The patient is not currently breastfeeding.  3. Contraception: Discussed options for contraception, vasectomy  4. RTO 3 weeks     No orders of the defined types were placed in this encounter.      Velia Echeverria CNM  10/28/2024    "

## 2024-11-19 ENCOUNTER — POSTPARTUM VISIT (OUTPATIENT)
Dept: OBSTETRICS AND GYNECOLOGY | Facility: CLINIC | Age: 44
End: 2024-11-19
Payer: COMMERCIAL

## 2024-11-19 VITALS
BODY MASS INDEX: 30.3 KG/M2 | HEIGHT: 63 IN | SYSTOLIC BLOOD PRESSURE: 124 MMHG | WEIGHT: 171 LBS | DIASTOLIC BLOOD PRESSURE: 70 MMHG

## 2024-11-19 PROBLEM — Z34.90 PREGNANCY: Status: RESOLVED | Noted: 2024-10-03 | Resolved: 2024-11-19

## 2024-11-19 PROBLEM — O24.419 GESTATIONAL DIABETES MELLITUS, CLASS A2: Status: RESOLVED | Noted: 2024-08-13 | Resolved: 2024-11-19

## 2024-11-19 PROBLEM — O32.8XX1 MATERNAL CARE FOR OTHER MALPRESENTATION OF FETUS, FETUS 1: Status: RESOLVED | Noted: 2024-09-30 | Resolved: 2024-11-19

## 2024-11-19 PROBLEM — O36.63X0 EXCESSIVE FETAL GROWTH AFFECTING MANAGEMENT OF MOTHER IN THIRD TRIMESTER, ANTEPARTUM: Status: RESOLVED | Noted: 2024-08-06 | Resolved: 2024-11-19

## 2024-11-19 PROBLEM — O09.529 AMA (ADVANCED MATERNAL AGE) MULTIGRAVIDA 35+: Status: RESOLVED | Noted: 2024-06-17 | Resolved: 2024-11-19

## 2024-11-19 NOTE — PROGRESS NOTES
"History  Chief Complaint   Patient presents with    Postpartum Care     6 week postpartum care         Marlin Silva is a 44 y.o. year old  presenting to be seen for her postpartum visit.  She had a Primary  (LTCS).    Since delivery she has not been sexually active.   She does not have concerns about post-partum blues/depression.   She is bottle feeding.  For ongoing contraception, her plans are condoms and vasectomy.  She has not had a menstrual cycle.         Physical  /70   Ht 160 cm (63\")   Wt 77.6 kg (171 lb)   Breastfeeding No   BMI 30.29 kg/m²     General:  well developed; well nourished  no acute distress  mentation appropriate   Abdomen: soft, non-tender; no masses  incision is healed   Pelvis: Clinical staff was present for exam  Uterus:  normal size, shape and consistency.  Adnexa:  normal bimanual exam of the adnexa.        Assessment   Normal 6 week postpartum exam  Contraceptive management     Plan   BC options reviewed and compared today: condoms and vasectomy  Pap smear normal 24  Follow up 1 year    No orders of the defined types were placed in this encounter.         This note was electronically signed.  Velia Echeverria, LUISA  2024  "

## 2024-12-05 ENCOUNTER — OFFICE VISIT (OUTPATIENT)
Dept: PULMONOLOGY | Facility: CLINIC | Age: 44
End: 2024-12-05
Payer: COMMERCIAL

## 2024-12-05 VITALS
DIASTOLIC BLOOD PRESSURE: 74 MMHG | OXYGEN SATURATION: 94 % | HEART RATE: 83 BPM | HEIGHT: 63 IN | SYSTOLIC BLOOD PRESSURE: 126 MMHG | RESPIRATION RATE: 18 BRPM | WEIGHT: 175 LBS | BODY MASS INDEX: 31.01 KG/M2

## 2024-12-05 DIAGNOSIS — J30.89 OTHER ALLERGIC RHINITIS: ICD-10-CM

## 2024-12-05 DIAGNOSIS — J45.40 MODERATE PERSISTENT ASTHMA WITHOUT COMPLICATION: Primary | ICD-10-CM

## 2024-12-05 PROCEDURE — 99214 OFFICE O/P EST MOD 30 MIN: CPT | Performed by: INTERNAL MEDICINE

## 2024-12-05 RX ORDER — ALBUTEROL SULFATE 90 UG/1
2 INHALANT RESPIRATORY (INHALATION) EVERY 4 HOURS PRN
Qty: 18 G | Refills: 1 | Status: SHIPPED | OUTPATIENT
Start: 2024-12-05

## 2024-12-05 NOTE — PROGRESS NOTES
"  Chief Complaint   Patient presents with    Follow-up    Breathing Problem         Subjective   Marlin Silva is a 44 y.o. female.   Patient was evaluated today for follow up of asthma, and allergic rhinitis.     Patient does not report any recent exacerbations requiring emergency room visits or hospitalizations.     Patient is compliant with pulmonary medicines, as prescribed.     she is currently on Pulmicort. she is using the rescue inhalers minimally.     Patient says that she has been using her nasal sprays on a regular basis and describes no significant ongoing issues other than occasional congestion.       The following portions of the patient's history were reviewed and updated as appropriate: allergies, current medications, past family history, past medical history, past social history, and past surgical history.    Review of Systems   HENT:  Negative for sinus pressure, sneezing and sore throat.    Respiratory:  Negative for cough, chest tightness, shortness of breath and wheezing.    Psychiatric/Behavioral:  Negative for sleep disturbance.        Objective   Visit Vitals  /74   Pulse 83   Resp 18   Ht 160 cm (62.99\") Comment: pt reported   Wt 79.4 kg (175 lb)   SpO2 94%   BMI 31.01 kg/m²       BMI Readings from Last 8 Encounters:   12/05/24 31.01 kg/m²   11/19/24 30.29 kg/m²   10/28/24 30.01 kg/m²   10/14/24 29.76 kg/m²   10/03/24 32.77 kg/m²   10/03/24 32.28 kg/m²   09/30/24 32.95 kg/m²   09/27/24 32.95 kg/m²       Physical Exam  Vitals reviewed.   Constitutional:       Appearance: She is well-developed.   Neck:      Vascular: No JVD.   Pulmonary:      Effort: Pulmonary effort is normal. No respiratory distress.      Breath sounds: Normal breath sounds. No wheezing or rales.   Musculoskeletal:      Cervical back: Neck supple.      Comments: Gait was normal.   Skin:     General: Skin is warm and dry.   Neurological:      Mental Status: She is alert and oriented to person, place, and time. "             Assessment & Plan   Diagnoses and all orders for this visit:    1. Moderate persistent asthma without complication (Primary)  -     Spirometry With Bronchodilator; Future  -     Nitric Oxide; Future    2. Other allergic rhinitis    Other orders  -     budesonide (PULMICORT) 90 MCG/ACT inhaler; Inhale 1 puff 2 (Two) Times a Day. Rinse mouth with water after use  Dispense: 1 each; Refill: 11  -     albuterol sulfate  (90 Base) MCG/ACT inhaler; Inhale 2 puffs Every 4 (Four) Hours As Needed for Wheezing.  Dispense: 18 g; Refill: 1           Return in about 6 months (around 6/20/2025) for Recheck, Spirometry F/U, FeNO F/U, For Eva Arias).    DISCUSSION (if any):  Since the patient is doing fairly well as far as respiratory symptoms are concerned, I have recommended that she discontinued using Singulair    If the patient is doing fairly well as far as respiratory symptoms are concerned, at the time of follow-up, we may recommend that she discontinue using Pulmicort    It appears that her symptoms of asthma are under adequate control with the current regimen.    Patient's medications for underlying asthma were reviewed in great detail.    Will ask her to use Pulmicort BID and not Daily please.     Compliance with medications stressed.     Side effects of prescribed medications discussed with the patient.    The need to continue to be aware of triggers that may cause asthma exacerbation versus progression of disease, was also discussed.    Patient was advised to continue her nasal spray, especially given improvement in symptoms overall.    Vaccination status addressed.    Declines influenza vaccinations.     Declines pneumonia vaccinations.     For the vaccines, that she refused today, I have asked her to discuss this further with her PCP.      Dictated utilizing Dragon dictation.    This document was electronically signed by Carlton Tobias MD on 12/05/24 at 15:25 EST

## 2024-12-20 ENCOUNTER — TELEPHONE (OUTPATIENT)
Dept: OBSTETRICS AND GYNECOLOGY | Facility: CLINIC | Age: 44
End: 2024-12-20
Payer: COMMERCIAL

## 2024-12-20 NOTE — TELEPHONE ENCOUNTER
Patient just called and advised she just finished a very heavy cycle and has decided she wants to go ahead and start oral contraceptives. Request to be sent to pharmacy. Thanks.

## 2024-12-23 DIAGNOSIS — J45.50 SEVERE PERSISTENT ASTHMA WITHOUT COMPLICATION: ICD-10-CM

## 2024-12-23 DIAGNOSIS — J30.89 OTHER ALLERGIC RHINITIS: ICD-10-CM

## 2024-12-23 RX ORDER — MONTELUKAST SODIUM 10 MG/1
10 TABLET ORAL
Qty: 30 TABLET | Refills: 5 | OUTPATIENT
Start: 2024-12-23

## 2025-02-12 RX ORDER — ALBUTEROL SULFATE 90 UG/1
2 INHALANT RESPIRATORY (INHALATION) EVERY 4 HOURS PRN
Qty: 6.7 G | Refills: 1 | Status: SHIPPED | OUTPATIENT
Start: 2025-02-12

## 2025-05-13 ENCOUNTER — OFFICE VISIT (OUTPATIENT)
Dept: OBSTETRICS AND GYNECOLOGY | Facility: CLINIC | Age: 45
End: 2025-05-13
Payer: COMMERCIAL

## 2025-05-13 VITALS
HEIGHT: 63 IN | WEIGHT: 170 LBS | BODY MASS INDEX: 30.12 KG/M2 | SYSTOLIC BLOOD PRESSURE: 124 MMHG | DIASTOLIC BLOOD PRESSURE: 82 MMHG

## 2025-05-13 DIAGNOSIS — N94.6 DYSMENORRHEA: ICD-10-CM

## 2025-05-13 DIAGNOSIS — N92.1 MENORRHAGIA WITH IRREGULAR CYCLE: Primary | ICD-10-CM

## 2025-05-13 DIAGNOSIS — N93.9 ABNORMAL UTERINE BLEEDING (AUB): ICD-10-CM

## 2025-05-13 PROCEDURE — 99213 OFFICE O/P EST LOW 20 MIN: CPT | Performed by: MIDWIFE

## 2025-05-13 RX ORDER — DESOGESTREL AND ETHINYL ESTRADIOL 0.15-0.03
1 KIT ORAL DAILY
Qty: 28 TABLET | Refills: 2 | Status: SHIPPED | OUTPATIENT
Start: 2025-05-13 | End: 2026-05-13

## 2025-05-13 NOTE — PROGRESS NOTES
"Chief Complaint   Patient presents with    vaginal pressure     Vaginal pressure - 2 menses per month       Marlin Silva is a 44 y.o. year old  presenting to be seen for irregular, heavy, and painful periods. She has also had pelvic pressure.   10/24 she had a Csection. She was started on OC .  supposed to have a vasectomy.  She states she has been having irregular periods since starting OCs and has had 2 periods this month. She bled the second week and is in the fourth week now. Her periods are usually 5 days long and very painful.  She states she was told she had a fibroid and is interested in having an endometrial ablation.    History  Past Medical History:   Diagnosis Date    Arthritis     Asthma     Asthma, extrinsic     Asthma, intrinsic     Depression     Dysphagia     REPORTS \"IT'S LIKE THINGS WON'T GO DOWN SOMETIMES\"    Fibromyalgia     GERD (gastroesophageal reflux disease)     PATIENT REPORTS HAD ER VISIT IN 2016 FOR RIGHT SIDED PAIN.  PATIENT THOUGHT MAY BE HEART RELATED BUT REPORTS WAS TOLD ONLY REFLUX.    History of bronchitis     History of kidney stones     REPORTS PASSED WITHOUT SURGICAL INTERVENTION IN SUMMER OF 2017    History of panic attacks     History of pneumonia     Insomnia     MRSA (methicillin resistant Staphylococcus aureus)     REPORTS IN SINUS CAVITY AND THAT SHE WAS TREATED    Neck pain     Seizures     TIA (transient ischemic attack)     REPORTS HX OF TIA 8 YEARS AGO AND THAT SHE WAS TAKEN OFF OF BCP.  NO RESIDUAL NOTED    Wears glasses     FOR READING   , Allergies:  Bactrim [sulfamethoxazole-trimethoprim] and Sulfa antibiotics    Social History    Tobacco Use      Smoking status: Never        Passive exposure: Never      Smokeless tobacco: Never      Review of Systems  Pertinent items are noted in HPI, all other systems reviewed and negative       Objective   /82   Ht 160 cm (63\")   Wt 77.1 kg (170 lb)   BMI 30.11 kg/m² "     Physical Exam:  General Appearance: alert, appears stated age, and cooperative  Lungs: respirations regular, respirations even, and respirations unlabored  Extremities: moves extremities well, no edema, no cyanosis, and no redness  Skin: no bleeding, bruising or rash and no lesions noted  Neurologic: Mental Status orientated to person, place, time and situation, Speech normal content and execusion    Lab Review   No data reviewed    Imaging   Pelvic ultrasound report  US Non-ob Transvaginal (02/08/2024 08:42)        Assessment /Plan    Diagnoses and all orders for this visit:    1. Menorrhagia with irregular cycle (Primary)  She has been on LoEstrin x several months. Will change OC to see if it improves bleeding.  -     desogestrel-ethinyl estradiol (APRI) 0.15-30 MG-MCG per tablet; Take 1 tablet by mouth Daily.  Dispense: 28 tablet; Refill: 2. She will start this when time to start new pack.  2. Abnormal uterine bleeding (AUB)  This could be related to OCs. Will change OCs with next pack. She will return to office for TVS and discuss further options with MD.   3. Dysmenorrhea          New Medications Ordered This Visit   Medications    desogestrel-ethinyl estradiol (APRI) 0.15-30 MG-MCG per tablet     Sig: Take 1 tablet by mouth Daily.     Dispense:  28 tablet     Refill:  2     Follow up 1-2 weeks  for TVS and discuss options with MD           This note was electronically signed.  Velia Echeverria CNM  5/13/2025

## 2025-06-05 ENCOUNTER — OFFICE VISIT (OUTPATIENT)
Dept: OBSTETRICS AND GYNECOLOGY | Facility: CLINIC | Age: 45
End: 2025-06-05
Payer: COMMERCIAL

## 2025-06-05 VITALS
BODY MASS INDEX: 30.83 KG/M2 | DIASTOLIC BLOOD PRESSURE: 72 MMHG | SYSTOLIC BLOOD PRESSURE: 128 MMHG | WEIGHT: 174 LBS | HEIGHT: 63 IN

## 2025-06-05 DIAGNOSIS — N95.1 MENOPAUSAL SYMPTOMS: ICD-10-CM

## 2025-06-05 DIAGNOSIS — D25.1 INTRAMURAL LEIOMYOMA OF UTERUS: ICD-10-CM

## 2025-06-05 DIAGNOSIS — N94.6 DYSMENORRHEA: ICD-10-CM

## 2025-06-05 DIAGNOSIS — N92.1 MENORRHAGIA WITH IRREGULAR CYCLE: Primary | ICD-10-CM

## 2025-06-05 PROCEDURE — 99214 OFFICE O/P EST MOD 30 MIN: CPT | Performed by: OBSTETRICS & GYNECOLOGY

## 2025-06-05 RX ORDER — DEXTROAMPHETAMINE SACCHARATE, AMPHETAMINE ASPARTATE MONOHYDRATE, DEXTROAMPHETAMINE SULFATE AND AMPHETAMINE SULFATE 5; 5; 5; 5 MG/1; MG/1; MG/1; MG/1
1 CAPSULE, EXTENDED RELEASE ORAL DAILY
COMMUNITY
Start: 2025-05-09

## 2025-06-05 RX ORDER — CLONAZEPAM 1 MG/1
TABLET ORAL
COMMUNITY
Start: 2025-05-27

## 2025-06-05 RX ORDER — LORATADINE/PSEUDOEPHEDRINE 10MG-240MG
1 TABLET, EXTENDED RELEASE 24 HR ORAL DAILY
COMMUNITY
Start: 2025-04-25

## 2025-06-05 NOTE — PROGRESS NOTES
"Chief Complaint  Follow-up (Transvaginal ultrasound-menorrhagia. )     History of Present Illness:  Patient is 44 y.o.  who presents to [facility] here for evaluation of her menorrhagia and dysmenorrhea.  Patient had her last menstrual cycle starting .  Patient is still bleeding today but less so.  She reports having 2 episodes of bleeding per month.  They are not occurring at the expected time.  She has continued on her current OCP.  She was prescribed a new pill last month.  She has not started the new medication at present.  She has been having hot flashes as well as night sweats.  She has also been having fatigue.  She reports her menstrual cycles are extremely heavy lasting for 6 to 7 days.  She did have a uterine abnormality noted at the time of her previous  section.  She is here for follow-up scan today.    History  Past Medical History:   Diagnosis Date    Arthritis     Asthma     Asthma, extrinsic     Asthma, intrinsic     Depression     Dysphagia     REPORTS \"IT'S LIKE THINGS WON'T GO DOWN SOMETIMES\"    Fibromyalgia     GERD (gastroesophageal reflux disease)     PATIENT REPORTS HAD ER VISIT IN 2016 FOR RIGHT SIDED PAIN.  PATIENT THOUGHT MAY BE HEART RELATED BUT REPORTS WAS TOLD ONLY REFLUX.    History of bronchitis     History of kidney stones     REPORTS PASSED WITHOUT SURGICAL INTERVENTION IN SUMMER OF 2017    History of panic attacks     History of pneumonia     Insomnia     MRSA (methicillin resistant Staphylococcus aureus)     REPORTS IN SINUS CAVITY AND THAT SHE WAS TREATED    Neck pain     Seizures     TIA (transient ischemic attack)     REPORTS HX OF TIA 8 YEARS AGO AND THAT SHE WAS TAKEN OFF OF BCP.  NO RESIDUAL NOTED    Wears glasses     FOR READING     Current Outpatient Medications on File Prior to Visit   Medication Sig Dispense Refill    amphetamine-dextroamphetamine XR (ADDERALL XR) 20 MG 24 hr capsule Take 1 capsule by mouth " Daily      clonazePAM (KlonoPIN) 1 MG tablet TAKE 1 TO 2 TABLETS BY MOUTH EVERY EVENING      Loratadine-D 24HR  MG per 24 hr tablet Take 1 tablet by mouth Daily.      albuterol (PROVENTIL) (5 MG/ML) 0.5% nebulizer solution Take 0.5 mL by nebulization Every 6 (Six) Hours As Needed for Wheezing.      albuterol sulfate  (90 Base) MCG/ACT inhaler Inhale 2 puffs Every 4 (Four) Hours As Needed for Wheezing. 6.7 g 1    ARIPiprazole (ABILIFY) 5 MG tablet Take 2 mg by mouth Daily.      azelastine (ASTELIN) 0.1 % nasal spray 1 spray into the nostril(s) as directed by provider 2 (Two) Times a Day As Needed for Rhinitis or Allergies. Use in each nostril as directed 1 each 5    budesonide (PULMICORT) 90 MCG/ACT inhaler Inhale 1 puff 2 (Two) Times a Day. Rinse mouth with water after use 1 each 11    desogestrel-ethinyl estradiol (APRI) 0.15-30 MG-MCG per tablet Take 1 tablet by mouth Daily. 28 tablet 2    ferrous sulfate 324 (65 Fe) MG tablet delayed-release EC tablet Take 1 tablet by mouth 2 (Two) Times a Day With Meals. 60 tablet 2    ibuprofen (ADVIL,MOTRIN) 600 MG tablet Take 1 tablet by mouth Every 6 (Six) Hours. 30 tablet 0    lamoTRIgine (LaMICtal ODT) 200 MG tablet dispersible disintegrating tablet Place 1 tablet on the tongue Daily.      Prenatal Vit-Fe Fumarate-FA (prenatal vitamin 28-0.8) 28-0.8 MG tablet tablet Take 1 tablet by mouth Daily.       No current facility-administered medications on file prior to visit.     Allergies   Allergen Reactions    Bactrim [Sulfamethoxazole-Trimethoprim] Dizziness     Severe shaking      Sulfa Antibiotics Other (See Comments)     REPORTS CAUSED HAND TREMORS AND HEART PALPITATIONS       Past Surgical History:   Procedure Laterality Date    BREAST AUGMENTATION       SECTION N/A 10/4/2024    Procedure:  SECTION PRIMARY;  Surgeon: Hanh Mccarty MD;  Location: [facility];  Service: Obstetrics/Gynecology;  Laterality: N/A;    COLONOSCOPY      DILATION AND  "CURETTAGE, DIAGNOSTIC / THERAPEUTIC      ENDOSCOPY      INGUINAL HERNIA REPAIR Right 11/20/2017    Procedure: REPAIR OF INCARCERATED RIGHT FEMORAL HERNIA, PRIMARY REPAIR OF BLADDER;  Surgeon: Jennifer Torrez MD;  Location: [facility];  Service:     SINUS SURGERY      WISDOM TOOTH EXTRACTION       Family History   Problem Relation Age of Onset    Hypertension Father     Cancer Maternal Aunt     Diabetes Paternal Uncle     Asthma Mother      Social History     Socioeconomic History    Marital status:    Tobacco Use    Smoking status: Never     Passive exposure: Never    Smokeless tobacco: Never   Vaping Use    Vaping status: Never Used   Substance and Sexual Activity    Alcohol use: Not Currently     Comment: SOCIAL USE ONLY, NO HX OF ABUSE REPORTED    Drug use: No    Sexual activity: Defer       Physical Examination:  Vital Signs: /72   Ht 160 cm (63\")   Wt 78.9 kg (174 lb)   BMI 30.82 kg/m²     General Appearance: alert, appears stated age, and cooperative  Breasts: Not performed.  Abdomen: no masses, no hepatomegaly, no splenomegaly, soft non-tender, no guarding, and no rebound tenderness  Pelvic: Not performed.    Data Review:  The following data was reviewed by: Hanh Mccarty MD on 06/05/2025:     Labs:  CBC & Differential (10/05/2024 06:35)  CBC & Differential (10/06/2024 08:59)  Comprehensive Metabolic Panel (10/06/2024 08:59)  Imaging:  US Non-ob Transvaginal (06/05/2025 10:30)   US Non-ob Transvaginal (02/08/2024 08:42)   Medical Records:  None    Assessment and Plan   1. Menorrhagia with irregular cycle  The patient was informed that menstrual flow outside of normal volume, duration, regularity, or frequency is considered abnormal uterine bleeding.  The patient was informed that the normal duration of menstrual flow is usually 5 days and the normal cycle typically lasts between 21-35 days.  The patient was informed that heavy menstrual bleeding has been defined as blood loss greater than 80 mL " and given that this is hard to quantify excessive blood loss is based on the patient's perception.  The patient was informed that AUB in women aged 40 years to menopause may be due to anovulatory bleeding in response to declining ovarian function.  The patient was informed that it may be due to endometrial hyperplasia, carcinoma, endometrial atrophy, and fibroids.  It is recommended that she have a pregnancy test, CBC, and TSH.  It is recommended that her pap smear be up to date and that she consider testing for Chlamydia if she feels she is at high risk.  It is recommended that she have a transvaginal ultrasound for further evaluation.  It is recommended that in women who are older than 45 years of age have endometrial sampling.  The various options for treatment of AUB were discussed pending the above evaluation.  Medical options for management of AUB include nonsteroidal antiinflammatory drugs, progestins, combination oral contraceptives, a levonorgestrel intrauterine device, or tranexamic acid.  If there are structural abnormalities noted on imaging then surgery may be indicated.  Endometrial ablation may be an option to control bleeding in women who have completed childbearing.  Transvaginal ultrasound obtained today.  Patient to return for labs at the end of her placebo week.  I have discussed with the patient the various options for management of her symptoms including both medical as well as surgical.  At minimum recommend patient follow-up after 3 cycles of her new OCP with repeat imaging immediately post a menstrual cycle.  - CBC & Differential  - Follicle Stimulating Hormone  - Estradiol  - Testosterone, Free, Total  - TSH  - T4, Free  - T3, Free  - US Non-ob Transvaginal; Future    2. Dysmenorrhea  Marlin Taomarlyn Silva was counseled regarding the various etiologies for dysmenorrhea.  The patient was informed that primary dysmenorrhea is painful menstruation in the absence of pathology.  The various  options for dysmenorrhea were discussed to include nonsteroidal antiinflammatory drugs, hormonal suppression, or both.  The patient was informed secondary dysmenorrhea is a result of pelvic pathology and is more common in patients with severe dysmenorrhea at menarche or progressively worsening dysmenorrhea, abnormal uterine bleeding, mid-cycle or acyclic pain, infertility, family history of endometriosis, dyspareunia, or lack of response to empiric therapy.  Evaluation for secondary causes includes pelvic ultrasonography and possible laparoscopy.  The various treatment options for secondary dysmenorrhea depends upon the etiology as discussed.   - US Non-ob Transvaginal; Future    3. Menopausal symptoms  The various options for the management of menopausal symptoms was discussed.  The medical treatment options discussed include HRT, SSRIs, SSNRIs, clonidine, and gabapentin.  The risks and benefits were discussed including the findings from the WHI study.  The increased risk of breast cancer, CAD, stroke, and VTE events were discussed for combination therapy vs the increased risk of CV events and breast cancer not being seen in the estrogen only group.  The lowest effective dose for the shortest duration of treatment was discussed in regards to HRT.  Other alternatives including otc supplements and lifestyle changes were also discussed.  Local estrogen therapy to relieve atrophic vaginal symptoms was discussed was well as other alternatives.   - Follicle Stimulating Hormone  - Estradiol  - Testosterone, Free, Total    4. Intramural leiomyoma of uterus  Transvaginal ultrasound obtained.  Patient informed regarding those findings.  At minimum recommend repeat imaging in 3 to 4 months.  - US Non-ob Transvaginal; Future    Follow Up/Instructions:  Follow up as noted.  Patient was given instructions and counseling regarding her condition or for health maintenance advice. Please see specific information pulled into the AVS  if appropriate.     Note: Speech recognition transcription software may have been used to dictate portions of this document.  An attempt at proofreading has been made though minor errors in transcription may still be present.    This note was electronically signed.  Hanh Mccarty M.D.

## 2025-06-15 LAB
ALBUMIN SERPL-MCNC: 4.6 G/DL (ref 3.5–5.2)
ALBUMIN/GLOB SERPL: 1.9 G/DL
ALP SERPL-CCNC: 67 U/L (ref 39–117)
ALT SERPL-CCNC: 19 U/L (ref 1–33)
AST SERPL-CCNC: 17 U/L (ref 1–32)
BASOPHILS # BLD AUTO: 0.07 10*3/MM3 (ref 0–0.2)
BASOPHILS NFR BLD AUTO: 0.9 % (ref 0–1.5)
BILIRUB SERPL-MCNC: 0.3 MG/DL (ref 0–1.2)
BUN SERPL-MCNC: 21 MG/DL (ref 6–20)
BUN/CREAT SERPL: 24.1 (ref 7–25)
CALCIUM SERPL-MCNC: 9.9 MG/DL (ref 8.6–10.5)
CHLORIDE SERPL-SCNC: 103 MMOL/L (ref 98–107)
CO2 SERPL-SCNC: 22.8 MMOL/L (ref 22–29)
CREAT SERPL-MCNC: 0.87 MG/DL (ref 0.57–1)
EGFRCR SERPLBLD CKD-EPI 2021: 84.4 ML/MIN/1.73
EOSINOPHIL # BLD AUTO: 0.25 10*3/MM3 (ref 0–0.4)
EOSINOPHIL NFR BLD AUTO: 3.1 % (ref 0.3–6.2)
ERYTHROCYTE [DISTWIDTH] IN BLOOD BY AUTOMATED COUNT: 18.2 % (ref 12.3–15.4)
ESTRADIOL SERPL-MCNC: 55.3 PG/ML
FSH SERPL-ACNC: 14.1 MIU/ML
GLOBULIN SER CALC-MCNC: 2.4 GM/DL
GLUCOSE SERPL-MCNC: 79 MG/DL (ref 65–99)
HCT VFR BLD AUTO: 45.2 % (ref 34–46.6)
HGB BLD-MCNC: 14.3 G/DL (ref 12–15.9)
IMM GRANULOCYTES # BLD AUTO: 0.02 10*3/MM3 (ref 0–0.05)
IMM GRANULOCYTES NFR BLD AUTO: 0.2 % (ref 0–0.5)
LYMPHOCYTES # BLD AUTO: 2.15 10*3/MM3 (ref 0.7–3.1)
LYMPHOCYTES NFR BLD AUTO: 26.3 % (ref 19.6–45.3)
MCH RBC QN AUTO: 27.8 PG (ref 26.6–33)
MCHC RBC AUTO-ENTMCNC: 31.6 G/DL (ref 31.5–35.7)
MCV RBC AUTO: 87.8 FL (ref 79–97)
MONOCYTES # BLD AUTO: 0.47 10*3/MM3 (ref 0.1–0.9)
MONOCYTES NFR BLD AUTO: 5.8 % (ref 5–12)
NEUTROPHILS # BLD AUTO: 5.2 10*3/MM3 (ref 1.7–7)
NEUTROPHILS NFR BLD AUTO: 63.7 % (ref 42.7–76)
NRBC BLD AUTO-RTO: 0 /100 WBC (ref 0–0.2)
PLATELET # BLD AUTO: 414 10*3/MM3 (ref 140–450)
POTASSIUM SERPL-SCNC: 4.3 MMOL/L (ref 3.5–5.2)
PROT SERPL-MCNC: 7 G/DL (ref 6–8.5)
RBC # BLD AUTO: 5.15 10*6/MM3 (ref 3.77–5.28)
SODIUM SERPL-SCNC: 138 MMOL/L (ref 136–145)
T3FREE SERPL-MCNC: 2.9 PG/ML (ref 2–4.4)
T4 FREE SERPL-MCNC: 1.24 NG/DL (ref 0.92–1.68)
TESTOST FREE SERPL-MCNC: 0.9 PG/ML (ref 0–4.2)
TESTOST SERPL-MCNC: 11 NG/DL (ref 4–50)
TSH SERPL DL<=0.005 MIU/L-ACNC: 1.9 UIU/ML (ref 0.27–4.2)
WBC # BLD AUTO: 8.16 10*3/MM3 (ref 3.4–10.8)

## 2025-07-07 RX ORDER — AZELASTINE HYDROCHLORIDE 137 UG/1
SPRAY, METERED NASAL
Qty: 30 ML | Refills: 5 | OUTPATIENT
Start: 2025-07-07

## 2025-07-08 RX ORDER — AZELASTINE 1 MG/ML
1 SPRAY, METERED NASAL 2 TIMES DAILY PRN
Qty: 1 EACH | Refills: 5 | Status: SHIPPED | OUTPATIENT
Start: 2025-07-08

## 2025-07-21 ENCOUNTER — OFFICE VISIT (OUTPATIENT)
Dept: PULMONOLOGY | Facility: CLINIC | Age: 45
End: 2025-07-21

## 2025-07-21 ENCOUNTER — OFFICE VISIT (OUTPATIENT)
Dept: PULMONOLOGY | Facility: CLINIC | Age: 45
End: 2025-07-21
Payer: COMMERCIAL

## 2025-07-21 VITALS
WEIGHT: 176 LBS | BODY MASS INDEX: 31.18 KG/M2 | HEART RATE: 84 BPM | HEIGHT: 63 IN | SYSTOLIC BLOOD PRESSURE: 122 MMHG | DIASTOLIC BLOOD PRESSURE: 74 MMHG | RESPIRATION RATE: 18 BRPM | OXYGEN SATURATION: 97 %

## 2025-07-21 DIAGNOSIS — J45.51 SEVERE PERSISTENT ASTHMA WITH ACUTE EXACERBATION: Primary | ICD-10-CM

## 2025-07-21 DIAGNOSIS — J30.89 OTHER ALLERGIC RHINITIS: ICD-10-CM

## 2025-07-21 DIAGNOSIS — J45.40 MODERATE PERSISTENT ASTHMA WITHOUT COMPLICATION: ICD-10-CM

## 2025-07-21 PROCEDURE — 95012 NITRIC OXIDE EXP GAS DETER: CPT | Performed by: NURSE PRACTITIONER

## 2025-07-21 PROCEDURE — 99214 OFFICE O/P EST MOD 30 MIN: CPT | Performed by: NURSE PRACTITIONER

## 2025-07-21 PROCEDURE — 94060 EVALUATION OF WHEEZING: CPT | Performed by: INTERNAL MEDICINE

## 2025-07-21 RX ORDER — MONTELUKAST SODIUM 10 MG/1
10 TABLET ORAL NIGHTLY
Qty: 30 TABLET | Refills: 5 | Status: SHIPPED | OUTPATIENT
Start: 2025-07-21

## 2025-07-21 RX ORDER — CEFDINIR 300 MG/1
1 CAPSULE ORAL EVERY 12 HOURS SCHEDULED
COMMUNITY
Start: 2025-07-10

## 2025-07-21 NOTE — PROGRESS NOTES
"Chief Complaint   Patient presents with    Breathing Problem    Follow-up         Subjective   Marlin Silva is a 44 y.o. female.   Patient is here today for follow up of asthma and shortness of breath.     Patient says that since the last office visit she has had no recent exacerbations. she denies any emergency room visits or hospitalizations, due to her asthma.     She is on omnicef due to abcessed tooth.    The patient says that she is compliant with pulmonary medicines, as prescribed. She has been using pulmicort twice a day and feels it helps.     She uses DAYAN inhaler once every 2 weeks. She feels she is using it more since she has not been taking singulair.     She has not needed nebulizer.     She has returned from the beach in the last few days and she states her symptoms are always a little worse with the weather transition.     She uses astelin reguarly.       The following portions of the patient's history were reviewed and updated as appropriate: allergies, current medications, past family history, past medical history, past social history, and past surgical history.    Review of Systems   HENT:  Positive for sinus pressure and sneezing. Negative for sore throat.    Respiratory:  Positive for cough, chest tightness, shortness of breath and wheezing.    Psychiatric/Behavioral:  Negative for sleep disturbance.        Objective   Visit Vitals  /74   Pulse 84   Resp 18   Ht 160 cm (62.99\")   Wt 79.8 kg (176 lb)   SpO2 97%   BMI 31.18 kg/m²         Physical Exam  Vitals reviewed.   HENT:      Head: Atraumatic.      Mouth/Throat:      Mouth: Mucous membranes are moist.   Eyes:      Extraocular Movements: Extraocular movements intact.   Cardiovascular:      Rate and Rhythm: Normal rate and regular rhythm.   Pulmonary:      Effort: Pulmonary effort is normal. No respiratory distress.      Breath sounds: Normal breath sounds. No wheezing.   Skin:     General: Skin is warm.   Neurological:      " Mental Status: She is alert and oriented to person, place, and time.           Assessment & Plan   Diagnoses and all orders for this visit:    1. Severe persistent asthma with acute exacerbation (Primary)    2. Other allergic rhinitis    Other orders  -     Budeson-Glycopyrrol-Formoterol (BREZTRI) 160-9-4.8 MCG/ACT aerosol inhaler; Inhale 2 puffs 2 (Two) Times a Day.  Dispense: 10.7 g; Refill: 5  -     montelukast (Singulair) 10 MG tablet; Take 1 tablet by mouth Every Night.  Dispense: 30 tablet; Refill: 5           Return in about 6 months (around 1/21/2026) for Recheck, For Dr. Tobias.    DISCUSSION (if any):  Spirometry today consistent with moderate obstruction. FeV1 has improved from previous PFT in 2023. FeV1 postBD 39% in 2023. FeV1 today postBD 64%.     FeNO today 69 ppb.     We many need to repeat IgE, RAST, CBC with diff in the future.     Her symptoms of asthma are under poor control at this time. I have prescribed Breztri since I have a coupon to help reduce copay and she needs increased therapy from pulmicort.     She would like to resume singulair. She did not have any SE to the medication or increased depression. I have ordered singulair today.     Patient's medications for underlying asthma were reviewed with her in great detail.    Any needed adjustments to her pulmonary medications, either for clinical or insurance coverage reasons, have been made and are reflected in the orders.    Side effects of prescribed medications discussed with the patient.    Asthma action plan with discussed with her.    The patient was asked to call this office if the symptoms worsen.         Dictated utilizing Dragon dictation.    This document was electronically signed by LUISA Roblero July 21, 2025  15:52 EDT

## 2025-07-22 DIAGNOSIS — J45.40 MODERATE PERSISTENT ASTHMA WITHOUT COMPLICATION: ICD-10-CM

## 2025-08-15 ENCOUNTER — TELEPHONE (OUTPATIENT)
Dept: PULMONOLOGY | Facility: CLINIC | Age: 45
End: 2025-08-15
Payer: COMMERCIAL

## 2025-08-25 RX ORDER — DESOGESTREL AND ETHINYL ESTRADIOL 0.15-0.03
1 KIT ORAL DAILY
Qty: 28 TABLET | Refills: 12 | Status: SHIPPED | OUTPATIENT
Start: 2025-08-25

## (undated) DEVICE — GLV SURG TRIUMPH MICRO PF LTX 7.5 STRL

## (undated) DEVICE — APPL CHLORAPREP W/TINT 26ML ORNG

## (undated) DEVICE — SUTURE GUT CHROMIC 3/0 912H

## (undated) DEVICE — SLV SCD CALF HEMOFORCE DVT THERP REPROC MD

## (undated) DEVICE — ANTIBACTERIAL UNDYED BRAIDED (POLYGLACTIN 910), SYNTHETIC ABSORBABLE SUTURE: Brand: COATED VICRYL

## (undated) DEVICE — STRIP,CLOSURE,WOUND,MEDI-STRIP,1/2X4: Brand: MEDLINE

## (undated) DEVICE — SUT GUT CHRM 1 CTX 36IN 905H

## (undated) DEVICE — DRSNG WND GZ PAD BORDERED LF 4X5IN STRL

## (undated) DEVICE — RICH C-SECTION: Brand: MEDLINE INDUSTRIES, INC.

## (undated) DEVICE — ANTIBACTERIAL VIOLET BRAIDED (POLYGLACTIN 910), SYNTHETIC ABSORBABLE SUTURE: Brand: COATED VICRYL

## (undated) DEVICE — IRRIGATOR TOOMEY 70CC

## (undated) DEVICE — SUT PDS 0 CT 36IN DYED Z358T

## (undated) DEVICE — SPNG GZ WOVN 4X4IN 12PLY 10/BX STRL

## (undated) DEVICE — SUT SILK 2/0 SH 30IN K833H

## (undated) DEVICE — SUT VICRYL 3/0 CT1 27IN J258H

## (undated) DEVICE — SUT GUT CHRM 2/0 SH 27IN G123H

## (undated) DEVICE — SYR LUERLOK 30CC

## (undated) DEVICE — NDL HYPO ECLPS SFTY 22G 1 1/2IN

## (undated) DEVICE — STERILE BABY BLANKET W/CSR: Brand: MEDLINE

## (undated) DEVICE — FLEXIBLE YANKAUER,MEDIUM TIP, NO VACUUM CONTROL: Brand: ARGYLE

## (undated) DEVICE — DRSNG WND BORDR/ADHS NONADHR/GZ LF 4X4IN STRL

## (undated) DEVICE — PENCL SMOKE/EVAC MEGADYNE TELESCP 10FT

## (undated) DEVICE — TOWEL,OR,DSP,ST,BLUE,STD,2/PK,40PK/CS: Brand: MEDLINE

## (undated) DEVICE — SUT VIC 0/0 UR6 27IN DYED J603H

## (undated) DEVICE — UNDYED BRAIDED (POLYGLACTIN 910), SYNTHETIC ABSORBABLE SUTURE: Brand: COATED VICRYL

## (undated) DEVICE — PENCL SMOKE/EVAC MEGADYNE TELESCP 15FT

## (undated) DEVICE — SUT VIC 3/0 SH 27IN J416H

## (undated) DEVICE — CUFF SCD HEMOFORCE SEQ CALF STD MD

## (undated) DEVICE — RICH MAJOR PROCEDURE: Brand: MEDLINE INDUSTRIES, INC.

## (undated) DEVICE — ADHS SKIN PREMIERPRO EXOFIN TOPICAL HI/VISC .5ML

## (undated) DEVICE — GLV SURG BIOGEL M LTX PF 6 1/2

## (undated) DEVICE — BLD CLIP UNIV SURG GRY

## (undated) DEVICE — DRN PENRS 1/4X18IN LTX

## (undated) DEVICE — LARGE, DISPOSABLE ALEXIS O C-SECTION PROTECTOR - RETRACTOR: Brand: ALEXIS ® O C-SECTION PROTECTOR - RETRACTOR

## (undated) DEVICE — GOWN SURG ORBIS LVL3 LG STRL

## (undated) DEVICE — SUT VIC 2/0 SH 27IN

## (undated) DEVICE — SUT PROLN 0 MO6 30IN 8418H

## (undated) DEVICE — SOL IRR NACL 0.9PCT BT 1000ML

## (undated) DEVICE — SPONGE,LAP,18"X18",DLX,XR,ST,5/PK,40/PK: Brand: MEDLINE